# Patient Record
Sex: MALE | Race: WHITE | Employment: FULL TIME | URBAN - METROPOLITAN AREA
[De-identification: names, ages, dates, MRNs, and addresses within clinical notes are randomized per-mention and may not be internally consistent; named-entity substitution may affect disease eponyms.]

---

## 2017-02-08 ENCOUNTER — OFFICE VISIT (OUTPATIENT)
Dept: FAMILY MEDICINE CLINIC | Age: 52
End: 2017-02-08

## 2017-02-08 VITALS
DIASTOLIC BLOOD PRESSURE: 84 MMHG | HEART RATE: 86 BPM | SYSTOLIC BLOOD PRESSURE: 128 MMHG | BODY MASS INDEX: 37.87 KG/M2 | RESPIRATION RATE: 18 BRPM | WEIGHT: 287 LBS | OXYGEN SATURATION: 97 % | TEMPERATURE: 98.4 F

## 2017-02-08 DIAGNOSIS — R11.2 NAUSEA AND VOMITING, INTRACTABILITY OF VOMITING NOT SPECIFIED, UNSPECIFIED VOMITING TYPE: ICD-10-CM

## 2017-02-08 DIAGNOSIS — B34.9 VIRAL ILLNESS: Primary | ICD-10-CM

## 2017-02-08 DIAGNOSIS — J06.9 UPPER RESPIRATORY TRACT INFECTION, UNSPECIFIED TYPE: ICD-10-CM

## 2017-02-08 DIAGNOSIS — R68.89 FLU-LIKE SYMPTOMS: ICD-10-CM

## 2017-02-08 DIAGNOSIS — R19.7 DIARRHEA, UNSPECIFIED TYPE: ICD-10-CM

## 2017-02-08 LAB
INFLUENZA A ANTIBODY: NEGATIVE
INFLUENZA B ANTIBODY: NEGATIVE

## 2017-02-08 PROCEDURE — 87804 INFLUENZA ASSAY W/OPTIC: CPT | Performed by: NURSE PRACTITIONER

## 2017-02-08 PROCEDURE — 99213 OFFICE O/P EST LOW 20 MIN: CPT | Performed by: NURSE PRACTITIONER

## 2017-02-08 ASSESSMENT — ENCOUNTER SYMPTOMS
SWOLLEN GLANDS: 0
VISUAL CHANGE: 0
SHORTNESS OF BREATH: 0
ABDOMINAL PAIN: 0
COUGH: 1
VOMITING: 1
BLOATING: 0
WHEEZING: 0
HEMOPTYSIS: 0
NAUSEA: 1
FLATUS: 0
SPUTUM PRODUCTION: 0
SORE THROAT: 0
BACK PAIN: 1
DIARRHEA: 1

## 2017-05-17 ENCOUNTER — TELEPHONE (OUTPATIENT)
Dept: FAMILY MEDICINE CLINIC | Age: 52
End: 2017-05-17

## 2017-05-17 ENCOUNTER — OFFICE VISIT (OUTPATIENT)
Dept: FAMILY MEDICINE CLINIC | Age: 52
End: 2017-05-17

## 2017-05-17 VITALS
BODY MASS INDEX: 36.55 KG/M2 | SYSTOLIC BLOOD PRESSURE: 128 MMHG | HEART RATE: 78 BPM | TEMPERATURE: 97.9 F | OXYGEN SATURATION: 97 % | DIASTOLIC BLOOD PRESSURE: 84 MMHG | WEIGHT: 277 LBS

## 2017-05-17 DIAGNOSIS — H10.31 ACUTE CONJUNCTIVITIS OF RIGHT EYE, UNSPECIFIED ACUTE CONJUNCTIVITIS TYPE: Primary | ICD-10-CM

## 2017-05-17 DIAGNOSIS — J30.2 SEASONAL ALLERGIC RHINITIS, UNSPECIFIED ALLERGIC RHINITIS TRIGGER: ICD-10-CM

## 2017-05-17 PROCEDURE — 99212 OFFICE O/P EST SF 10 MIN: CPT | Performed by: NURSE PRACTITIONER

## 2017-05-17 RX ORDER — FLUTICASONE PROPIONATE 50 MCG
2 SPRAY, SUSPENSION (ML) NASAL DAILY
Qty: 1 BOTTLE | Refills: 3 | Status: SHIPPED | OUTPATIENT
Start: 2017-05-17 | End: 2017-09-07 | Stop reason: SDUPTHER

## 2017-05-17 RX ORDER — CIPROFLOXACIN HYDROCHLORIDE 3.5 MG/ML
SOLUTION/ DROPS TOPICAL
Qty: 120 DROP | Refills: 0 | Status: SHIPPED | OUTPATIENT
Start: 2017-05-17 | End: 2017-05-17 | Stop reason: CLARIF

## 2017-05-17 RX ORDER — POLYMYXIN B SULFATE AND TRIMETHOPRIM 1; 10000 MG/ML; [USP'U]/ML
1 SOLUTION OPHTHALMIC EVERY 6 HOURS
Qty: 10 ML | Refills: 0
Start: 2017-05-17 | End: 2017-05-27

## 2017-05-17 RX ORDER — FLUTICASONE PROPIONATE 50 MCG
2 SPRAY, SUSPENSION (ML) NASAL DAILY
Qty: 1 BOTTLE | Refills: 3 | Status: SHIPPED | OUTPATIENT
Start: 2017-05-17 | End: 2017-05-17 | Stop reason: SDUPTHER

## 2017-05-17 ASSESSMENT — ENCOUNTER SYMPTOMS
STRIDOR: 0
SORE THROAT: 0
RESPIRATORY NEGATIVE: 1

## 2018-04-26 ENCOUNTER — OFFICE VISIT (OUTPATIENT)
Dept: FAMILY MEDICINE CLINIC | Age: 53
End: 2018-04-26

## 2018-04-26 VITALS
TEMPERATURE: 98.5 F | DIASTOLIC BLOOD PRESSURE: 76 MMHG | WEIGHT: 279 LBS | SYSTOLIC BLOOD PRESSURE: 122 MMHG | BODY MASS INDEX: 36.98 KG/M2 | RESPIRATION RATE: 16 BRPM | HEART RATE: 86 BPM | OXYGEN SATURATION: 97 % | HEIGHT: 73 IN

## 2018-04-26 DIAGNOSIS — R52 BODY ACHES: ICD-10-CM

## 2018-04-26 DIAGNOSIS — R68.83 CHILLS: ICD-10-CM

## 2018-04-26 DIAGNOSIS — R68.89 FLU-LIKE SYMPTOMS: ICD-10-CM

## 2018-04-26 LAB
INFLUENZA A ANTIBODY: NORMAL
INFLUENZA B ANTIBODY: NORMAL

## 2018-04-26 PROCEDURE — 87804 INFLUENZA ASSAY W/OPTIC: CPT | Performed by: NURSE PRACTITIONER

## 2018-04-26 PROCEDURE — 99212 OFFICE O/P EST SF 10 MIN: CPT | Performed by: NURSE PRACTITIONER

## 2018-04-26 RX ORDER — IBUPROFEN 200 MG
200 TABLET ORAL EVERY 6 HOURS PRN
COMMUNITY
End: 2021-11-23

## 2018-04-26 ASSESSMENT — ENCOUNTER SYMPTOMS
VOMITING: 0
SHORTNESS OF BREATH: 0
SPUTUM PRODUCTION: 0
SORE THROAT: 0
ABDOMINAL PAIN: 0
NAUSEA: 0
COUGH: 1
DIARRHEA: 0

## 2018-04-26 ASSESSMENT — PATIENT HEALTH QUESTIONNAIRE - PHQ9
1. LITTLE INTEREST OR PLEASURE IN DOING THINGS: 0
2. FEELING DOWN, DEPRESSED OR HOPELESS: 0
SUM OF ALL RESPONSES TO PHQ9 QUESTIONS 1 & 2: 0
SUM OF ALL RESPONSES TO PHQ QUESTIONS 1-9: 0

## 2018-05-03 DIAGNOSIS — H10.31 ACUTE CONJUNCTIVITIS OF RIGHT EYE, UNSPECIFIED ACUTE CONJUNCTIVITIS TYPE: ICD-10-CM

## 2018-05-03 RX ORDER — FLUTICASONE PROPIONATE 50 MCG
SPRAY, SUSPENSION (ML) NASAL
Qty: 16 G | Refills: 0 | Status: SHIPPED | OUTPATIENT
Start: 2018-05-03 | End: 2018-05-18 | Stop reason: SDUPTHER

## 2018-05-18 ENCOUNTER — OFFICE VISIT (OUTPATIENT)
Dept: FAMILY MEDICINE CLINIC | Age: 53
End: 2018-05-18

## 2018-05-18 VITALS
HEART RATE: 86 BPM | BODY MASS INDEX: 36.71 KG/M2 | DIASTOLIC BLOOD PRESSURE: 82 MMHG | SYSTOLIC BLOOD PRESSURE: 138 MMHG | WEIGHT: 277 LBS | OXYGEN SATURATION: 97 % | HEIGHT: 73 IN

## 2018-05-18 DIAGNOSIS — H10.31 ACUTE CONJUNCTIVITIS OF RIGHT EYE, UNSPECIFIED ACUTE CONJUNCTIVITIS TYPE: ICD-10-CM

## 2018-05-18 DIAGNOSIS — Z12.5 SPECIAL SCREENING FOR MALIGNANT NEOPLASM OF PROSTATE: ICD-10-CM

## 2018-05-18 DIAGNOSIS — Z12.11 COLON CANCER SCREENING: ICD-10-CM

## 2018-05-18 DIAGNOSIS — Z00.00 ANNUAL PHYSICAL EXAM: Primary | ICD-10-CM

## 2018-05-18 DIAGNOSIS — J30.2 CHRONIC SEASONAL ALLERGIC RHINITIS, UNSPECIFIED TRIGGER: ICD-10-CM

## 2018-05-18 LAB
BILIRUBIN, POC: NORMAL
BLOOD URINE, POC: NORMAL
CLARITY, POC: CLEAR
COLOR, POC: YELLOW
GLUCOSE URINE, POC: NORMAL
KETONES, POC: NORMAL
LEUKOCYTE EST, POC: NORMAL
NITRITE, POC: NORMAL
PH, POC: 5.5
PROTEIN, POC: NORMAL
SPECIFIC GRAVITY, POC: >=1.03
UROBILINOGEN, POC: 0.2

## 2018-05-18 PROCEDURE — 81002 URINALYSIS NONAUTO W/O SCOPE: CPT | Performed by: FAMILY MEDICINE

## 2018-05-18 PROCEDURE — 99396 PREV VISIT EST AGE 40-64: CPT | Performed by: FAMILY MEDICINE

## 2018-05-18 RX ORDER — FLUTICASONE PROPIONATE 50 MCG
SPRAY, SUSPENSION (ML) NASAL
Qty: 1 BOTTLE | Refills: 3 | Status: SHIPPED | OUTPATIENT
Start: 2018-05-18 | End: 2021-11-23

## 2018-05-18 ASSESSMENT — ENCOUNTER SYMPTOMS
RESPIRATORY NEGATIVE: 1
GASTROINTESTINAL NEGATIVE: 1

## 2018-05-22 DIAGNOSIS — Z12.11 SCREEN FOR COLON CANCER: Primary | ICD-10-CM

## 2018-05-22 RX ORDER — SODIUM, POTASSIUM,MAG SULFATES 17.5-3.13G
SOLUTION, RECONSTITUTED, ORAL ORAL
Qty: 354 ML | Refills: 0 | Status: SHIPPED | OUTPATIENT
Start: 2018-05-22 | End: 2018-06-08 | Stop reason: ALTCHOICE

## 2018-05-26 ENCOUNTER — HOSPITAL ENCOUNTER (OUTPATIENT)
Dept: OTHER | Age: 53
Discharge: HOME | End: 2018-05-26
Attending: FAMILY MEDICINE | Admitting: FAMILY MEDICINE

## 2018-05-26 DIAGNOSIS — Z12.5 SPECIAL SCREENING FOR MALIGNANT NEOPLASM OF PROSTATE: ICD-10-CM

## 2018-05-26 DIAGNOSIS — Z00.00 ANNUAL PHYSICAL EXAM: ICD-10-CM

## 2018-05-26 LAB
A/G RATIO: 1.6 (ref 1.1–2.2)
ALBUMIN SERPL-MCNC: 4.4 G/DL (ref 3.4–5)
ALP BLD-CCNC: 58 U/L (ref 40–129)
ALT SERPL-CCNC: 32 U/L (ref 10–40)
ANION GAP SERPL CALCULATED.3IONS-SCNC: 13 MMOL/L (ref 3–16)
AST SERPL-CCNC: 27 U/L (ref 15–37)
BASOPHILS ABSOLUTE: 0.1 K/UL (ref 0–0.2)
BASOPHILS RELATIVE PERCENT: 1.2 %
BILIRUB SERPL-MCNC: 0.4 MG/DL (ref 0–1)
BUN BLDV-MCNC: 20 MG/DL (ref 7–20)
CALCIUM SERPL-MCNC: 9.4 MG/DL (ref 8.3–10.6)
CHLORIDE BLD-SCNC: 107 MMOL/L (ref 99–110)
CHOLESTEROL, TOTAL: 180 MG/DL (ref 0–199)
CO2: 23 MMOL/L (ref 21–32)
CREAT SERPL-MCNC: 0.7 MG/DL (ref 0.9–1.3)
EOSINOPHILS ABSOLUTE: 0.1 K/UL (ref 0–0.6)
EOSINOPHILS RELATIVE PERCENT: 2.2 %
GFR AFRICAN AMERICAN: >60
GFR NON-AFRICAN AMERICAN: >60
GLOBULIN: 2.7 G/DL
GLUCOSE BLD-MCNC: 104 MG/DL (ref 70–99)
HCT VFR BLD CALC: 39.2 % (ref 40.5–52.5)
HDLC SERPL-MCNC: 49 MG/DL (ref 40–60)
HEMOGLOBIN: 13.6 G/DL (ref 13.5–17.5)
LDL CHOLESTEROL CALCULATED: 112 MG/DL
LYMPHOCYTES ABSOLUTE: 1.8 K/UL (ref 1–5.1)
LYMPHOCYTES RELATIVE PERCENT: 36.7 %
MCH RBC QN AUTO: 31.3 PG (ref 26–34)
MCHC RBC AUTO-ENTMCNC: 34.6 G/DL (ref 31–36)
MCV RBC AUTO: 90.5 FL (ref 80–100)
MONOCYTES ABSOLUTE: 0.4 K/UL (ref 0–1.3)
MONOCYTES RELATIVE PERCENT: 8.5 %
NEUTROPHILS ABSOLUTE: 2.5 K/UL (ref 1.7–7.7)
NEUTROPHILS RELATIVE PERCENT: 51.4 %
PDW BLD-RTO: 13.4 % (ref 12.4–15.4)
PLATELET # BLD: 260 K/UL (ref 135–450)
PMV BLD AUTO: 8.2 FL (ref 5–10.5)
POTASSIUM SERPL-SCNC: 4.1 MMOL/L (ref 3.5–5.1)
PROSTATE SPECIFIC ANTIGEN: 1.52 NG/ML (ref 0–4)
RBC # BLD: 4.34 M/UL (ref 4.2–5.9)
SODIUM BLD-SCNC: 143 MMOL/L (ref 136–145)
TOTAL PROTEIN: 7.1 G/DL (ref 6.4–8.2)
TRIGL SERPL-MCNC: 97 MG/DL (ref 0–150)
VLDLC SERPL CALC-MCNC: 19 MG/DL
WBC # BLD: 4.9 K/UL (ref 4–11)

## 2018-06-07 ENCOUNTER — TELEPHONE (OUTPATIENT)
Dept: GASTROENTEROLOGY | Age: 53
End: 2018-06-07

## 2018-06-08 ENCOUNTER — HOSPITAL ENCOUNTER (OUTPATIENT)
Dept: OTHER | Age: 53
Discharge: HOME | End: 2018-06-08
Attending: INTERNAL MEDICINE | Admitting: INTERNAL MEDICINE

## 2018-06-08 VITALS
SYSTOLIC BLOOD PRESSURE: 120 MMHG | DIASTOLIC BLOOD PRESSURE: 70 MMHG | BODY MASS INDEX: 36.45 KG/M2 | OXYGEN SATURATION: 99 % | TEMPERATURE: 97 F | WEIGHT: 275 LBS | HEART RATE: 62 BPM | RESPIRATION RATE: 16 BRPM | HEIGHT: 73 IN

## 2018-06-08 DIAGNOSIS — K57.30 DIVERTICULOSIS OF LARGE INTESTINE WITHOUT HEMORRHAGE: ICD-10-CM

## 2018-06-08 DIAGNOSIS — Z12.11 SCREEN FOR COLON CANCER: ICD-10-CM

## 2018-06-08 PROCEDURE — 45378 DIAGNOSTIC COLONOSCOPY: CPT | Performed by: INTERNAL MEDICINE

## 2018-06-08 PROCEDURE — 99152 MOD SED SAME PHYS/QHP 5/>YRS: CPT | Performed by: INTERNAL MEDICINE

## 2018-06-08 RX ORDER — SODIUM CHLORIDE 9 MG/ML
1000 INJECTION, SOLUTION INTRAVENOUS CONTINUOUS
Status: DISCONTINUED | OUTPATIENT
Start: 2018-06-08 | End: 2018-06-09 | Stop reason: HOSPADM

## 2018-06-08 RX ADMIN — SODIUM CHLORIDE 1000 ML: 9 INJECTION, SOLUTION INTRAVENOUS at 08:40

## 2018-06-08 ASSESSMENT — PAIN - FUNCTIONAL ASSESSMENT: PAIN_FUNCTIONAL_ASSESSMENT: 0-10

## 2018-06-08 ASSESSMENT — PAIN SCALES - GENERAL
PAINLEVEL_OUTOF10: 0

## 2018-06-11 ENCOUNTER — TELEPHONE (OUTPATIENT)
Dept: GASTROENTEROLOGY | Age: 53
End: 2018-06-11

## 2018-06-25 ENCOUNTER — OFFICE VISIT (OUTPATIENT)
Dept: FAMILY MEDICINE CLINIC | Age: 53
End: 2018-06-25

## 2018-06-25 VITALS
HEART RATE: 87 BPM | SYSTOLIC BLOOD PRESSURE: 142 MMHG | OXYGEN SATURATION: 98 % | RESPIRATION RATE: 17 BRPM | HEIGHT: 73 IN | BODY MASS INDEX: 36.5 KG/M2 | DIASTOLIC BLOOD PRESSURE: 78 MMHG | WEIGHT: 275.4 LBS

## 2018-06-25 DIAGNOSIS — M54.42 ACUTE LEFT-SIDED LOW BACK PAIN WITH LEFT-SIDED SCIATICA: ICD-10-CM

## 2018-06-25 PROBLEM — H10.31 ACUTE CONJUNCTIVITIS OF RIGHT EYE: Status: RESOLVED | Noted: 2017-05-17 | Resolved: 2018-06-25

## 2018-06-25 PROBLEM — Z12.11 SCREEN FOR COLON CANCER: Status: RESOLVED | Noted: 2018-06-08 | Resolved: 2018-06-25

## 2018-06-25 PROCEDURE — 99214 OFFICE O/P EST MOD 30 MIN: CPT | Performed by: NURSE PRACTITIONER

## 2018-06-25 RX ORDER — METHYLPREDNISOLONE 4 MG/1
TABLET ORAL
Qty: 1 KIT | Refills: 0 | Status: SHIPPED | OUTPATIENT
Start: 2018-06-25 | End: 2018-07-01

## 2018-06-25 RX ORDER — METHOCARBAMOL 500 MG/1
500 TABLET, FILM COATED ORAL 3 TIMES DAILY
Qty: 30 TABLET | Refills: 0 | Status: SHIPPED | OUTPATIENT
Start: 2018-06-25 | End: 2018-07-02 | Stop reason: ALTCHOICE

## 2018-06-25 ASSESSMENT — ENCOUNTER SYMPTOMS
COUGH: 0
CHEST TIGHTNESS: 0
VOMITING: 0
BACK PAIN: 1
NAUSEA: 0

## 2018-06-25 ASSESSMENT — PATIENT HEALTH QUESTIONNAIRE - PHQ9
SUM OF ALL RESPONSES TO PHQ QUESTIONS 1-9: 0
1. LITTLE INTEREST OR PLEASURE IN DOING THINGS: 0
2. FEELING DOWN, DEPRESSED OR HOPELESS: 0
SUM OF ALL RESPONSES TO PHQ9 QUESTIONS 1 & 2: 0

## 2018-06-26 ENCOUNTER — TELEPHONE (OUTPATIENT)
Dept: FAMILY MEDICINE CLINIC | Age: 53
End: 2018-06-26

## 2018-06-26 DIAGNOSIS — R52 ACUTE PAIN: Primary | ICD-10-CM

## 2018-06-26 RX ORDER — HYDROCODONE BITARTRATE AND ACETAMINOPHEN 5; 325 MG/1; MG/1
TABLET ORAL
Qty: 30 TABLET | Refills: 0 | Status: SHIPPED | OUTPATIENT
Start: 2018-06-26 | End: 2018-07-06 | Stop reason: SDUPTHER

## 2018-07-02 ENCOUNTER — OFFICE VISIT (OUTPATIENT)
Dept: FAMILY MEDICINE CLINIC | Age: 53
End: 2018-07-02

## 2018-07-02 VITALS
HEIGHT: 73 IN | WEIGHT: 280 LBS | SYSTOLIC BLOOD PRESSURE: 144 MMHG | DIASTOLIC BLOOD PRESSURE: 86 MMHG | OXYGEN SATURATION: 98 % | BODY MASS INDEX: 37.11 KG/M2 | HEART RATE: 82 BPM

## 2018-07-02 DIAGNOSIS — M54.42 ACUTE LEFT-SIDED LOW BACK PAIN WITH LEFT-SIDED SCIATICA: Primary | ICD-10-CM

## 2018-07-02 PROCEDURE — 99213 OFFICE O/P EST LOW 20 MIN: CPT | Performed by: FAMILY MEDICINE

## 2018-07-02 ASSESSMENT — ENCOUNTER SYMPTOMS
COUGH: 0
SHORTNESS OF BREATH: 0

## 2018-07-02 NOTE — PATIENT INSTRUCTIONS
Nam was seen today for back pain.     Diagnoses and all orders for this visit:    Acute left-sided low back pain with left-sided sciatica  Comments:  much improved  Monitor calf pain & lt leg weakness

## 2018-07-06 DIAGNOSIS — R52 ACUTE PAIN: ICD-10-CM

## 2018-07-06 DIAGNOSIS — M54.42 ACUTE LEFT-SIDED LOW BACK PAIN WITH LEFT-SIDED SCIATICA: ICD-10-CM

## 2018-07-06 RX ORDER — HYDROCODONE BITARTRATE AND ACETAMINOPHEN 5; 325 MG/1; MG/1
TABLET ORAL
Qty: 30 TABLET | Refills: 0 | Status: SHIPPED | OUTPATIENT
Start: 2018-07-06 | End: 2018-07-11

## 2018-07-06 RX ORDER — METHOCARBAMOL 500 MG/1
500 TABLET, FILM COATED ORAL 3 TIMES DAILY
Qty: 90 TABLET | Refills: 0 | Status: SHIPPED | OUTPATIENT
Start: 2018-07-06 | End: 2018-07-16

## 2019-04-24 ENCOUNTER — OFFICE VISIT (OUTPATIENT)
Dept: FAMILY MEDICINE CLINIC | Age: 54
End: 2019-04-24
Payer: COMMERCIAL

## 2019-04-24 VITALS
HEART RATE: 95 BPM | HEIGHT: 73 IN | OXYGEN SATURATION: 94 % | SYSTOLIC BLOOD PRESSURE: 112 MMHG | BODY MASS INDEX: 35.76 KG/M2 | WEIGHT: 269.8 LBS | DIASTOLIC BLOOD PRESSURE: 82 MMHG

## 2019-04-24 DIAGNOSIS — M25.561 RIGHT MEDIAL KNEE PAIN: Primary | ICD-10-CM

## 2019-04-24 PROCEDURE — 99213 OFFICE O/P EST LOW 20 MIN: CPT | Performed by: FAMILY MEDICINE

## 2019-04-24 ASSESSMENT — PATIENT HEALTH QUESTIONNAIRE - PHQ9
SUM OF ALL RESPONSES TO PHQ QUESTIONS 1-9: 0
SUM OF ALL RESPONSES TO PHQ QUESTIONS 1-9: 0
2. FEELING DOWN, DEPRESSED OR HOPELESS: 0
1. LITTLE INTEREST OR PLEASURE IN DOING THINGS: 0
SUM OF ALL RESPONSES TO PHQ9 QUESTIONS 1 & 2: 0

## 2019-04-29 DIAGNOSIS — M25.569 CHRONIC KNEE PAIN, UNSPECIFIED LATERALITY: Primary | ICD-10-CM

## 2019-04-29 DIAGNOSIS — G89.29 CHRONIC KNEE PAIN, UNSPECIFIED LATERALITY: Primary | ICD-10-CM

## 2019-05-01 ENCOUNTER — TELEPHONE (OUTPATIENT)
Dept: FAMILY MEDICINE CLINIC | Age: 54
End: 2019-05-01

## 2019-05-14 ENCOUNTER — OFFICE VISIT (OUTPATIENT)
Dept: ORTHOPEDIC SURGERY | Age: 54
End: 2019-05-14
Payer: COMMERCIAL

## 2019-05-14 VITALS — BODY MASS INDEX: 35.76 KG/M2 | WEIGHT: 269.84 LBS | HEIGHT: 73 IN

## 2019-05-14 DIAGNOSIS — M25.561 RIGHT KNEE PAIN, UNSPECIFIED CHRONICITY: ICD-10-CM

## 2019-05-14 DIAGNOSIS — M23.203 DEGENERATIVE TEAR OF MEDIAL MENISCUS OF RIGHT KNEE: Primary | ICD-10-CM

## 2019-05-14 DIAGNOSIS — S83.241A TEAR OF MEDIAL MENISCUS OF RIGHT KNEE, CURRENT, UNSPECIFIED TEAR TYPE, INITIAL ENCOUNTER: ICD-10-CM

## 2019-05-14 PROCEDURE — 20610 DRAIN/INJ JOINT/BURSA W/O US: CPT | Performed by: ORTHOPAEDIC SURGERY

## 2019-05-14 PROCEDURE — 99243 OFF/OP CNSLTJ NEW/EST LOW 30: CPT | Performed by: ORTHOPAEDIC SURGERY

## 2019-05-14 RX ORDER — MELOXICAM 15 MG/1
15 TABLET ORAL DAILY
Qty: 30 TABLET | Refills: 2 | Status: SHIPPED | OUTPATIENT
Start: 2019-05-14 | End: 2019-08-07 | Stop reason: SDUPTHER

## 2019-06-10 DIAGNOSIS — S83.241A TEAR OF MEDIAL MENISCUS OF RIGHT KNEE, CURRENT, UNSPECIFIED TEAR TYPE, INITIAL ENCOUNTER: ICD-10-CM

## 2019-06-10 DIAGNOSIS — M25.561 RIGHT KNEE PAIN, UNSPECIFIED CHRONICITY: Primary | ICD-10-CM

## 2019-06-13 ENCOUNTER — TELEPHONE (OUTPATIENT)
Dept: ORTHOPEDIC SURGERY | Age: 54
End: 2019-06-13

## 2019-06-25 ENCOUNTER — OFFICE VISIT (OUTPATIENT)
Dept: ORTHOPEDIC SURGERY | Age: 54
End: 2019-06-25
Payer: COMMERCIAL

## 2019-06-25 VITALS — BODY MASS INDEX: 35.76 KG/M2 | WEIGHT: 269.84 LBS | HEIGHT: 73 IN

## 2019-06-25 DIAGNOSIS — M84.469G INSUFFICIENCY FRACTURE OF TIBIA WITH DELAYED HEALING, SUBSEQUENT ENCOUNTER: ICD-10-CM

## 2019-06-25 DIAGNOSIS — M23.203 DEGENERATIVE TEAR OF MEDIAL MENISCUS OF RIGHT KNEE: Primary | ICD-10-CM

## 2019-06-25 DIAGNOSIS — M84.453A INSUFFICIENCY FRACTURE OF MEDIAL FEMORAL CONDYLE (HCC): ICD-10-CM

## 2019-06-25 PROBLEM — M84.469A INSUFFICIENCY FRACTURE OF TIBIA: Status: ACTIVE | Noted: 2019-06-25

## 2019-06-25 PROCEDURE — 99213 OFFICE O/P EST LOW 20 MIN: CPT | Performed by: ORTHOPAEDIC SURGERY

## 2019-06-25 NOTE — PROGRESS NOTES
Types: 1 Cans of beer per week    Drug use: No    Sexual activity: Not on file   Lifestyle    Physical activity:     Days per week: Not on file     Minutes per session: Not on file    Stress: Not on file   Relationships    Social connections:     Talks on phone: Not on file     Gets together: Not on file     Attends Yazdanism service: Not on file     Active member of club or organization: Not on file     Attends meetings of clubs or organizations: Not on file     Relationship status: Not on file    Intimate partner violence:     Fear of current or ex partner: Not on file     Emotionally abused: Not on file     Physically abused: Not on file     Forced sexual activity: Not on file   Other Topics Concern    Not on file   Social History Narrative    Not on file       Family HISTORY    Family History   Problem Relation Age of Onset    Diabetes Father     Heart Disease Father        PHYSICAL EXAM    Vital Signs:  Ht 6' 0.99\" (1.854 m)   Wt 269 lb 13.5 oz (122.4 kg)   BMI 35.61 kg/m²   General Appearance:  Normal body habitus. Alert and oriented to person, place, and time. Affect:  Normal.   Gait:  Normal. Good balance and coordination. Skin:  Intact. Sensation:  Intact. Strength:  Intact. Reflexes:  Intact. Pulses:  Intact. Right Knee Exam:    Effusion:  Negative    Range of Motion Right Left   Extension 0 0   Flexion 115 115     Provocative Test Right Left    Positive Negative Positive Negative   Anterior drawer [] [x] [] [x]   Lachman [] [x] [] [x]   Posterior drawer [] [x] [] [x]   Varus testing [] [x] [] [x]   Valgus testing [x] [] [] [x]   Joint line tenderness [x] [] [] [x]     Additional Exam Comments:  His neurocirculatory lymphatic exam otherwise is normal symmetric to both lower extremities. He has medial joint line tenderness to direct palpation and Chaitanya's maneuver. He has no gross instability.       IMAGING STUDIES    I reviewed the MRI of his right knee which reveals a degenerative tear of the medial meniscus as well as insufficiency fracture/stress reaction of the medial femoral condyle and medial tibial plateau with greater than 50% joint space remaining    IMPRESSION    Right knee pain secondary to degenerative medial meniscus tear, and insufficiency fracture of the medial femoral condyle and medial tibial plateau    PLAN      1. Conservative care options including physical therapy, NSAIDs, bracing, and activity modification were discussed. 2.  The indications for therapeutic injections were discussed. 3.  The indications for additional imaging studies were discussed. 4.  After considering the various options discussed, the patient elected to pursue a course that includes proceed with arthroscopic intervention to the right knee for partial medial meniscectomy and at the same setting perform internal fixation of the stress reaction/insufficiency fractures of the medial femoral condyle and medial tibial plateau with bone substitute under fluoroscopic guidance. After an evaluation of this patient and a review of his radiographic testing, it would be my opinion that the symptoms, for which I am treating him are mechanical in origin. Although he has concomitant osteoarthritic changes, his joint space has greater than 50% of the articular surfaces left within the knee compartment. Additionally he has failed to improve over time with a physician directed physical therapy program, consideration of bracing, medications, and injections. It is with those observations both objective and subjective that I would recommend proceeding with arthroscopic intervention to her need to address the meniscal pathology. INFORMED CONSENT NOTE        We discussed the risks, benefits, and alternatives to the proposed procedure, as well as the necessity of other members of the healthcare team participating in the procedure.  All questions were answered and the patient elected to proceed with the proposed procedure and signed the informed consent form.

## 2019-06-27 DIAGNOSIS — M25.561 RIGHT KNEE PAIN, UNSPECIFIED CHRONICITY: ICD-10-CM

## 2019-06-27 DIAGNOSIS — S83.241A TEAR OF MEDIAL MENISCUS OF RIGHT KNEE, CURRENT, UNSPECIFIED TEAR TYPE, INITIAL ENCOUNTER: ICD-10-CM

## 2019-08-02 ENCOUNTER — TELEPHONE (OUTPATIENT)
Dept: ORTHOPEDIC SURGERY | Age: 54
End: 2019-08-02

## 2019-08-02 NOTE — TELEPHONE ENCOUNTER
Auth: # [id number]    Date: 8/9/19 thru 9/9/19  Type of SX: Outpatient  Location: [facility]  CPT: 91517, [id number]   SX area: Rt knee      CPT: 70840, 74183    NPR

## 2019-08-07 ENCOUNTER — TELEPHONE (OUTPATIENT)
Dept: FAMILY MEDICINE CLINIC | Age: 54
End: 2019-08-07

## 2019-08-07 DIAGNOSIS — M25.561 RIGHT KNEE PAIN, UNSPECIFIED CHRONICITY: ICD-10-CM

## 2019-08-07 DIAGNOSIS — S83.241A TEAR OF MEDIAL MENISCUS OF RIGHT KNEE, CURRENT, UNSPECIFIED TEAR TYPE, INITIAL ENCOUNTER: ICD-10-CM

## 2019-08-07 RX ORDER — MELOXICAM 15 MG/1
15 TABLET ORAL DAILY
Qty: 30 TABLET | Refills: 0 | Status: SHIPPED | OUTPATIENT
Start: 2019-08-07 | End: 2021-11-23

## 2019-08-13 ENCOUNTER — TELEPHONE (OUTPATIENT)
Dept: ORTHOPEDIC SURGERY | Age: 54
End: 2019-08-13

## 2019-08-19 ENCOUNTER — OFFICE VISIT (OUTPATIENT)
Dept: FAMILY MEDICINE CLINIC | Age: 54
End: 2019-08-19
Payer: COMMERCIAL

## 2019-08-19 VITALS
SYSTOLIC BLOOD PRESSURE: 148 MMHG | WEIGHT: 277 LBS | HEART RATE: 91 BPM | DIASTOLIC BLOOD PRESSURE: 84 MMHG | OXYGEN SATURATION: 97 % | BODY MASS INDEX: 37.52 KG/M2 | HEIGHT: 72 IN

## 2019-08-19 DIAGNOSIS — Z01.818 PRE-OP EXAMINATION: ICD-10-CM

## 2019-08-19 DIAGNOSIS — S83.241A TEAR OF MEDIAL MENISCUS OF RIGHT KNEE, CURRENT, UNSPECIFIED TEAR TYPE, INITIAL ENCOUNTER: Primary | ICD-10-CM

## 2019-08-19 PROCEDURE — 99241 PR OFFICE CONSULTATION NEW/ESTAB PATIENT 15 MIN: CPT | Performed by: FAMILY MEDICINE

## 2019-08-19 PROCEDURE — 93000 ELECTROCARDIOGRAM COMPLETE: CPT | Performed by: FAMILY MEDICINE

## 2019-08-19 NOTE — PROGRESS NOTES
Preoperative Consultation      Nam Pulliam  YOB: 1965    Date of Service:  8/19/2019    Vitals:    08/19/19 1519 08/19/19 1523   BP: (!) 150/70 (!) 148/84   Site: Left Upper Arm Left Upper Arm   Position: Sitting Sitting   Cuff Size: Large Adult Large Adult   Pulse: 91    SpO2: 97%    Weight: 277 lb (125.6 kg)    Height: 6' (1.829 m)       Wt Readings from Last 2 Encounters:   08/19/19 277 lb (125.6 kg)   06/25/19 269 lb 13.5 oz (122.4 kg)     BP Readings from Last 3 Encounters:   08/19/19 (!) 148/84   04/24/19 112/82   07/02/18 (!) 144/86        Chief Complaint   Patient presents with   Yu Alexandre Pre-op Exam     right knee surgery, Dr. Carmen Witt, 8/23/19     Allergies   Allergen Reactions    Seasonal      Outpatient Medications Marked as Taking for the 8/19/19 encounter (Office Visit) with Tripp Ulloa,    Medication Sig Dispense Refill    meloxicam (MOBIC) 15 MG tablet TAKE 1 TABLET BY MOUTH DAILY 30 tablet 0    diclofenac sodium 1 % GEL APPLY 4 GRAMS EXTERNALLY TO THE AFFECTED AREA FOUR TIMES DAILY 200 g 0    fluticasone (FLONASE) 50 MCG/ACT nasal spray 2 SPRAYS IN EACH NOSTRIL DAILY 1 Bottle 3    ibuprofen (ADVIL;MOTRIN) 200 MG tablet Take 200 mg by mouth every 6 hours as needed for Pain         This patient presents to the office today for a preoperative consultation at the request of surgeon, Dr. Carmen Witt, who plans on performing Med Men Tear Rt Knee on August 23 at [facility]. The current problem began 1 year ago, and symptoms have been worsening with time. Conservative therapy: N/A.     Planned anesthesia: General   Known anesthesia problems: None   Bleeding risk: No recent or remote history of abnormal bleeding  Personal or FH of DVT/PE: No    Patient objection to receiving blood products: No    Patient Active Problem List   Diagnosis    Seasonal allergic rhinitis    Diverticulosis of large intestine without hemorrhage    Acute left-sided low back pain with left-sided sciatica

## 2019-08-22 ENCOUNTER — ANESTHESIA EVENT (OUTPATIENT)
Dept: OPERATING ROOM | Age: 54
End: 2019-08-22
Payer: COMMERCIAL

## 2019-08-22 ASSESSMENT — LIFESTYLE VARIABLES: SMOKING_STATUS: 0

## 2019-08-22 NOTE — ANESTHESIA PRE PROCEDURE
K57.30    Acute left-sided low back pain with left-sided sciatica M54.42    Degenerative tear of medial meniscus of right knee M23.203    Tear of medial meniscus of right knee, current S83.241A    Right knee pain M25.561    Insufficiency fracture of medial femoral condyle (HCC) M84.453A    Insufficiency fracture of tibia M84.469A       Past Medical History:        Diagnosis Date    Acute left-sided low back pain with left-sided sciatica 6/25/2018    Allergic rhinitis     Degenerative tear of medial meniscus of right knee 5/14/2019    No history of previous surgery 06/08/2018    colonoscopy       Past Surgical History:        Procedure Laterality Date    COLONOSCOPY  06/08/2018    diverticulosis    TONSILLECTOMY         Social History:    Social History     Tobacco Use    Smoking status: Never Smoker    Smokeless tobacco: Never Used   Substance Use Topics    Alcohol use: Yes     Alcohol/week: 1.0 standard drinks     Types: 1 Cans of beer per week                                Counseling given: Not Answered      Vital Signs (Current):   Vitals:    08/12/19 1103 08/23/19 0612   BP:  119/68   Pulse:  64   Resp:  16   Temp:  97.6 °F (36.4 °C)   TempSrc:  Temporal   SpO2:  97%   Weight: 265 lb (120.2 kg)    Height: 6' 1\" (1.854 m)                                               BP Readings from Last 3 Encounters:   08/23/19 119/68   08/19/19 (!) 148/84   04/24/19 112/82       NPO Status:  mn+, no am meds                                                                               BMI:   Wt Readings from Last 3 Encounters:   08/12/19 265 lb (120.2 kg)   08/19/19 277 lb (125.6 kg)   06/25/19 269 lb 13.5 oz (122.4 kg)     Body mass index is 34.96 kg/m².     CBC:   Lab Results   Component Value Date    WBC 4.9 05/26/2018    RBC 4.34 05/26/2018    HGB 13.6 05/26/2018    HCT 39.2 05/26/2018    MCV 90.5 05/26/2018    RDW 13.4 05/26/2018     05/26/2018       CMP:   Lab Results   Component Value Date

## 2019-08-23 ENCOUNTER — ANESTHESIA (OUTPATIENT)
Dept: OPERATING ROOM | Age: 54
End: 2019-08-23
Payer: COMMERCIAL

## 2019-08-23 ENCOUNTER — HOSPITAL ENCOUNTER (OUTPATIENT)
Age: 54
Setting detail: OUTPATIENT SURGERY
Discharge: HOME | End: 2019-08-23
Attending: ORTHOPAEDIC SURGERY | Admitting: ORTHOPAEDIC SURGERY
Payer: COMMERCIAL

## 2019-08-23 VITALS
DIASTOLIC BLOOD PRESSURE: 83 MMHG | RESPIRATION RATE: 16 BRPM | OXYGEN SATURATION: 100 % | HEIGHT: 73 IN | TEMPERATURE: 97 F | BODY MASS INDEX: 35.12 KG/M2 | SYSTOLIC BLOOD PRESSURE: 152 MMHG | HEART RATE: 44 BPM | WEIGHT: 265 LBS

## 2019-08-23 VITALS
RESPIRATION RATE: 2 BRPM | DIASTOLIC BLOOD PRESSURE: 62 MMHG | OXYGEN SATURATION: 95 % | SYSTOLIC BLOOD PRESSURE: 115 MMHG

## 2019-08-23 DIAGNOSIS — M84.453A INSUFFICIENCY FRACTURE OF MEDIAL FEMORAL CONDYLE (HCC): ICD-10-CM

## 2019-08-23 DIAGNOSIS — G89.29 CHRONIC PAIN OF RIGHT KNEE: ICD-10-CM

## 2019-08-23 DIAGNOSIS — M84.469G INSUFFICIENCY FRACTURE OF TIBIA WITH DELAYED HEALING, SUBSEQUENT ENCOUNTER: ICD-10-CM

## 2019-08-23 DIAGNOSIS — M23.203 DEGENERATIVE TEAR OF MEDIAL MENISCUS OF RIGHT KNEE: Primary | ICD-10-CM

## 2019-08-23 DIAGNOSIS — M25.561 CHRONIC PAIN OF RIGHT KNEE: ICD-10-CM

## 2019-08-23 PROCEDURE — 3600000004 HC SURGERY LEVEL 4 BASE: Performed by: ORTHOPAEDIC SURGERY

## 2019-08-23 PROCEDURE — 6360000002 HC RX W HCPCS: Performed by: ANESTHESIOLOGY

## 2019-08-23 PROCEDURE — 3700000000 HC ANESTHESIA ATTENDED CARE: Performed by: ORTHOPAEDIC SURGERY

## 2019-08-23 PROCEDURE — 6360000002 HC RX W HCPCS: Performed by: ORTHOPAEDIC SURGERY

## 2019-08-23 PROCEDURE — 7100000001 HC PACU RECOVERY - ADDTL 15 MIN: Performed by: ORTHOPAEDIC SURGERY

## 2019-08-23 PROCEDURE — 2500000003 HC RX 250 WO HCPCS: Performed by: ANESTHESIOLOGY

## 2019-08-23 PROCEDURE — C1713 ANCHOR/SCREW BN/BN,TIS/BN: HCPCS | Performed by: ORTHOPAEDIC SURGERY

## 2019-08-23 PROCEDURE — 2709999900 HC NON-CHARGEABLE SUPPLY: Performed by: ORTHOPAEDIC SURGERY

## 2019-08-23 PROCEDURE — 7100000011 HC PHASE II RECOVERY - ADDTL 15 MIN: Performed by: ORTHOPAEDIC SURGERY

## 2019-08-23 PROCEDURE — 7100000000 HC PACU RECOVERY - FIRST 15 MIN: Performed by: ORTHOPAEDIC SURGERY

## 2019-08-23 PROCEDURE — 2580000003 HC RX 258: Performed by: ANESTHESIOLOGY

## 2019-08-23 PROCEDURE — 6370000000 HC RX 637 (ALT 250 FOR IP): Performed by: ANESTHESIOLOGY

## 2019-08-23 PROCEDURE — 2720000010 HC SURG SUPPLY STERILE: Performed by: ORTHOPAEDIC SURGERY

## 2019-08-23 PROCEDURE — 3600000014 HC SURGERY LEVEL 4 ADDTL 15MIN: Performed by: ORTHOPAEDIC SURGERY

## 2019-08-23 PROCEDURE — 7100000010 HC PHASE II RECOVERY - FIRST 15 MIN: Performed by: ORTHOPAEDIC SURGERY

## 2019-08-23 PROCEDURE — 3700000001 HC ADD 15 MINUTES (ANESTHESIA): Performed by: ORTHOPAEDIC SURGERY

## 2019-08-23 PROCEDURE — 2500000003 HC RX 250 WO HCPCS: Performed by: NURSE ANESTHETIST, CERTIFIED REGISTERED

## 2019-08-23 PROCEDURE — 6360000002 HC RX W HCPCS: Performed by: NURSE ANESTHETIST, CERTIFIED REGISTERED

## 2019-08-23 PROCEDURE — 2500000003 HC RX 250 WO HCPCS: Performed by: ORTHOPAEDIC SURGERY

## 2019-08-23 PROCEDURE — 64447 NJX AA&/STRD FEMORAL NRV IMG: CPT | Performed by: ANESTHESIOLOGY

## 2019-08-23 DEVICE — IMPLANTABLE DEVICE
Type: IMPLANTABLE DEVICE | Status: FUNCTIONAL
Brand: SCP® PF KNEE KIT

## 2019-08-23 RX ORDER — ONDANSETRON 2 MG/ML
INJECTION INTRAMUSCULAR; INTRAVENOUS PRN
Status: DISCONTINUED | OUTPATIENT
Start: 2019-08-23 | End: 2019-08-23 | Stop reason: SDUPTHER

## 2019-08-23 RX ORDER — BUPIVACAINE HYDROCHLORIDE 2.5 MG/ML
INJECTION, SOLUTION EPIDURAL; INFILTRATION; INTRACAUDAL
Status: DISCONTINUED
Start: 2019-08-23 | End: 2019-08-23 | Stop reason: HOSPADM

## 2019-08-23 RX ORDER — PROPOFOL 10 MG/ML
INJECTION, EMULSION INTRAVENOUS PRN
Status: DISCONTINUED | OUTPATIENT
Start: 2019-08-23 | End: 2019-08-23 | Stop reason: SDUPTHER

## 2019-08-23 RX ORDER — DEXAMETHASONE SODIUM PHOSPHATE 4 MG/ML
INJECTION, SOLUTION INTRA-ARTICULAR; INTRALESIONAL; INTRAMUSCULAR; INTRAVENOUS; SOFT TISSUE PRN
Status: DISCONTINUED | OUTPATIENT
Start: 2019-08-23 | End: 2019-08-23 | Stop reason: SDUPTHER

## 2019-08-23 RX ORDER — MIDAZOLAM HYDROCHLORIDE 1 MG/ML
INJECTION INTRAMUSCULAR; INTRAVENOUS
Status: DISCONTINUED
Start: 2019-08-23 | End: 2019-08-23 | Stop reason: HOSPADM

## 2019-08-23 RX ORDER — ACETAMINOPHEN 10 MG/ML
1000 INJECTION, SOLUTION INTRAVENOUS ONCE
Status: COMPLETED | OUTPATIENT
Start: 2019-08-23 | End: 2019-08-23

## 2019-08-23 RX ORDER — SODIUM CHLORIDE 0.9 % (FLUSH) 0.9 %
10 SYRINGE (ML) INJECTION PRN
Status: DISCONTINUED | OUTPATIENT
Start: 2019-08-23 | End: 2019-08-23 | Stop reason: HOSPADM

## 2019-08-23 RX ORDER — LIDOCAINE HYDROCHLORIDE 10 MG/ML
INJECTION, SOLUTION INFILTRATION; PERINEURAL PRN
Status: DISCONTINUED | OUTPATIENT
Start: 2019-08-23 | End: 2019-08-23 | Stop reason: SDUPTHER

## 2019-08-23 RX ORDER — MORPHINE SULFATE 10 MG/ML
2 INJECTION, SOLUTION INTRAMUSCULAR; INTRAVENOUS EVERY 5 MIN PRN
Status: COMPLETED | OUTPATIENT
Start: 2019-08-23 | End: 2019-08-23

## 2019-08-23 RX ORDER — MORPHINE SULFATE 10 MG/ML
1 INJECTION, SOLUTION INTRAMUSCULAR; INTRAVENOUS EVERY 5 MIN PRN
Status: DISCONTINUED | OUTPATIENT
Start: 2019-08-23 | End: 2019-08-23 | Stop reason: HOSPADM

## 2019-08-23 RX ORDER — HYDROCODONE BITARTRATE AND ACETAMINOPHEN 7.5; 325 MG/1; MG/1
1 TABLET ORAL EVERY 6 HOURS PRN
Qty: 28 TABLET | Refills: 0 | Status: SHIPPED | OUTPATIENT
Start: 2019-08-23 | End: 2019-08-30

## 2019-08-23 RX ORDER — OXYCODONE HYDROCHLORIDE AND ACETAMINOPHEN 5; 325 MG/1; MG/1
1 TABLET ORAL PRN
Status: COMPLETED | OUTPATIENT
Start: 2019-08-23 | End: 2019-08-23

## 2019-08-23 RX ORDER — SODIUM CHLORIDE, SODIUM LACTATE, POTASSIUM CHLORIDE, CALCIUM CHLORIDE 600; 310; 30; 20 MG/100ML; MG/100ML; MG/100ML; MG/100ML
INJECTION, SOLUTION INTRAVENOUS CONTINUOUS
Status: DISCONTINUED | OUTPATIENT
Start: 2019-08-23 | End: 2019-08-23 | Stop reason: HOSPADM

## 2019-08-23 RX ORDER — FENTANYL CITRATE 50 UG/ML
INJECTION, SOLUTION INTRAMUSCULAR; INTRAVENOUS PRN
Status: DISCONTINUED | OUTPATIENT
Start: 2019-08-23 | End: 2019-08-23 | Stop reason: SDUPTHER

## 2019-08-23 RX ORDER — FENTANYL CITRATE 50 UG/ML
50 INJECTION, SOLUTION INTRAMUSCULAR; INTRAVENOUS EVERY 5 MIN PRN
Status: DISCONTINUED | OUTPATIENT
Start: 2019-08-23 | End: 2019-08-23 | Stop reason: HOSPADM

## 2019-08-23 RX ORDER — BUPIVACAINE HYDROCHLORIDE 2.5 MG/ML
INJECTION, SOLUTION EPIDURAL; INFILTRATION; INTRACAUDAL PRN
Status: DISCONTINUED | OUTPATIENT
Start: 2019-08-23 | End: 2019-08-23 | Stop reason: SDUPTHER

## 2019-08-23 RX ORDER — OXYCODONE HYDROCHLORIDE AND ACETAMINOPHEN 5; 325 MG/1; MG/1
2 TABLET ORAL PRN
Status: COMPLETED | OUTPATIENT
Start: 2019-08-23 | End: 2019-08-23

## 2019-08-23 RX ORDER — BUPIVACAINE HYDROCHLORIDE 2.5 MG/ML
INJECTION, SOLUTION INFILTRATION; PERINEURAL PRN
Status: DISCONTINUED | OUTPATIENT
Start: 2019-08-23 | End: 2019-08-23 | Stop reason: ALTCHOICE

## 2019-08-23 RX ORDER — LIDOCAINE HYDROCHLORIDE 10 MG/ML
INJECTION, SOLUTION EPIDURAL; INFILTRATION; INTRACAUDAL; PERINEURAL
Status: DISCONTINUED
Start: 2019-08-23 | End: 2019-08-23 | Stop reason: HOSPADM

## 2019-08-23 RX ORDER — MEPERIDINE HYDROCHLORIDE 25 MG/ML
12.5 INJECTION INTRAMUSCULAR; INTRAVENOUS; SUBCUTANEOUS EVERY 5 MIN PRN
Status: DISCONTINUED | OUTPATIENT
Start: 2019-08-23 | End: 2019-08-23 | Stop reason: HOSPADM

## 2019-08-23 RX ORDER — FENTANYL CITRATE 50 UG/ML
25 INJECTION, SOLUTION INTRAMUSCULAR; INTRAVENOUS EVERY 5 MIN PRN
Status: DISCONTINUED | OUTPATIENT
Start: 2019-08-23 | End: 2019-08-23 | Stop reason: HOSPADM

## 2019-08-23 RX ORDER — SODIUM CHLORIDE 0.9 % (FLUSH) 0.9 %
10 SYRINGE (ML) INJECTION EVERY 12 HOURS SCHEDULED
Status: DISCONTINUED | OUTPATIENT
Start: 2019-08-23 | End: 2019-08-23 | Stop reason: HOSPADM

## 2019-08-23 RX ORDER — MIDAZOLAM HYDROCHLORIDE 1 MG/ML
INJECTION INTRAMUSCULAR; INTRAVENOUS PRN
Status: DISCONTINUED | OUTPATIENT
Start: 2019-08-23 | End: 2019-08-23 | Stop reason: SDUPTHER

## 2019-08-23 RX ORDER — ONDANSETRON 2 MG/ML
4 INJECTION INTRAMUSCULAR; INTRAVENOUS
Status: DISCONTINUED | OUTPATIENT
Start: 2019-08-23 | End: 2019-08-23 | Stop reason: HOSPADM

## 2019-08-23 RX ADMIN — FENTANYL CITRATE 50 MCG: 50 INJECTION INTRAMUSCULAR; INTRAVENOUS at 07:44

## 2019-08-23 RX ADMIN — MORPHINE SULFATE 2 MG: 10 INJECTION INTRAVENOUS at 08:36

## 2019-08-23 RX ADMIN — PROPOFOL 300 MG: 10 INJECTION, EMULSION INTRAVENOUS at 07:45

## 2019-08-23 RX ADMIN — Medication 3 G: at 07:41

## 2019-08-23 RX ADMIN — SODIUM CHLORIDE, POTASSIUM CHLORIDE, SODIUM LACTATE AND CALCIUM CHLORIDE: 600; 310; 30; 20 INJECTION, SOLUTION INTRAVENOUS at 06:55

## 2019-08-23 RX ADMIN — MIDAZOLAM 2 MG: 1 INJECTION INTRAMUSCULAR; INTRAVENOUS at 07:00

## 2019-08-23 RX ADMIN — BUPIVACAINE HYDROCHLORIDE 30 ML: 2.5 INJECTION, SOLUTION EPIDURAL; INFILTRATION; INTRACAUDAL; PERINEURAL at 07:05

## 2019-08-23 RX ADMIN — FAMOTIDINE 20 MG: 10 INJECTION, SOLUTION INTRAVENOUS at 06:57

## 2019-08-23 RX ADMIN — OXYCODONE HYDROCHLORIDE AND ACETAMINOPHEN 1 TABLET: 5; 325 TABLET ORAL at 09:03

## 2019-08-23 RX ADMIN — FENTANYL CITRATE 50 MCG: 50 INJECTION INTRAMUSCULAR; INTRAVENOUS at 08:10

## 2019-08-23 RX ADMIN — MORPHINE SULFATE 2 MG: 10 INJECTION INTRAVENOUS at 08:49

## 2019-08-23 RX ADMIN — LIDOCAINE HYDROCHLORIDE 50 MG: 10 INJECTION, SOLUTION INFILTRATION; PERINEURAL at 07:45

## 2019-08-23 RX ADMIN — LIDOCAINE HYDROCHLORIDE 0.1 ML: 10 INJECTION, SOLUTION EPIDURAL; INFILTRATION; INTRACAUDAL; PERINEURAL at 06:55

## 2019-08-23 RX ADMIN — Medication 3 G: at 07:38

## 2019-08-23 RX ADMIN — SODIUM CHLORIDE, POTASSIUM CHLORIDE, SODIUM LACTATE AND CALCIUM CHLORIDE: 600; 310; 30; 20 INJECTION, SOLUTION INTRAVENOUS at 06:41

## 2019-08-23 RX ADMIN — DEXAMETHASONE SODIUM PHOSPHATE 8 MG: 4 INJECTION, SOLUTION INTRAMUSCULAR; INTRAVENOUS at 07:50

## 2019-08-23 RX ADMIN — ACETAMINOPHEN 1000 MG: 10 INJECTION, SOLUTION INTRAVENOUS at 06:56

## 2019-08-23 RX ADMIN — MORPHINE SULFATE 2 MG: 10 INJECTION INTRAVENOUS at 08:59

## 2019-08-23 RX ADMIN — MORPHINE SULFATE 2 MG: 10 INJECTION INTRAVENOUS at 08:42

## 2019-08-23 RX ADMIN — FENTANYL CITRATE 50 MCG: 50 INJECTION INTRAMUSCULAR; INTRAVENOUS at 07:55

## 2019-08-23 RX ADMIN — FENTANYL CITRATE 50 MCG: 50 INJECTION INTRAMUSCULAR; INTRAVENOUS at 07:41

## 2019-08-23 RX ADMIN — MORPHINE SULFATE 2 MG: 10 INJECTION INTRAVENOUS at 08:54

## 2019-08-23 RX ADMIN — ONDANSETRON 4 MG: 2 INJECTION, SOLUTION INTRAMUSCULAR; INTRAVENOUS at 07:50

## 2019-08-23 ASSESSMENT — PULMONARY FUNCTION TESTS
PIF_VALUE: 3
PIF_VALUE: 2
PIF_VALUE: 3
PIF_VALUE: 4
PIF_VALUE: 2
PIF_VALUE: 3
PIF_VALUE: 2
PIF_VALUE: 2
PIF_VALUE: 3
PIF_VALUE: 2
PIF_VALUE: 3
PIF_VALUE: 12
PIF_VALUE: 3
PIF_VALUE: 2
PIF_VALUE: 3
PIF_VALUE: 2
PIF_VALUE: 3
PIF_VALUE: 1
PIF_VALUE: 3
PIF_VALUE: 0
PIF_VALUE: 3
PIF_VALUE: 1
PIF_VALUE: 3
PIF_VALUE: 1
PIF_VALUE: 2
PIF_VALUE: 3
PIF_VALUE: 15
PIF_VALUE: 16
PIF_VALUE: 2
PIF_VALUE: 3
PIF_VALUE: 0

## 2019-08-23 ASSESSMENT — PAIN DESCRIPTION - LOCATION
LOCATION: KNEE

## 2019-08-23 ASSESSMENT — PAIN DESCRIPTION - ORIENTATION
ORIENTATION: LEFT

## 2019-08-23 ASSESSMENT — PAIN DESCRIPTION - PAIN TYPE
TYPE: SURGICAL PAIN

## 2019-08-23 ASSESSMENT — PAIN DESCRIPTION - DESCRIPTORS
DESCRIPTORS: THROBBING

## 2019-08-23 ASSESSMENT — PAIN SCALES - GENERAL
PAINLEVEL_OUTOF10: 6
PAINLEVEL_OUTOF10: 7
PAINLEVEL_OUTOF10: 8
PAINLEVEL_OUTOF10: 7
PAINLEVEL_OUTOF10: 8
PAINLEVEL_OUTOF10: 8

## 2019-08-23 ASSESSMENT — PAIN - FUNCTIONAL ASSESSMENT: PAIN_FUNCTIONAL_ASSESSMENT: 0-10

## 2019-08-23 NOTE — BRIEF OP NOTE
Brief Postoperative Note  ______________________________________________________________    Patient: Fausto Rushing  YOB: 1965  MRN: [id number]  Date of Procedure: 8/23/2019    Pre-Op Diagnosis: RIGHT KNEE MEDIAL MENISCUS TEAR, INSUFFICIENCY FRACTURE MEDIAL TIBIAL PLATEAU, MEDIAL FEMORAL CONDYLE    Post-Op Diagnosis: Same       Procedure(s):  RIGHT KNEE VIDEO ARTHROSCOPY, INTERNAL FIXATION MEDIAL TIBIAL PLATEAU, MEDIAL FEMORAL CONDYLE INSUFFICIENCY FRACTURE WITH BONE SUBSTITUTE      Anesthesia: General    Surgeon(s):  Luis Razo MD    Assistant:     Estimated Blood Loss (mL): less than 50     Complications: None    Specimens:   * No specimens in log *    Implants:  * No implants in log *      Drains: * No LDAs found *    Findings: Rasheed Akhtar MD  Date: 8/23/2019  Time: 8:13 AM

## 2019-08-23 NOTE — ANESTHESIA PROCEDURE NOTES
Peripheral Block    Patient location during procedure: pre-op  Staffing  Anesthesiologist: Lars Millan MD  Performed: anesthesiologist   Preanesthetic Checklist  Completed: patient identified, site marked, pre-op evaluation, timeout performed, IV checked, risks and benefits discussed, monitors and equipment checked, anesthesia consent given, oxygen available and patient being monitored  Peripheral Block  Patient position: supine  Prep: ChloraPrep  Patient monitoring: continuous pulse ox, frequent blood pressure checks and IV access  Block type: Femoral  Laterality: right  Injection technique: single-shot  Procedures: ultrasound guided  Local infiltration: lidocaine  Infiltration strength: 2 %  Dose: 2 mL  Local infiltration: lidocaine  Needle  Needle type: combined needle/nerve stimulator   Needle gauge: 21 G  Needle length: 10 cm  Needle localization: anatomical landmarks and ultrasound guidance  Assessment  Injection assessment: negative aspiration for heme, no paresthesia on injection and local visualized surrounding nerve on ultrasound  Slow fractionated injection: yes  Additional Notes  Consent, timeout, iv sed, prep, u/s localization, lido skin/deep, needle placed, good LA spread, pic taken, neg asps, pt colby well!   Reason for block: post-op pain management and at surgeon's request

## 2019-08-23 NOTE — OP NOTE
meniscectomy was performed with both rigid and  power instrumentation leaving well-contoured and balanced inner rim. The patellofemoral joint, ACL, PCL, and lateral compartments were  otherwise free of pathology. He had a very little degenerative change  of the medial compartment. The knee was then evacuated and instilled  with 20 mL of 0.25% Marcaine plain. The port was repaired with 4-0  nylon in a figure-of-eight fashion. Based on preoperative review of this patients right knee MRI, the  location of the bone marrow lesion, consistent with an insufficiency or  stress fracture, in the medial femoral condyle and medial tibial plateau  was identified. Preoperative surgical planning allowed for  determination of the optimal method for accessing the lesion. Intraoperatively, image fluoroscopy combined with bone targeting  instruments from [facility] [place] were used to guide surgical  instruments into the proximity of the subchondral medial femoral condyle  and medial tibial plateau fracture. Specifically, the [employer] Accuport injection cannula was placed in the subchondral bone. Standard repair methodology was used to treat the subchondral bone  defect in the medial femoral condyle and medial tibial plateau. Image  fluoroscopy was utilized to confirm accurate insertion of the Accuport  injection cannula into the subchondral bone. After insertion, fracture  stabilization was performed by injecting 3 mL of [employer]  Accufill bone substitute into the medial femoral condyle and 2 mL in the  medial tibial plateau. Image fluoroscopy was used to monitor the  injection process and insure injection of the bone substitute into the  subchondral bone so that following polymerization, the bone substitute  stabilized the fracture and facilitated fracture repair. The injection  wounds were closed and sterile dressing was applied.         Hernando Lowe MD    D: 08/23/2019 8:55:49 T:  08/23/2019 10:36:05     [email or web address]  Job#: [id number]     Doc#: [id number]    CC:

## 2019-08-23 NOTE — ANESTHESIA POSTPROCEDURE EVALUATION
Department of Anesthesiology  Postprocedure Note    Patient: Jt Arrieta  MRN: [id number]  YOB: 1965  Date of evaluation: 8/23/2019  Time:  9:14 AM     Procedure Summary     Date:  08/23/19 Room / Location:  [facility] OR    Anesthesia Start:  9021 Anesthesia Stop:  8810    Procedure:  RIGHT KNEE VIDEO ARTHROSCOPY, INTERNAL FIXATION MEDIAL TIBIAL PLATEAU, MEDIAL FEMORAL CONDYLE INSUFFICIENCY FRACTURE WITH BONE SUBSTITUTE   (Right Knee) Diagnosis:  (RIGHT KNEE MEDIAL MENISCUS TEAR, INSUFFICIENCY FRACTURE MEDIAL TIBIAL PLATEAU, MEDIAL FEMORAL CONDYLE)    Surgeon:  Callum Moreno MD Responsible Provider:  Carmina Escalera MD    Anesthesia Type:  general ASA Status:  2          Anesthesia Type: general    Miriam Phase I: Miriam Score: 10    Miriam Phase II: Miriam Score: 10    Last vitals: Reviewed and per EMR flowsheets.    Vitals:    08/23/19 0836 08/23/19 0841 08/23/19 0849 08/23/19 0900   BP: (!) 144/81 (!) 162/91  (!) 152/83   Pulse: 52 (!) 45 55 (!) 44   Resp: 16 16 16 16   Temp:    97 °F (36.1 °C)   TempSrc:       SpO2: 97% 97% 99% 100%   Weight:       Height:           Anesthesia Post Evaluation    Patient location during evaluation: bedside  Patient participation: complete - patient participated  Level of consciousness: awake and alert  Airway patency: patent  Nausea & Vomiting: no nausea  Complications: no  Cardiovascular status: hemodynamically stable  Respiratory status: acceptable  Hydration status: euvolemic

## 2019-08-28 DIAGNOSIS — M23.203 DEGENERATIVE TEAR OF MEDIAL MENISCUS OF RIGHT KNEE: ICD-10-CM

## 2019-08-28 DIAGNOSIS — M84.469G INSUFFICIENCY FRACTURE OF TIBIA WITH DELAYED HEALING, SUBSEQUENT ENCOUNTER: ICD-10-CM

## 2019-08-28 DIAGNOSIS — M84.453A INSUFFICIENCY FRACTURE OF MEDIAL FEMORAL CONDYLE (HCC): Primary | ICD-10-CM

## 2019-08-28 RX ORDER — HYDROCODONE BITARTRATE AND ACETAMINOPHEN 7.5; 325 MG/1; MG/1
1 TABLET ORAL EVERY 6 HOURS PRN
Qty: 28 TABLET | Refills: 0 | Status: SHIPPED | OUTPATIENT
Start: 2019-08-28 | End: 2019-08-31

## 2019-09-03 ENCOUNTER — OFFICE VISIT (OUTPATIENT)
Dept: ORTHOPEDIC SURGERY | Age: 54
End: 2019-09-03

## 2019-09-03 VITALS — WEIGHT: 276.9 LBS | BODY MASS INDEX: 37.5 KG/M2 | HEIGHT: 72 IN

## 2019-09-03 DIAGNOSIS — M23.203 DEGENERATIVE TEAR OF MEDIAL MENISCUS OF RIGHT KNEE: Primary | ICD-10-CM

## 2019-09-03 DIAGNOSIS — M84.453A INSUFFICIENCY FRACTURE OF MEDIAL FEMORAL CONDYLE (HCC): ICD-10-CM

## 2019-09-03 DIAGNOSIS — M84.469D INSUFFICIENCY FRACTURE OF TIBIA WITH ROUTINE HEALING, SUBSEQUENT ENCOUNTER: ICD-10-CM

## 2019-09-03 PROCEDURE — 99024 POSTOP FOLLOW-UP VISIT: CPT | Performed by: ORTHOPAEDIC SURGERY

## 2019-09-03 NOTE — PROGRESS NOTES
FOLLOW-UP VISIT      The patient returns for follow-up s/p right knee video arthroscopy with partial medial meniscectomy and sub-chondroplasty of the medial femoral condyle and medial tibial plateau    Date of Surgery    8/23/2019    Wound Status    Sutures Removed, Incisions are healing well with no surrounding erythema, and minimal ecchymosis. Exam    He has some swelling but no effusion or evidence of infection DVT. He is tolerating walking without amatory aids and feels much better they did a few days ago.     Plan    Slow return to normal activity    Follow-up Appointment    4 weeks

## 2019-09-13 DIAGNOSIS — M84.469D INSUFFICIENCY FRACTURE OF TIBIA WITH ROUTINE HEALING, SUBSEQUENT ENCOUNTER: ICD-10-CM

## 2019-09-13 DIAGNOSIS — M84.453A INSUFFICIENCY FRACTURE OF MEDIAL FEMORAL CONDYLE (HCC): Primary | ICD-10-CM

## 2019-09-16 RX ORDER — HYDROCODONE BITARTRATE AND ACETAMINOPHEN 5; 325 MG/1; MG/1
1 TABLET ORAL EVERY 6 HOURS PRN
Qty: 28 TABLET | Refills: 0 | Status: SHIPPED | OUTPATIENT
Start: 2019-09-16 | End: 2019-09-23

## 2019-09-24 ENCOUNTER — OFFICE VISIT (OUTPATIENT)
Dept: ORTHOPEDIC SURGERY | Age: 54
End: 2019-09-24
Payer: COMMERCIAL

## 2019-09-24 ENCOUNTER — TELEPHONE (OUTPATIENT)
Dept: ORTHOPEDIC SURGERY | Age: 54
End: 2019-09-24

## 2019-09-24 VITALS — HEIGHT: 72 IN | RESPIRATION RATE: 12 BRPM | WEIGHT: 276.9 LBS | BODY MASS INDEX: 37.5 KG/M2

## 2019-09-24 DIAGNOSIS — M25.461 EFFUSION OF RIGHT KNEE: Primary | ICD-10-CM

## 2019-09-24 DIAGNOSIS — M84.469D INSUFFICIENCY FRACTURE OF TIBIA WITH ROUTINE HEALING, SUBSEQUENT ENCOUNTER: ICD-10-CM

## 2019-09-24 DIAGNOSIS — M23.203 DEGENERATIVE TEAR OF MEDIAL MENISCUS OF RIGHT KNEE: ICD-10-CM

## 2019-09-24 DIAGNOSIS — M84.469G INSUFFICIENCY FRACTURE OF TIBIA WITH DELAYED HEALING, SUBSEQUENT ENCOUNTER: ICD-10-CM

## 2019-09-24 DIAGNOSIS — M25.561 RIGHT KNEE PAIN, UNSPECIFIED CHRONICITY: ICD-10-CM

## 2019-09-24 PROCEDURE — 20610 DRAIN/INJ JOINT/BURSA W/O US: CPT | Performed by: ORTHOPAEDIC SURGERY

## 2019-09-24 PROCEDURE — 99024 POSTOP FOLLOW-UP VISIT: CPT | Performed by: ORTHOPAEDIC SURGERY

## 2019-09-24 RX ORDER — METHYLPREDNISOLONE ACETATE 40 MG/ML
40 INJECTION, SUSPENSION INTRA-ARTICULAR; INTRALESIONAL; INTRAMUSCULAR; SOFT TISSUE ONCE
Status: COMPLETED | OUTPATIENT
Start: 2019-09-24 | End: 2019-09-24

## 2019-09-24 RX ADMIN — METHYLPREDNISOLONE ACETATE 40 MG: 40 INJECTION, SUSPENSION INTRA-ARTICULAR; INTRALESIONAL; INTRAMUSCULAR; SOFT TISSUE at 10:13

## 2019-09-24 NOTE — LETTER
[address]  Phone: [phone]  Fax: [phone]    Clara Maurer MD        September 24, 2019     Patient: Gerard Covarrubias   YOB: 1965   Date of Visit: 9/24/2019       To Whom It May Concern: It is my medical opinion that Valerie Moreno should remain out of work until 10/22/19. If you have any questions or concerns, please don't hesitate to call. Sincerely,          Wilder Kumar.  MD Clara Robledo MD

## 2019-09-24 NOTE — LETTER
[address]  Phone: [phone]  Fax: [phone]    Jorge Ambrocio MD        September 24, 2019     Patient: Idris Clancy   YOB: 1965   Date of Visit: 9/24/2019       To Whom it May Concern:    Nam Crabtree was seen in my clinic on 9/24/2019. He may return to work on 11/4/19. with no restrictions. If you have any questions or concerns, please don't hesitate to call. Sincerely,           Jericho Hewitt.  MD Jorge Puente MD

## 2020-08-17 ENCOUNTER — TELEPHONE (OUTPATIENT)
Dept: FAMILY MEDICINE CLINIC | Age: 55
End: 2020-08-17

## 2020-08-17 NOTE — TELEPHONE ENCOUNTER
----- Message from Mahin Lennon sent at 8/17/2020  8:25 AM EDT -----  Subject: Appointment Request    Reason for Call: Routine Physical Exam    QUESTIONS  Type of Appointment? Established Patient  Reason for appointment request? Other - needs appt asap  Additional Information for Provider? needs physical but having back   problem needs sooner appt   ---------------------------------------------------------------------------  --------------  CALL BACK INFO  What is the best way for the office to contact you? OK to leave message on   voicemail  Do not leave any message   patient will call back for answer  Preferred Call Back Phone Number? [phone]  ---------------------------------------------------------------------------  --------------  SCRIPT ANSWERS  Relationship to Patient? Other  Representative Name? charlie kristal  Additional information verified (besides Name and Date of Birth)? Address  Appointment reason? Well Care/Follow Ups  Select a Well Care/Follow Ups appointment reason? Adult Physical Exam   [Medicare Annual Wellness   AWV   PAP   Pelvic]  (Is the patient requesting to be seen urgently for their symptoms?)? No  (If the patient is female   ask this question) Are you requesting a pap smear with your physical   exam? No  (Patient is Medicare and requesting Annual Wellness Visit)? No  Have you been diagnosed with   tested for   or told that you are suspected of having COVID-19 (Coronavirus)? No  Have you had a fever or taken medication to treat a fever within the past   3 days? No  Have you had a cough   shortness of breath or flu-like symptoms within the past 3 days? No  Do you currently have flu-like symptoms including fever or chills   cough   shortness of breath   or difficulty breathing   or new loss of taste or smell? No  (Service Expert  click yes below to proceed with [employer] As Usual   Scheduling)?  Yes

## 2020-08-19 ENCOUNTER — HOSPITAL ENCOUNTER (OUTPATIENT)
Dept: GENERAL RADIOLOGY | Age: 55
Discharge: HOME | End: 2020-08-19
Payer: COMMERCIAL

## 2020-08-19 ENCOUNTER — OFFICE VISIT (OUTPATIENT)
Dept: FAMILY MEDICINE CLINIC | Age: 55
End: 2020-08-19
Payer: COMMERCIAL

## 2020-08-19 ENCOUNTER — HOSPITAL ENCOUNTER (OUTPATIENT)
Age: 55
Discharge: HOME | End: 2020-08-19
Payer: COMMERCIAL

## 2020-08-19 VITALS
TEMPERATURE: 97.6 F | SYSTOLIC BLOOD PRESSURE: 135 MMHG | HEIGHT: 73 IN | DIASTOLIC BLOOD PRESSURE: 80 MMHG | BODY MASS INDEX: 36.13 KG/M2 | WEIGHT: 272.6 LBS | HEART RATE: 76 BPM | OXYGEN SATURATION: 96 %

## 2020-08-19 LAB
A/G RATIO: 1.9 (ref 1.1–2.2)
ALBUMIN SERPL-MCNC: 4.6 G/DL (ref 3.4–5)
ALP BLD-CCNC: 57 U/L (ref 40–129)
ALT SERPL-CCNC: 36 U/L (ref 10–40)
ANION GAP SERPL CALCULATED.3IONS-SCNC: 11 MMOL/L (ref 3–16)
AST SERPL-CCNC: 36 U/L (ref 15–37)
BASOPHILS ABSOLUTE: 0 K/UL (ref 0–0.2)
BASOPHILS RELATIVE PERCENT: 0.9 %
BILIRUB SERPL-MCNC: 0.5 MG/DL (ref 0–1)
BUN BLDV-MCNC: 18 MG/DL (ref 7–20)
CALCIUM SERPL-MCNC: 9.5 MG/DL (ref 8.3–10.6)
CHLORIDE BLD-SCNC: 105 MMOL/L (ref 99–110)
CHOLESTEROL, TOTAL: 168 MG/DL (ref 0–199)
CO2: 27 MMOL/L (ref 21–32)
CREAT SERPL-MCNC: 0.7 MG/DL (ref 0.9–1.3)
EOSINOPHILS ABSOLUTE: 0.1 K/UL (ref 0–0.6)
EOSINOPHILS RELATIVE PERCENT: 1.4 %
GFR AFRICAN AMERICAN: >60
GFR NON-AFRICAN AMERICAN: >60
GLOBULIN: 2.4 G/DL
GLUCOSE BLD-MCNC: 105 MG/DL (ref 70–99)
HCT VFR BLD CALC: 43.3 % (ref 40.5–52.5)
HDLC SERPL-MCNC: 58 MG/DL (ref 40–60)
HEMOGLOBIN: 14.4 G/DL (ref 13.5–17.5)
LDL CHOLESTEROL CALCULATED: 88 MG/DL
LYMPHOCYTES ABSOLUTE: 1.8 K/UL (ref 1–5.1)
LYMPHOCYTES RELATIVE PERCENT: 32.7 %
MCH RBC QN AUTO: 30.8 PG (ref 26–34)
MCHC RBC AUTO-ENTMCNC: 33.3 G/DL (ref 31–36)
MCV RBC AUTO: 92.4 FL (ref 80–100)
MONOCYTES ABSOLUTE: 0.5 K/UL (ref 0–1.3)
MONOCYTES RELATIVE PERCENT: 9.4 %
NEUTROPHILS ABSOLUTE: 3.1 K/UL (ref 1.7–7.7)
NEUTROPHILS RELATIVE PERCENT: 55.6 %
PDW BLD-RTO: 13.9 % (ref 12.4–15.4)
PLATELET # BLD: 321 K/UL (ref 135–450)
PMV BLD AUTO: 8.6 FL (ref 5–10.5)
POTASSIUM SERPL-SCNC: 4.5 MMOL/L (ref 3.5–5.1)
PROSTATE SPECIFIC ANTIGEN: 1.9 NG/ML (ref 0–4)
RBC # BLD: 4.68 M/UL (ref 4.2–5.9)
SODIUM BLD-SCNC: 143 MMOL/L (ref 136–145)
TOTAL PROTEIN: 7 G/DL (ref 6.4–8.2)
TRIGL SERPL-MCNC: 111 MG/DL (ref 0–150)
VLDLC SERPL CALC-MCNC: 22 MG/DL
WBC # BLD: 5.5 K/UL (ref 4–11)

## 2020-08-19 PROCEDURE — 81002 URINALYSIS NONAUTO W/O SCOPE: CPT | Performed by: FAMILY MEDICINE

## 2020-08-19 PROCEDURE — 73030 X-RAY EXAM OF SHOULDER: CPT

## 2020-08-19 PROCEDURE — 99396 PREV VISIT EST AGE 40-64: CPT | Performed by: FAMILY MEDICINE

## 2020-08-19 PROCEDURE — 72050 X-RAY EXAM NECK SPINE 4/5VWS: CPT

## 2020-08-19 RX ORDER — PREDNISONE 20 MG/1
20 TABLET ORAL 2 TIMES DAILY
Qty: 12 TABLET | Refills: 0 | Status: SHIPPED | OUTPATIENT
Start: 2020-08-19 | End: 2020-08-25

## 2020-08-19 ASSESSMENT — ENCOUNTER SYMPTOMS
CONSTIPATION: 0
ABDOMINAL PAIN: 0
CHEST TIGHTNESS: 0
COUGH: 0
BLOOD IN STOOL: 0
SHORTNESS OF BREATH: 0

## 2020-08-19 NOTE — PROGRESS NOTES
Subjective:      Patient ID: Hildegarde Goldberg is a 63 y.o. male. HPI  In for sg exam.2 week Hx Lt sh pain-no trauma-starts in neck & into Lt forearm. Left shoulder pops on occasion. Had knee surgery approximately 1 year ago and he is doing fine there. He is due for some lab work. He is not on any regular medication. He states that until this neck and shoulder problem he feels very well. He denies any other issues to discuss. Prior to Visit Medications :  Medication predniSONE (DELTASONE) 20 MG tablet, Sig Take 1 tablet by mouth 2 times daily for 6 days, Taking? Yes, Authorizing Provider Shmuel Ulloa, DO    Medication meloxicam (MOBIC) 15 MG tablet, Sig TAKE 1 TABLET BY MOUTH DAILY, Taking? , Authorizing Provider Sharyle Eden, MD    Medication diclofenac sodium 1 % GEL, Sig APPLY 4 GRAMS EXTERNALLY TO THE AFFECTED AREA FOUR TIMES DAILY  Patient not taking: Reported on 8/19/2020, Taking? , Authorizing Provider Sharyle Eden, MD    Medication fluticasone (FLONASE) 50 MCG/ACT nasal spray, Sig 2 SPRAYS IN EACH NOSTRIL DAILY, Taking? , Authorizing Provider Ibeth Ulloa, DO    Medication ibuprofen (ADVIL;MOTRIN) 200 MG tablet, Sig Take 200 mg by mouth every 6 hours as needed for Pain, Taking? , Authorizing Provider Historical Provider, MD      Past Medical History:  6/25/2018: Acute left-sided low back pain with left-sided sciatica  No date: Allergic rhinitis  5/14/2019: Degenerative tear of medial meniscus of right knee  06/08/2018: No history of previous surgery      Comment:  colonoscopy        Review of Systems    Review of Systems   Constitutional: Negative for fever and unexpected weight change. HENT: Negative for congestion and postnasal drip. Eyes: Negative for visual disturbance. Respiratory: Negative for cough, chest tightness and shortness of breath. Cardiovascular: Negative for chest pain, palpitations and leg swelling.    Gastrointestinal: Negative for abdominal pain, blood in stool and constipation. Genitourinary: Negative for frequency and hematuria. Musculoskeletal: Negative for arthralgias and myalgias. See HPI. Skin: Negative for rash. Neurological: Negative for tremors and headaches. Psychiatric/Behavioral: Negative for sleep disturbance. The patient is not nervous/anxious. Objective:   Physical Exam      Physical Exam  Constitutional:       Appearance: Normal appearance. He is well-developed. He is obese. HENT:      Head: Normocephalic. Mouth/Throat:      Mouth: Mucous membranes are moist.      Pharynx: Oropharynx is clear. Eyes:      Conjunctiva/sclera: Conjunctivae normal.   Neck:      Musculoskeletal: Neck supple. Thyroid: No thyromegaly. Vascular: No carotid bruit. Comments: Mild tenderness mid to lower left cervical and upper left thoracic paravertebral musculature. Had extension and right rotation does produce some discomfort in the left arm. Cardiovascular:      Rate and Rhythm: Normal rate and regular rhythm. Pulses: Normal pulses. Heart sounds: Normal heart sounds. Pulmonary:      Effort: Pulmonary effort is normal.      Breath sounds: Normal breath sounds. Abdominal:      General: There is no distension. Palpations: Abdomen is soft. There is no mass. Tenderness: There is no abdominal tenderness. Musculoskeletal:      Comments: Normal range of motion of left shoulder-resisted abduction at 90 degrees was negative   Lymphadenopathy:      Cervical: No cervical adenopathy. Skin:     General: Skin is warm and dry. Neurological:      Mental Status: He is alert and oriented to person, place, and time. Psychiatric:         Mood and Affect: Mood normal.         Behavior: Behavior normal.         Thought Content: Thought content normal.         Judgment: Judgment normal.         Assessment:      1. Annual physical exam    2. Cervical radiculopathy    3. Acute pain of left shoulder    4.  Special screening for malignant neoplasm of prostate            Plan:      Nam was seen today for back pain. Diagnoses and all orders for this visit:    Annual physical exam  -     CBC Auto Differential  -     Comprehensive Metabolic Panel  -     Lipid Panel  AGC-work on slow weight loss by reducing carbs and increasing activity. Lab now  Cervical radiculopathy  -     XR CERVICAL SPINE W OBLIQUES FLEXION AND EXTENSION  Prescription sent for prednisone 20 mg twice a day and also cervical spine ordered  Acute pain of left shoulder  -     XR CERVICAL SPINE W OBLIQUES FLEXION AND EXTENSION  -     XR SHOULDER LEFT (1 VIEW); Future  As above regarding prednisone and will do x-ray of the left shoulder  Special screening for malignant neoplasm of prostate  -     Psa screening  Lab now  Other orders  -     predniSONE (DELTASONE) 20 MG tablet;  Take 1 tablet by mouth 2 times daily for 6 days      See me 1 year        Danii Solid, DO

## 2020-08-19 NOTE — PATIENT INSTRUCTIONS
Annual physical exam  -     CBC Auto Differential  -     Comprehensive Metabolic Panel  -     Lipid Panel  AGC-work on slow weight loss by reducing carbs and increasing activity. Lab now  Cervical radiculopathy  -     XR CERVICAL SPINE W OBLIQUES FLEXION AND EXTENSION  Prescription sent for prednisone 20 mg twice a day and also cervical spine ordered  Acute pain of left shoulder  -     XR CERVICAL SPINE W OBLIQUES FLEXION AND EXTENSION  -     XR SHOULDER LEFT (1 VIEW); Future  As above regarding prednisone and will do x-ray of the left shoulder  Special screening for malignant neoplasm of prostate  -     Psa screening  Lab now  Other orders  -     predniSONE (DELTASONE) 20 MG tablet;  Take 1 tablet by mouth 2 times daily for 6 days      See me 1 year        Alonso [address], DO

## 2020-09-09 ENCOUNTER — HOSPITAL ENCOUNTER (OUTPATIENT)
Dept: MRI IMAGING | Age: 55
Discharge: HOME | End: 2020-09-09
Payer: COMMERCIAL

## 2020-09-09 PROCEDURE — 72141 MRI NECK SPINE W/O DYE: CPT

## 2020-10-15 ENCOUNTER — OFFICE VISIT (OUTPATIENT)
Dept: ORTHOPEDIC SURGERY | Age: 55
End: 2020-10-15
Payer: COMMERCIAL

## 2020-10-15 ENCOUNTER — TELEPHONE (OUTPATIENT)
Dept: FAMILY MEDICINE CLINIC | Age: 55
End: 2020-10-15

## 2020-10-15 VITALS — BODY MASS INDEX: 36.05 KG/M2 | WEIGHT: 272 LBS | HEIGHT: 73 IN

## 2020-10-15 PROCEDURE — 99243 OFF/OP CNSLTJ NEW/EST LOW 30: CPT | Performed by: PHYSICIAN ASSISTANT

## 2020-10-15 NOTE — PROGRESS NOTES
History of present illness:   Mr. Artemio Kimball is a pleasant 63 y.o. old male with a PMH right medial meniscus tear and allergic rhinitis kindly referred by Dr. Kathryn Lemus for consultation regarding Mr. Gabino Gabriel neck and left arm pain. He states the pain began insidiously about 3-6 months ago. His pain has steadily decreased since then. He rates his neck pain 3/10 and shoulder and arm pain 0/10. He describes the pain as mild and aching. States it was shooting into his left arm but has overall resolved. The arm pain initially radiated to his left forearm and hand. He denies numbness and tingling in his arm. He denies weakness of his arm. Patient denies difficulty with fine motor skills. He denies lower extremity symptoms, gait abnormality and bowel or bladder dysfunction. The pain does not disrupt his sleep. He has tried oral steroids with relief. He takes Mobic. Physical examination:   Mr. Gabino Gabriel most recent vitals:  Vitals  Height: 6' 0.99\" (185.4 cm)  Weight: 272 lb (123.4 kg)  Body mass index is 35.89 kg/m². General Exam:  He is well-developed and well-nourished, is in obvious pain and alert and oriented to person, place, and time. He demonstrates appropriate mood and affect. He walks with a normal gait. HEENT:   His cervical flexion, extension, and axial rotation are moderately reduced with pain. His skin is warm and dry. He has no tenderness over his cervical spine and no obvious muscle spasm. The skin over his cervical spine is normal without surgical scar. Upper extremities:  He has 5/5 strength of his interosseous muscles, wrist dorsiflexors and volarflexors, biceps, triceps, deltoids, and internal and external rotators of his shoulders, bilaterally. His biceps, triceps, bracheoradialis, quadriceps and achilles reflexes are 2+, bilaterally. Sensation is intact to light touchfrom C6 to C8. He has no clonus and negative Wagoner's bilaterally.      Lower extremities:  Normal

## 2020-10-15 NOTE — TELEPHONE ENCOUNTER
----- Message from Tasneem Badillo MD sent at 10/15/2020  2:31 PM EDT -----  Danette Bautista,    I hope you and yours are well. I examined Cris Mckinney in the office on October 15th. I recommended a home exercise program including cervical traction for cervical spondylosis with foraminal stenosis. He may call to schedule physical therapy and possibly an epidural injection if his symptoms persist after those. By the way, I am finally extending my office hours. I would be honored to see your patients now without MRIs or prior nonoperative treatment. Please feel free to have your office call Liana at [phone] to get your patients seen quickly. I appreciate your referrals.     Thanks and best regards,  Oris Baumgarten

## 2021-11-23 ENCOUNTER — OFFICE VISIT (OUTPATIENT)
Dept: FAMILY MEDICINE CLINIC | Age: 56
End: 2021-11-23
Payer: COMMERCIAL

## 2021-11-23 VITALS
WEIGHT: 273.8 LBS | BODY MASS INDEX: 37.08 KG/M2 | OXYGEN SATURATION: 96 % | HEART RATE: 78 BPM | SYSTOLIC BLOOD PRESSURE: 112 MMHG | HEIGHT: 72 IN | DIASTOLIC BLOOD PRESSURE: 74 MMHG

## 2021-11-23 DIAGNOSIS — K57.30 DIVERTICULOSIS OF LARGE INTESTINE WITHOUT HEMORRHAGE: ICD-10-CM

## 2021-11-23 DIAGNOSIS — Z00.00 ENCOUNTER FOR WELL ADULT EXAM WITHOUT ABNORMAL FINDINGS: Primary | ICD-10-CM

## 2021-11-23 LAB
BASOPHILS ABSOLUTE: 0 K/UL (ref 0–0.2)
BASOPHILS RELATIVE PERCENT: 0.8 %
EOSINOPHILS ABSOLUTE: 0.1 K/UL (ref 0–0.6)
EOSINOPHILS RELATIVE PERCENT: 1.9 %
HCT VFR BLD CALC: 42 % (ref 40.5–52.5)
HEMOGLOBIN: 14.1 G/DL (ref 13.5–17.5)
LYMPHOCYTES ABSOLUTE: 2.1 K/UL (ref 1–5.1)
LYMPHOCYTES RELATIVE PERCENT: 37.7 %
MCH RBC QN AUTO: 30.8 PG (ref 26–34)
MCHC RBC AUTO-ENTMCNC: 33.5 G/DL (ref 31–36)
MCV RBC AUTO: 91.9 FL (ref 80–100)
MONOCYTES ABSOLUTE: 0.4 K/UL (ref 0–1.3)
MONOCYTES RELATIVE PERCENT: 7.9 %
NEUTROPHILS ABSOLUTE: 2.8 K/UL (ref 1.7–7.7)
NEUTROPHILS RELATIVE PERCENT: 51.7 %
PDW BLD-RTO: 13.1 % (ref 12.4–15.4)
PLATELET # BLD: 286 K/UL (ref 135–450)
PMV BLD AUTO: 8.6 FL (ref 5–10.5)
RBC # BLD: 4.58 M/UL (ref 4.2–5.9)
WBC # BLD: 5.5 K/UL (ref 4–11)

## 2021-11-23 PROCEDURE — 99396 PREV VISIT EST AGE 40-64: CPT | Performed by: NURSE PRACTITIONER

## 2021-11-23 SDOH — ECONOMIC STABILITY: FOOD INSECURITY: WITHIN THE PAST 12 MONTHS, YOU WORRIED THAT YOUR FOOD WOULD RUN OUT BEFORE YOU GOT MONEY TO BUY MORE.: NEVER TRUE

## 2021-11-23 SDOH — ECONOMIC STABILITY: TRANSPORTATION INSECURITY
IN THE PAST 12 MONTHS, HAS THE LACK OF TRANSPORTATION KEPT YOU FROM MEDICAL APPOINTMENTS OR FROM GETTING MEDICATIONS?: NO

## 2021-11-23 SDOH — ECONOMIC STABILITY: INCOME INSECURITY: IN THE LAST 12 MONTHS, WAS THERE A TIME WHEN YOU WERE NOT ABLE TO PAY THE MORTGAGE OR RENT ON TIME?: NO

## 2021-11-23 SDOH — ECONOMIC STABILITY: TRANSPORTATION INSECURITY
IN THE PAST 12 MONTHS, HAS LACK OF TRANSPORTATION KEPT YOU FROM MEETINGS, WORK, OR FROM GETTING THINGS NEEDED FOR DAILY LIVING?: NO

## 2021-11-23 SDOH — ECONOMIC STABILITY: HOUSING INSECURITY
IN THE LAST 12 MONTHS, WAS THERE A TIME WHEN YOU DID NOT HAVE A STEADY PLACE TO SLEEP OR SLEPT IN A SHELTER (INCLUDING NOW)?: NO

## 2021-11-23 SDOH — ECONOMIC STABILITY: FOOD INSECURITY: WITHIN THE PAST 12 MONTHS, THE FOOD YOU BOUGHT JUST DIDN'T LAST AND YOU DIDN'T HAVE MONEY TO GET MORE.: NEVER TRUE

## 2021-11-23 ASSESSMENT — PATIENT HEALTH QUESTIONNAIRE - PHQ9
SUM OF ALL RESPONSES TO PHQ9 QUESTIONS 1 & 2: 0
SUM OF ALL RESPONSES TO PHQ QUESTIONS 1-9: 0
1. LITTLE INTEREST OR PLEASURE IN DOING THINGS: 0
2. FEELING DOWN, DEPRESSED OR HOPELESS: 0
SUM OF ALL RESPONSES TO PHQ QUESTIONS 1-9: 0
SUM OF ALL RESPONSES TO PHQ QUESTIONS 1-9: 0
1. LITTLE INTEREST OR PLEASURE IN DOING THINGS: 0
SUM OF ALL RESPONSES TO PHQ QUESTIONS 1-9: 0

## 2021-11-23 ASSESSMENT — ENCOUNTER SYMPTOMS
RESPIRATORY NEGATIVE: 1
EYES NEGATIVE: 1
GASTROINTESTINAL NEGATIVE: 1

## 2021-11-23 ASSESSMENT — SOCIAL DETERMINANTS OF HEALTH (SDOH): HOW HARD IS IT FOR YOU TO PAY FOR THE VERY BASICS LIKE FOOD, HOUSING, MEDICAL CARE, AND HEATING?: NOT HARD AT ALL

## 2021-11-23 NOTE — PATIENT INSTRUCTIONS
Patient Education        Well Visit, Men 48 to 72: Care Instructions  Overview     Well visits can help you stay healthy. Your doctor has checked your overall health and may have suggested ways to take good care of yourself. Your doctor also may have recommended tests. At home, you can help prevent illness with healthy eating, regular exercise, and other steps. Follow-up care is a key part of your treatment and safety. Be sure to make and go to all appointments, and call your doctor if you are having problems. It's also a good idea to know your test results and keep a list of the medicines you take. How can you care for yourself at home? · Get screening tests that you and your doctor decide on. Screening helps find diseases before any symptoms appear. · Eat healthy foods. Choose fruits, vegetables, whole grains, protein, and low-fat dairy foods. Limit fat, especially saturated fat. Reduce salt in your diet. · Limit alcohol. Have no more than 2 drinks a day or 14 drinks a week. · Get at least 30 minutes of exercise on most days of the week. Walking is a good choice. You also may want to do other activities, such as running, swimming, cycling, or playing tennis or team sports. · Reach and stay at a healthy weight. This will lower your risk for many problems, such as obesity, diabetes, heart disease, and high blood pressure. · Do not smoke. Smoking can make health problems worse. If you need help quitting, talk to your doctor about stop-smoking programs and medicines. These can increase your chances of quitting for good. · Care for your mental health. It is easy to get weighed down by worry and stress. Learn strategies to manage stress, like deep breathing and mindfulness, and stay connected with your family and community. If you find you often feel sad or hopeless, talk with your doctor. Treatment can help.   · Talk to your doctor about whether you have any risk factors for sexually transmitted infections (STIs). You can help prevent STIs if you wait to have sex with a new partner (or partners) until you've each been tested for STIs. It also helps if you use condoms (male or female condoms) and if you limit your sex partners to one person who only has sex with you. Vaccines are available for some STIs. · If it's important to you to prevent pregnancy with your partner, talk with your doctor about birth control options that might be best for you. · If you think you may have a problem with alcohol or drug use, talk to your doctor. This includes prescription medicines (such as amphetamines and opioids) and illegal drugs (such as cocaine and methamphetamine). Your doctor can help you figure out what type of treatment is best for you. · Protect your skin from too much sun. When you're outdoors from 10 a.m. to 4 p.m., stay in the shade or cover up with clothing and a hat with a wide brim. Wear sunglasses that block UV rays. Even when it's cloudy, put broad-spectrum sunscreen (SPF 30 or higher) on any exposed skin. · See a dentist one or two times a year for checkups and to have your teeth cleaned. · Wear a seat belt in the car. When should you call for help? Watch closely for changes in your health, and be sure to contact your doctor if you have any problems or symptoms that concern you. Where can you learn more? Go to https://Democravisepesussy.health-partners. org and sign in to your Fitness Interactive Experience account. Enter V749 in the [facility] box to learn more about \"Well Visit, Men 48 to 72: Care Instructions. \"     If you do not have an account, please click on the \"Sign Up Now\" link. Current as of: February 11, 2021               Content Version: 13.0  © 7598-1775 Healthwise, Incorporated. Care instructions adapted under license by [facility] ([facility]).  If you have questions about a medical condition or this instruction, always ask your healthcare professional. Matheus Haynes disclaims any warranty or liability for your use of this information. Patient Education        Diverticulosis: Care Instructions  Your Care Instructions  In diverticulosis, pouches called diverticula form in the wall of the large intestine (colon). The pouches do not cause any pain or other symptoms. Most people who have diverticulosis do not know they have it. But the pouches sometimes bleed, and if they become infected, they can cause pain and other symptoms. When this happens, it is called diverticulitis. Diverticula form when pressure pushes the wall of the colon outward at certain weak points. A diet that is too low in fiber can cause diverticula. Follow-up care is a key part of your treatment and safety. Be sure to make and go to all appointments, and call your doctor if you are having problems. It's also a good idea to know your test results and keep a list of the medicines you take. How can you care for yourself at home? · Include fruits, leafy green vegetables, beans, and whole grains in your diet each day. These foods are high in fiber. · Take a fiber supplement, such as Citrucel or Metamucil, every day if needed. Read and follow all instructions on the label. · Drink plenty of fluids. If you have kidney, heart, or liver disease and have to limit fluids, talk with your doctor before you increase the amount of fluids you drink. · Get at least 30 minutes of exercise on most days of the week. Walking is a good choice. You also may want to do other activities, such as running, swimming, cycling, or playing tennis or team sports. · Cut out foods that cause gas, pain, or other symptoms. When should you call for help?    Call your doctor now or seek immediate medical care if:    · You have belly pain.     · You pass maroon or very bloody stools.     · You have a fever.     · You have nausea and vomiting.     · You have unusual changes in your bowel movements or abdominal swelling.     · You have burning pain when you urinate.     · You have abnormal vaginal discharge.     · You have shoulder pain.     · You have cramping pain that does not get better when you have a bowel movement or pass gas.     · You pass gas or stool from your urethra while urinating. Watch closely for changes in your health, and be sure to contact your doctor if you have any problems. Where can you learn more? Go to https://Collider Mediapepiceweb.BBspace. org and sign in to your [employer]obotics account. Enter C218 in the [employer] box to learn more about \"Diverticulosis: Care Instructions. \"     If you do not have an account, please click on the \"Sign Up Now\" link. Current as of: February 10, 2021               Content Version: 13.0  © 2006-2021 Healthwise, Incorporated. Care instructions adapted under license by [facility] ([facility]). If you have questions about a medical condition or this instruction, always ask your healthcare professional. [address] any warranty or liability for your use of this information.

## 2021-11-23 NOTE — PROGRESS NOTES
2021    Nam Pulliam (:  1965) is a 61 y.o. male, here for a preventive medicine evaluation. Pt is here today for his annual work physical.  He is fasting for labs. Declines flu vaccine. Colonoscopy done 2018 - diverticulosis. Repeat in 10 years. 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 HENT: Negative. Eyes: Negative. Respiratory: Negative. Cardiovascular: Negative. Gastrointestinal: Negative. Genitourinary: Negative. Musculoskeletal: Negative. Skin: Negative. Neurological: Negative. Psychiatric/Behavioral: Negative.         Prior to Visit Medications    Not on File        Allergies   Allergen Reactions    Seasonal        Past Medical History:   Diagnosis Date    Acute left-sided low back pain with left-sided sciatica 2018    Allergic rhinitis     Degenerative tear of medial meniscus of right knee 2019    No history of previous surgery 2018    colonoscopy       Past Surgical History:   Procedure Laterality Date    COLONOSCOPY  2018    diverticulosis    KNEE ARTHROSCOPY Right 2019    RIGHT KNEE VIDEO ARTHROSCOPY, INTERNAL FIXATION MEDIAL TIBIAL PLATEAU, MEDIAL FEMORAL CONDYLE INSUFFICIENCY FRACTURE WITH BONE SUBSTITUTE   performed by Wendi Richardson MD at [address] [place] [address] Marital status:      Spouse name: Not on file    Number of children: Not on file    Years of education: Not on file    Highest education level: Not on file   Occupational History    Not on file   Tobacco Use    Smoking status: Never Smoker    Smokeless tobacco: Never Used   Vaping Use    Vaping Use: Never used   Substance and Sexual Activity    Alcohol use: Yes     Alcohol/week: 1.0 standard drink     Types: 1 Cans of beer per week    Drug use: No    Sexual activity: Not on file   Other Topics Concern    Not on file   Social History Narrative    Not on file     Social Determinants of Health     Financial Resource Strain: Low Risk     Difficulty of Paying Living Expenses: Not hard at all   Food Insecurity: No Food Insecurity    Worried About Running Out of Food in the Last Year: Never true    [address] in the Last Year: Never true   Transportation Needs: No Transportation Needs    Lack of Transportation (Medical): No    Lack of Transportation (Non-Medical):  No   Physical Activity:     Days of Exercise per Week: Not on file    Minutes of Exercise per Session: Not on file   Stress:     Feeling of Stress : Not on file   Social Connections:     Frequency of Communication with Friends and Family: Not on file    Frequency of Social Gatherings with Friends and Family: Not on file    Attends [employer] Services: Not on file    Active Member of [address] [place] [address] [employer] or Organizations: Not on file    Attends Club or Organization Meetings: Not on file    Marital Status: Not on file   Intimate Partner Violence:     Fear of Current or Ex-Partner: Not on file    Emotionally Abused: Not on file    Physically Abused: Not on file    Sexually Abused: Not on file   Housing Stability: Unknown    Unable to Pay for Housing in the Last Year: No    Number of [place] in the Last Year: Not on file    Unstable Housing in the Last Year: No        Family History   Problem Relation Age of Onset    Diabetes Father     Heart Disease Father        ADVANCE DIRECTIVE: N, <no information>    Vitals:    11/23/21 1419   BP: 112/74   Site: Left Upper Arm   Position: Sitting   Cuff Size: Large Adult   Pulse: 78   SpO2: 96%   Weight: 273 lb 12.8 oz (124.2 kg)   Height: 6' (1.829 m)     Estimated body mass index is 37.13 kg/m² as calculated from the following:    Height as of this encounter: 6' (1.829 m). Weight as of this encounter: 273 lb 12.8 oz (124.2 kg). Physical Exam  Vitals reviewed. Constitutional:       Appearance: Normal appearance. HENT:      Head: Normocephalic. Cardiovascular:      Rate and Rhythm: Normal rate and regular rhythm. Heart sounds: Normal heart sounds. Pulmonary:      Effort: Pulmonary effort is normal.      Breath sounds: Normal breath sounds. Musculoskeletal:      Cervical back: Normal range of motion. Skin:     General: Skin is warm and dry. Neurological:      Mental Status: He is alert and oriented to person, place, and time. Psychiatric:         Mood and Affect: Mood normal.         Behavior: Behavior normal.         Thought Content: Thought content normal.         Judgment: Judgment normal.         No flowsheet data found. Lab Results   Component Value Date    CHOL 168 08/19/2020    CHOL 180 05/26/2018    CHOL 215 12/03/2014    TRIG 111 08/19/2020    TRIG 97 05/26/2018    TRIG 140 12/03/2014    HDL 58 08/19/2020    HDL 49 05/26/2018    HDL 71 12/03/2014    HDL 46 04/23/2010    LDLCALC 88 08/19/2020    LDLCALC 112 05/26/2018    LDLCALC 116 12/03/2014    GLUCOSE 105 08/19/2020       The 10-year ASCVD risk score (Jyoti Lu et al., 2013) is: 3.9%    Values used to calculate the score:      Age: 61 years      Sex: Male      Is Non- : No      Diabetic: No      Tobacco smoker: No      Systolic Blood Pressure: 805 mmHg      Is BP treated: No      HDL Cholesterol: 58 mg/dL      Total Cholesterol: 168 mg/dL      There is no immunization history on file for this patient.     Health Maintenance   Topic Date Due    Hepatitis C screen  Never done    COVID-19 Vaccine (1) Never done   [employer] HIV screen  Never done    DTaP/Tdap/Td vaccine (1 - Tdap) Never done    Shingles Vaccine (1 of 2) Never done    Flu vaccine (1) Never done    Lipid screen  08/19/2025    Colon cancer screen colonoscopy  06/08/2028    Hepatitis A vaccine  Aged Out    Hepatitis B vaccine  Aged Out    Hib vaccine  Aged Out    Meningococcal (ACWY) vaccine  Aged Out    Pneumococcal 0-64 years Vaccine  Aged Out          ASSESSMENT/PLAN:    1. Encounter for well adult exam without abnormal findings  Encouraged healthy diet and exercise. Recommended routine dental and vision exams. Declines flu vaccine. -     CBC Auto Differential  -     Comprehensive Metabolic Panel w/ Reflex to MG  -     Lipid Panel  -     T4, Free  -     TSH without Reflex  -     PSA screening    2. Diverticulosis of large intestine without hemorrhage  Encouraged pt to follow a high fiber diet. Return in about 1 year (around 11/23/2022) for Annual Physical.       An electronic signature was used to authenticate this note.     --PAVEL Macdonald - CNP on 11/23/2021 at 3:36 PM

## 2021-11-24 LAB
A/G RATIO: 2 (ref 1.1–2.2)
ALBUMIN SERPL-MCNC: 4.9 G/DL (ref 3.4–5)
ALP BLD-CCNC: 75 U/L (ref 40–129)
ALT SERPL-CCNC: 32 U/L (ref 10–40)
ANION GAP SERPL CALCULATED.3IONS-SCNC: 15 MMOL/L (ref 3–16)
AST SERPL-CCNC: 34 U/L (ref 15–37)
BILIRUB SERPL-MCNC: 0.6 MG/DL (ref 0–1)
BUN BLDV-MCNC: 17 MG/DL (ref 7–20)
CALCIUM SERPL-MCNC: 10 MG/DL (ref 8.3–10.6)
CHLORIDE BLD-SCNC: 100 MMOL/L (ref 99–110)
CHOLESTEROL, TOTAL: 184 MG/DL (ref 0–199)
CO2: 25 MMOL/L (ref 21–32)
CREAT SERPL-MCNC: 0.8 MG/DL (ref 0.9–1.3)
GFR AFRICAN AMERICAN: >60
GFR NON-AFRICAN AMERICAN: >60
GLUCOSE BLD-MCNC: 82 MG/DL (ref 70–99)
HDLC SERPL-MCNC: 64 MG/DL (ref 40–60)
LDL CHOLESTEROL CALCULATED: 98 MG/DL
POTASSIUM REFLEX MAGNESIUM: 4.3 MMOL/L (ref 3.5–5.1)
PROSTATE SPECIFIC ANTIGEN: 1.67 NG/ML (ref 0–4)
SODIUM BLD-SCNC: 140 MMOL/L (ref 136–145)
T4 FREE: 1 NG/DL (ref 0.9–1.8)
TOTAL PROTEIN: 7.3 G/DL (ref 6.4–8.2)
TRIGL SERPL-MCNC: 109 MG/DL (ref 0–150)
TSH SERPL DL<=0.05 MIU/L-ACNC: 2.02 UIU/ML (ref 0.27–4.2)
VLDLC SERPL CALC-MCNC: 22 MG/DL

## 2022-02-05 ENCOUNTER — APPOINTMENT (OUTPATIENT)
Dept: GENERAL RADIOLOGY | Age: 57
End: 2022-02-05
Payer: COMMERCIAL

## 2022-02-05 ENCOUNTER — HOSPITAL ENCOUNTER (EMERGENCY)
Age: 57
Discharge: HOME | End: 2022-02-06
Attending: EMERGENCY MEDICINE
Payer: COMMERCIAL
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
## 2022-02-05 DIAGNOSIS — S43.015A ANTERIOR DISLOCATION OF LEFT SHOULDER, INITIAL ENCOUNTER: ICD-10-CM

## 2022-02-05 DIAGNOSIS — S42.252A CLOSED DISPLACED FRACTURE OF GREATER TUBEROSITY OF LEFT HUMERUS, INITIAL ENCOUNTER: Primary | ICD-10-CM

## 2022-02-05 PROCEDURE — 2500000003 HC RX 250 WO HCPCS: Performed by: EMERGENCY MEDICINE

## 2022-02-05 PROCEDURE — 99285 EMERGENCY DEPT VISIT HI MDM: CPT

## 2022-02-05 PROCEDURE — 73030 X-RAY EXAM OF SHOULDER: CPT

## 2022-02-05 PROCEDURE — 96376 TX/PRO/DX INJ SAME DRUG ADON: CPT

## 2022-02-05 PROCEDURE — 96375 TX/PRO/DX INJ NEW DRUG ADDON: CPT

## 2022-02-05 PROCEDURE — 6360000002 HC RX W HCPCS: Performed by: EMERGENCY MEDICINE

## 2022-02-05 PROCEDURE — 96365 THER/PROPH/DIAG IV INF INIT: CPT

## 2022-02-05 RX ORDER — KETAMINE HYDROCHLORIDE 100 MG/ML
0.5 INJECTION, SOLUTION INTRAMUSCULAR; INTRAVENOUS ONCE
Status: COMPLETED | OUTPATIENT
Start: 2022-02-05 | End: 2022-02-05

## 2022-02-05 RX ORDER — PROPOFOL 10 MG/ML
200 INJECTION, EMULSION INTRAVENOUS ONCE
Status: COMPLETED | OUTPATIENT
Start: 2022-02-05 | End: 2022-02-06

## 2022-02-05 RX ADMIN — KETAMINE HYDROCHLORIDE 40 MG: 100 INJECTION, SOLUTION, CONCENTRATE INTRAMUSCULAR; INTRAVENOUS at 23:59

## 2022-02-05 RX ADMIN — PROPOFOL INJECTABLE EMULSION 60 MG: 10 INJECTION, EMULSION INTRAVENOUS at 23:55

## 2022-02-05 RX ADMIN — KETAMINE HYDROCHLORIDE 60 MG: 100 INJECTION, SOLUTION, CONCENTRATE INTRAMUSCULAR; INTRAVENOUS at 23:54

## 2022-02-05 ASSESSMENT — PAIN DESCRIPTION - PAIN TYPE: TYPE: ACUTE PAIN

## 2022-02-05 ASSESSMENT — PAIN SCALES - GENERAL
PAINLEVEL_OUTOF10: 1
PAINLEVEL_OUTOF10: 1

## 2022-02-05 ASSESSMENT — PAIN DESCRIPTION - LOCATION: LOCATION: SHOULDER

## 2022-02-05 ASSESSMENT — PAIN DESCRIPTION - ORIENTATION: ORIENTATION: LEFT

## 2022-02-06 ENCOUNTER — APPOINTMENT (OUTPATIENT)
Dept: GENERAL RADIOLOGY | Age: 57
End: 2022-02-06
Payer: COMMERCIAL

## 2022-02-06 ENCOUNTER — APPOINTMENT (OUTPATIENT)
Dept: CT IMAGING | Age: 57
End: 2022-02-06
Payer: COMMERCIAL

## 2022-02-06 VITALS
SYSTOLIC BLOOD PRESSURE: 118 MMHG | RESPIRATION RATE: 14 BRPM | TEMPERATURE: 97.6 F | BODY MASS INDEX: 37.93 KG/M2 | OXYGEN SATURATION: 99 % | HEART RATE: 85 BPM | HEIGHT: 72 IN | WEIGHT: 280 LBS | DIASTOLIC BLOOD PRESSURE: 89 MMHG

## 2022-02-06 PROCEDURE — 6360000002 HC RX W HCPCS: Performed by: EMERGENCY MEDICINE

## 2022-02-06 PROCEDURE — 73200 CT UPPER EXTREMITY W/O DYE: CPT

## 2022-02-06 PROCEDURE — 73030 X-RAY EXAM OF SHOULDER: CPT

## 2022-02-06 RX ORDER — PROPOFOL 10 MG/ML
INJECTION, EMULSION INTRAVENOUS CONTINUOUS PRN
Status: COMPLETED | OUTPATIENT
Start: 2022-02-06 | End: 2022-02-06

## 2022-02-06 RX ORDER — OXYCODONE HYDROCHLORIDE AND ACETAMINOPHEN 5; 325 MG/1; MG/1
1-2 TABLET ORAL EVERY 4 HOURS PRN
Qty: 19 TABLET | Refills: 0 | Status: SHIPPED | OUTPATIENT
Start: 2022-02-06 | End: 2022-02-11

## 2022-02-06 RX ORDER — KETAMINE HYDROCHLORIDE 100 MG/ML
INJECTION, SOLUTION INTRAMUSCULAR; INTRAVENOUS DAILY PRN
Status: COMPLETED | OUTPATIENT
Start: 2022-02-05 | End: 2022-02-05

## 2022-02-06 RX ADMIN — PROPOFOL 20 MG: 10 INJECTION, EMULSION INTRAVENOUS at 00:00

## 2022-02-06 NOTE — PROGRESS NOTES
This RT present for conscious sedation, EtCO2, SpO2 normal through out procedure.   Cecelia Pierre RRT

## 2022-02-06 NOTE — ED PROVIDER NOTES
Heart Disease Father        Social History     Socioeconomic History    Marital status:      Spouse name: Not on file    Number of children: Not on file    Years of education: Not on file    Highest education level: Not on file   Occupational History    Not on file   Tobacco Use    Smoking status: Never Smoker    Smokeless tobacco: Never Used   Vaping Use    Vaping Use: Never used   Substance and Sexual Activity    Alcohol use: Yes     Alcohol/week: 1.0 standard drink     Types: 1 Cans of beer per week    Drug use: No    Sexual activity: Not on file   Other Topics Concern    Not on file   Social History Narrative    Not on file     Social Determinants of Health     Financial Resource Strain: Low Risk     Difficulty of Paying Living Expenses: Not hard at all   Food Insecurity: No Food Insecurity    Worried About Running Out of Food in the Last Year: Never true    [address] in the Last Year: Never true   Transportation Needs: No Transportation Needs    Lack of Transportation (Medical): No    Lack of Transportation (Non-Medical):  No   Physical Activity:     Days of Exercise per Week: Not on file    Minutes of Exercise per Session: Not on file   Stress:     Feeling of Stress : Not on file   Social Connections:     Frequency of Communication with Friends and Family: Not on file    Frequency of Social Gatherings with Friends and Family: Not on file    Attends Yazdanism Services: Not on file    Active Member of Clubs or Organizations: Not on file    Attends Club or Organization Meetings: Not on file    Marital Status: Not on file   Intimate Partner Violence:     Fear of Current or Ex-Partner: Not on file    Emotionally Abused: Not on file    Physically Abused: Not on file    Sexually Abused: Not on file   Housing Stability: Unknown    Unable to Pay for Housing in the Last Year: No    Number of [place] in the Last Year: Not on file    Unstable Housing in the Last Year: No Nursing notes reviewed. ED Triage Vitals [02/05/22 2238]   Enc Vitals Group      BP (!) 142/85      Pulse 73      Resp 15      Temp 97.6 °F (36.4 °C)      Temp Source Temporal      SpO2 99 %      Weight 280 lb (127 kg)      Height 6' (1.829 m)      Head Circumference       Peak Flow       Pain Score       Pain Loc       Pain Edu? Excl. in [address]? GENERAL:  Awake, alert. Well developed, well nourished with no apparent distress. HENT:  Normocephalic, left-sided forehead and maxillary prominence abrasions, no hematoma or depressed skull fracture, moist mucous membranes. EYES:  Pupils equal round and reactive to light, Conjunctiva normal, extraocular movements normal.  NECK:  No meningeal signs, Supple. No tenderness. CHEST:  Regular rate and rhythm, chest wall non-tender. LUNGS:  Clear to auscultation bilaterally. ABDOMEN:  Soft, non-tender, no rebound, rigidity or guarding, non-distended, normal bowel sounds. No costovertebral angle tenderness to palpation. BACK:  No tenderness. No step-offs, contusions or abrasions throughout the cervical, thoracic or lumbar spine. EXTREMITIES: Left shoulder is tender and deformed with obvious anterior shoulder dislocation, no other tenderness in left upper extremity, no deformity, distal pulses present. Neurovascular intact to the fingertips of left hand. Remainder of axial and appendicular skeleton NT exc as noted. Full active ROM as well at all jts exc as noted. SKIN: Warm, dry and intact. NEUROLOGIC: Normal mental status. Moving all extremities to command. RADIOLOGY  X-RAYS:  I have reviewed radiologic plain film image(s). ALL OTHER NON-PLAIN FILM IMAGES SUCH AS CT, ULTRASOUND AND MRI HAVE BEEN READ BY THE RADIOLOGIST. CT SHOULDER LEFT WO CONTRAST   Preliminary Result   Acute traumatic comminuted fracture of the greater tuberosity of the proximal   left humerus. Small cortical fracture of the anterior inferior rim of the glenoid. Small glenohumeral hemarthrosis with findings consistent with recent   glenohumeral dislocation. XR SHOULDER LEFT (MIN 2 VIEWS)   Final Result   Status post reduction previously seen glenohumeral dislocation. Displaced large greater tuberosity fracture fragments. XR SHOULDER LEFT (MIN 2 VIEWS)   Final Result   Acute anterior shoulder dislocation and humeral head fracture. PROCEDURES  PROCEDURE:  PROCEDURAL SEDATION  Pre-sedation Assessment  Intended Procedure: Shoulder reduction  Pre-Procedure Assessment/Plan:  Airway:Mallampati class II (soft palate, uvula, fauces visible)  ASA 1 - Normal health patient  Level of Sedation Plan: Moderate sedation  Post Procedure plan: Return to same level of care  I assessed the patient and find that the patient is in satisfactory condition to proceed with the planned procedure and sedation plan. The benefits, risks, and alternatives of procedural sedation were discussed with Nam GRUBBS Walker or their surrogate. We discussed the potential side effects or adverse reactions that could occur with ketamine and propofol. An opportunity to ask questions was provided, and consent was given for the procedure. Oxygen was administered, and the appropriate pre-procedural policies were followed. Hospital protocol for cardiac, oxygen saturation, and blood pressure monitoring occurred. For details of the dosage administered, see the procedural sedation documentation. Nam GRUBBS Walker tolerated the medication and procedure without complications. Nam GRUBBS Walker woke up without difficulty, and was sent home with someone to evaluate them during the post-sedation period. Intra-service time of 10 minutes. PROCEDURE:  JOINT REDUCTION  The benefits, risks, and alternatives of shoulder reduction were discussed with Nam GRUBBS Walker or their surrogate. An opportunity to ask questions was provided, and consent was given for the procedure.  Once adequately anesthetized, the shoulder was easily reduced using adduction, external rotation and traction following successful reduction, immobilization was performed and the extremity's neurovascular status was checked and it was intact. The patient tolerated the procedure without complications. MEDICAL DECISION MAKING        Attempted relaxation maneuvers and other reduction techniques unsuccessfully without sedation. We had a discussion regarding the risks, benefits alternatives of sedation patient agreed to be sedated for the procedure. Procedure was successful in reducing the glenohumeral dislocation. After this I consulted with Dr. Tita Davis, orthopedist, and he recommended CT of the shoulder and that the patient could follow-up in the office this coming week. Patient is agreeable to plan  I advised the patient to return to the emergency department immediately for any new or worsening symptoms, such as increased pain or swelling. The patient voiced agreement and understanding of the treatment plan. No results found for this visit on 02/05/22. I estimate there is LOW risk for COMPARTMENT SYNDROME, DEEP VENOUS THROMBOSIS, SEPTIC ARTHRITIS, TENDON OR NEUROVASCULAR INJURY, thus I consider the discharge disposition reasonable. Nam Pulliam and I have discussed the diagnosis and risks, and we agree with discharging home to follow-up with their primary doctor or the referral orthopedist. We also discussed returning to the Emergency Department immediately if new or worsening symptoms occur. We have discussed the symptoms which are most concerning (e.g., changing or worsening pain, numbness, weakness) that necessitate immediate return. Final Impression    1. Closed displaced fracture of greater tuberosity of left humerus, initial encounter    2. Fall down stairs, initial encounter    3.  Anterior dislocation of left shoulder, initial encounter        Blood pressure 118/89, pulse 85, temperature 97.6 °F (36.4 °C), temperature source Temporal, resp. rate 14, height 6' (1.829 m), weight 280 lb (127 kg), SpO2 99 %. Patient was given scripts for the following medications. I counseled patient how to take these medications. Discharge Medication List as of 2/6/2022  1:12 AM      START taking these medications    Details   oxyCODONE-acetaminophen (PERCOCET) 5-325 MG per tablet Take 1-2 tablets by mouth every 4 hours as needed for Pain for up to 5 days. , Disp-19 tablet, R-0Normal             Disposition  Pt is in good condition upon Discharge to home. This chart was generated using the [address] [place], [place] [address]Th  dictation system. I created this record but it may contain dictation errors.           Man Rios MD  02/06/22 5507

## 2022-02-07 ENCOUNTER — HOSPITAL ENCOUNTER (OUTPATIENT)
Age: 57
Discharge: HOME | End: 2022-02-07
Payer: COMMERCIAL

## 2022-02-07 ENCOUNTER — TELEPHONE (OUTPATIENT)
Dept: FAMILY MEDICINE CLINIC | Age: 57
End: 2022-02-07

## 2022-02-07 ENCOUNTER — TELEPHONE (OUTPATIENT)
Dept: ORTHOPEDIC SURGERY | Age: 57
End: 2022-02-07

## 2022-02-07 ENCOUNTER — OFFICE VISIT (OUTPATIENT)
Dept: ORTHOPEDIC SURGERY | Age: 57
End: 2022-02-07
Payer: COMMERCIAL

## 2022-02-07 VITALS — HEIGHT: 72 IN | WEIGHT: 280 LBS | BODY MASS INDEX: 37.93 KG/M2

## 2022-02-07 DIAGNOSIS — S43.005A SHOULDER DISLOCATION, LEFT, INITIAL ENCOUNTER: ICD-10-CM

## 2022-02-07 DIAGNOSIS — S42.252A CLOSED DISPLACED FRACTURE OF GREATER TUBEROSITY OF LEFT HUMERUS, INITIAL ENCOUNTER: Primary | ICD-10-CM

## 2022-02-07 DIAGNOSIS — S42.142A: ICD-10-CM

## 2022-02-07 LAB
ABO/RH: NORMAL
ALBUMIN SERPL-MCNC: 3.9 G/DL (ref 3.4–5)
ANION GAP SERPL CALCULATED.3IONS-SCNC: 13 MMOL/L (ref 3–16)
ANTIBODY SCREEN: NORMAL
APTT: 31.5 SEC (ref 26.2–38.6)
BASOPHILS ABSOLUTE: 0 K/UL (ref 0–0.2)
BASOPHILS RELATIVE PERCENT: 0.5 %
BILIRUBIN URINE: NEGATIVE
BLOOD, URINE: NEGATIVE
BUN BLDV-MCNC: 20 MG/DL (ref 7–20)
CALCIUM SERPL-MCNC: 9 MG/DL (ref 8.3–10.6)
CHLORIDE BLD-SCNC: 101 MMOL/L (ref 99–110)
CLARITY: CLEAR
CO2: 27 MMOL/L (ref 21–32)
COLOR: NORMAL
CREAT SERPL-MCNC: 0.8 MG/DL (ref 0.9–1.3)
EOSINOPHILS ABSOLUTE: 0.1 K/UL (ref 0–0.6)
EOSINOPHILS RELATIVE PERCENT: 1 %
GFR AFRICAN AMERICAN: >60
GFR NON-AFRICAN AMERICAN: >60
GLUCOSE BLD-MCNC: 89 MG/DL (ref 70–99)
GLUCOSE URINE: NEGATIVE MG/DL
HCT VFR BLD CALC: 39.6 % (ref 40.5–52.5)
HEMOGLOBIN: 13.5 G/DL (ref 13.5–17.5)
INR BLD: 0.91 (ref 0.88–1.12)
KETONES, URINE: NEGATIVE MG/DL
LEUKOCYTE ESTERASE, URINE: NEGATIVE
LYMPHOCYTES ABSOLUTE: 1.8 K/UL (ref 1–5.1)
LYMPHOCYTES RELATIVE PERCENT: 25 %
MCH RBC QN AUTO: 30.9 PG (ref 26–34)
MCHC RBC AUTO-ENTMCNC: 34 G/DL (ref 31–36)
MCV RBC AUTO: 91 FL (ref 80–100)
MICROSCOPIC EXAMINATION: NORMAL
MONOCYTES ABSOLUTE: 0.7 K/UL (ref 0–1.3)
MONOCYTES RELATIVE PERCENT: 9.5 %
NEUTROPHILS ABSOLUTE: 4.6 K/UL (ref 1.7–7.7)
NEUTROPHILS RELATIVE PERCENT: 64 %
NITRITE, URINE: NEGATIVE
PDW BLD-RTO: 13.7 % (ref 12.4–15.4)
PH UA: 6 (ref 5–8)
PLATELET # BLD: 297 K/UL (ref 135–450)
PMV BLD AUTO: 8.2 FL (ref 5–10.5)
POTASSIUM SERPL-SCNC: 3.9 MMOL/L (ref 3.5–5.1)
PROTEIN UA: NEGATIVE MG/DL
PROTHROMBIN TIME: 10.2 SEC (ref 9.9–12.7)
RBC # BLD: 4.35 M/UL (ref 4.2–5.9)
SODIUM BLD-SCNC: 141 MMOL/L (ref 136–145)
SPECIFIC GRAVITY UA: 1.02 (ref 1–1.03)
TRANSFERRIN: 251 MG/DL (ref 200–360)
URINE TYPE: NORMAL
UROBILINOGEN, URINE: 0.2 E.U./DL
WBC # BLD: 7.2 K/UL (ref 4–11)

## 2022-02-07 PROCEDURE — 80048 BASIC METABOLIC PNL TOTAL CA: CPT

## 2022-02-07 PROCEDURE — 85730 THROMBOPLASTIN TIME PARTIAL: CPT

## 2022-02-07 PROCEDURE — 85610 PROTHROMBIN TIME: CPT

## 2022-02-07 PROCEDURE — 84466 ASSAY OF TRANSFERRIN: CPT

## 2022-02-07 PROCEDURE — 87641 MR-STAPH DNA AMP PROBE: CPT

## 2022-02-07 PROCEDURE — 36415 COLL VENOUS BLD VENIPUNCTURE: CPT

## 2022-02-07 PROCEDURE — 85025 COMPLETE CBC W/AUTO DIFF WBC: CPT

## 2022-02-07 PROCEDURE — 99214 OFFICE O/P EST MOD 30 MIN: CPT | Performed by: PHYSICIAN ASSISTANT

## 2022-02-07 PROCEDURE — 87086 URINE CULTURE/COLONY COUNT: CPT

## 2022-02-07 PROCEDURE — 83036 HEMOGLOBIN GLYCOSYLATED A1C: CPT

## 2022-02-07 PROCEDURE — 82040 ASSAY OF SERUM ALBUMIN: CPT

## 2022-02-07 PROCEDURE — 86901 BLOOD TYPING SEROLOGIC RH(D): CPT

## 2022-02-07 PROCEDURE — 93005 ELECTROCARDIOGRAM TRACING: CPT | Performed by: ORTHOPAEDIC SURGERY

## 2022-02-07 PROCEDURE — 86900 BLOOD TYPING SEROLOGIC ABO: CPT

## 2022-02-07 PROCEDURE — 86850 RBC ANTIBODY SCREEN: CPT

## 2022-02-07 PROCEDURE — 81003 URINALYSIS AUTO W/O SCOPE: CPT

## 2022-02-07 NOTE — LETTER
[facility]  Surgery Scheduling Form:    22     DEMOGRAPHICS    Patient Name:  Qing Borges  Patient :  1965   Patient SS#:  [id number]    Patient Phone:  [phone] (home)  Alt. Patient Phone:    Patient Address:  [address] [address]    PCP:  Gin Avendano DO  Insurance:  Payor: Colletta Penna / Plan: [facility] [place] PPO / Product Type: *No Product type* /   Insurance ID Number:  Payor/Plan Subscr  Sex Relation Sub. Ins. ID Effective Group Num   1. [address] A 1965 Male Self [id number]                                   [id number]       DIAGNOSIS & PROCEDURE    Diagnosis: LEFT  S4 Unspecified fracture of upper end of left humerus, initial encounter for closed fracture    Operation: LEFT Shoulder   [address] Open treatment of proximal humerus (surgical or anatomical neck) fx    Surgeon:   Rach Yuan MD    Location:  [facility] INFORMATION    Requested Date:  2022  Requested Time:  11:30 am       Patient Arrival Time:  9:30 am   OR Time Required:  90 Minutes  Admission:  [x]Outpatient   []23 hour  []Same Day Admit:     days  []Inpatient    Anesthesia:  [x]General  []Spinal  []MAC/Sedation  Regional Anesthesia:  [x]None  []Lumbar Plexus Block  []Fascia Iliaca  []Femoral  []Adductor canal  []Interscalene Block  []Insert Catheter       EQUIPMENT    Position:  []Supine  []Lateral  [x]Beach-chair  []Prone    OR Bed:  []Regular  []Mize  []Yasmani  []Hip hunter  [x]Beach-chair  [x]Spyder  Radiology:  []Large C-arm  []Small C-arm  []Portable X-ray    Implants:  [place] [facility]roscopy:  [x]Entry kit, cannulas, shaver  [x]Anchors  [x]Cobra passer                                        [x]Serfas Wand    INSTRUMENTS:  [x]Shoulder Basic tray for open biceps tenodesis    SUTURE: []#5 Ethibond  []#2 Ethibond  []#2 Quill  []#1 PDS                   [x]3-0 Vicryl  [x]3-0 Monocryl  []2-0 Nylon  []3-0 Nylon  []3-0 PDS []Dermabond  [x]Steri-strips (in half)  DRESSING:  []Prineo dermabond  [x]4x4 gauze  [x]ABDs  [x]Tegaderm  BRACE: [x]Shoulder Immob. (w/abd. pillow)  []Sling  [x]Ice Unit  []Ace-Wrap      []Nely [employer]omet:  Mac Octavio [phone], Temo Number. [email or web address]  []Medacta: Ander Rodriguez [phone], [email or web address]  []Fx Shoulder: Prudencio Sainz [phone], [email or web address]  [x]Lindon: Santos Ocampo [phone], [email or web address]  [x]Calvillo & Nephew: Cherry Shelton [phone]      Comments:        Sherie Rogers MD  [address] [facility]  2/7/2022       1:24 PM EST

## 2022-02-07 NOTE — PROGRESS NOTES
Dr Segundo Ibarra      Date /Time 2/7/2022             2:36 PM EST  Name Nam Curiel             1965   Location  [facility]  MRN <[id number]>                Chief Complaint   Patient presents with    Shoulder Pain     LEFT SHOULDER         History of Present Illness    Nam Freeman is a 61 y.o. male who presents with  left Shoulder pain. Sent in consultation by Jeferson Flores DO, . He is Right-handed. Occupation:   Occupational activities: heavy lifting. Athletic/exercise activity: no sports. Injury Mechanism:  fall. Worker's Comp. & legal issues:   none. Previous Treatments: Ice, Heat and NSAIDs    Patient presents to the office today for a new problem. Patient here with a chief complaint of left shoulder pain. Patient's left shoulder became painful on Saturday when he slipped and fell on ice. This was 2 days ago. His pain is concentrated over his lateral shoulder. He does have increased pain with any motion. He was seen in the emergency room where he was diagnosed with dislocation and fracture. Successful close reduction of the dislocation was performed. He denies any numbness and tingling peer    Past Medical History  Past Medical History:   Diagnosis Date    Acute left-sided low back pain with left-sided sciatica 6/25/2018    Allergic rhinitis     Degenerative tear of medial meniscus of right knee 5/14/2019    No history of previous surgery 06/08/2018    colonoscopy     Past Surgical History:   Procedure Laterality Date    COLONOSCOPY  06/08/2018    diverticulosis    KNEE ARTHROSCOPY Right 8/23/2019    RIGHT KNEE VIDEO ARTHROSCOPY, INTERNAL FIXATION MEDIAL TIBIAL PLATEAU, MEDIAL FEMORAL CONDYLE INSUFFICIENCY FRACTURE WITH BONE SUBSTITUTE   performed by Beryl Conner MD at [facility] History     Tobacco Use    Smoking status: Never Smoker    Smokeless tobacco: Never Used   Substance Use Topics    Alcohol use:  Yes Alcohol/week: 1.0 standard drink     Types: 1 Cans of beer per week      Current Outpatient Medications on File Prior to Visit   Medication Sig Dispense Refill    oxyCODONE-acetaminophen (PERCOCET) 5-325 MG per tablet Take 1-2 tablets by mouth every 4 hours as needed for Pain for up to 5 days. 19 tablet 0     No current facility-administered medications on file prior to visit. ASCVD 10-YEAR RISK SCORE  The 10-year ASCVD risk score (Jailene Lewis, et al., 2013) is: 4.4%    Values used to calculate the score:      Age: 61 years      Sex: Male      Is Non- : No      Diabetic: No      Tobacco smoker: No      Systolic Blood Pressure: 510 mmHg      Is BP treated: No      HDL Cholesterol: 64 mg/dL      Total Cholesterol: 184 mg/dL     Review of Systems  10-point ROS is negative other than HPI. Physical Exam  Based off 1997 Exam Criteria  Ht 6' (1.829 m)   Wt 280 lb (127 kg)   BMI 37.97 kg/m²      Constitutional:       General: He is not in acute distress. Appearance: Normal appearance. Cardiovascular:      Rate and Rhythm: Normal rate and regular rhythm. Pulses: Normal pulses. Pulmonary:      Effort: Pulmonary effort is normal. No respiratory distress. Neurological:      Mental Status: He is alert and oriented to person, place, and time. Mental status is at baseline.      Musculoskeletal:  Gait:  normal    Cervical Spine / Shoulder:      RIGHT  LEFT    Cervical Spine Exam  [x] All Neg    [x] All Neg     Spurling's  []  []Not tested   []  []Not tested    Wagoner's  []  []Not tested   []  []Not tested    Pain with rotation  []  []Not tested   []  []Not tested    Pain with lateral bending  []  []Not tested   []  []Not tested    Paraspinal muscle tenderness  [] Paraspinal  []Midline   [] Paraspinal  []Not tested    Sensation RIGHT  LEFT    Axillary  [x] Normal []Decreased    [x] Normal []Decreased   Musculocutaneous  [x] Normal  []Decreased   [x] Normal []Decreased   Median [x] Normal []Decreased   [x] Normal []Decreased   Radial  [x] Normal  []Decreased   [x] Normal []Decreased   Ulnar  [x] Normal  []Decreased   [x] Normal []Decreased   Scapula       Position  [x]Nml  []low  [] lateral  [x]Nml  []low  [] lateral   Dyskinesia  []+ []Abn. Shrug   []+ []Abn. Shrug                     Winging     [x]None   []Med  []Lat   []Worse w/FE  []Med  []Lat  []Worse w/FE   Scapulothoracic Compress.    []Impr Pain  []Impr Motion  []Impr Pain []Impr Motion    Range of Motion Active Passive Active Passive   Forward Elevation 170      Abduction 100      External Rotation @ side 60      External Rotation @ 90 abd 90      Internal Rotation @ 90 abd 40      Internal Rotation Normal      End range of motion  [] Pain  [] Pain  [] Pain  [] Pain   Strength RIGHT /5 LEFT /5   Abduction 5      External Rotation 5      Internal Rotation 5      Provocative Signs/Tests  [] All Neg   [x] +      [] -  [] All Neg   [x] +      [] -   Rotator Cuff Signs  [x] All Neg  [] Not tested   [] All Neg  [] Not tested    Neer  []  []Not tested   []  []Not tested    Soco Clark  []  []Not tested   []  []Not tested    Painful arc  []  []Not tested   []  []Not tested    Greater tuberosity tenderness  []  []Not tested   []  []Not tested    Drop arm  []  []Not tested  []  []Not tested   Superior Escape  []  []Not tested   []  []Not tested    ER Lag  []  []Not tested   []  []Not tested    Belly press  []  []Not tested   []  []Not tested    Lift-off  []  []Not tested   []  []Not tested    Bear hug  []  []Not tested   []  []Not tested    Biceps/Labral Signs  [x] All Neg  [] Not tested   [] All Neg  [] Not tested    Recinos's  []  []Not tested   []  []Not tested    Speed's  []  []Not tested   []  []Not tested    Dynamic Load Shift/Shear  []  []Not tested   []  []Not tested    Clicking/Popping  []  []Not tested  []  []Not tested   Bicipital groove tenderness  []  []Not tested   []  []Not tested    Jani  []  []Not tested   []  []Not tested    [facility] Joint Signs  [] All Neg  [] Not tested   [] All Neg  [] Not tested    [facility] [place] joint tenderness  []  []Not tested   []  []Not tested    Cross-arm adduction pain  []  []Not tested   []  []Not tested    Instability Signs  [] All Neg  [] Not tested  [] All Neg  [] Not tested   General laxity (thumb/elbow)  []  []Not tested   []  []Not tested    Hyperabduction  []  []Not tested   []  []Not tested    Sulcus []Side   []ER    []Side   []ER       Anterior apprehension  []  []Not tested   []  []Not tested    Relocation  []   []Not tested  []  []Not tested     ImagingLeft Shoulder: [address] [facility] [facility]  Radiographs: X-rays were reviewed from the emergency room. The original films do show a shoulder dislocation of the glenohumeral joint and a proximal humerus fracture. Postreduction films do demonstrate successful reduction of dislocation. There is a fracture of the greater tuberosity of the humerus    EXAMINATION:   CT OF THE LEFT SHOULDER WITHOUT CONTRAST 2/6/2022 12:44 am       TECHNIQUE:   CT of the left shoulder was performed without the administration of   intravenous contrast.  Multiplanar reformatted images are provided for   review. Dose modulation, iterative reconstruction, and/or weight based   adjustment of the mA/kV was utilized to reduce the radiation dose to as low   as reasonably achievable.       COMPARISON:   02/06/2022 radiographs       HISTORY   ORDERING SYSTEM PROVIDED HISTORY: injury   TECHNOLOGIST PROVIDED HISTORY:   Reason for exam:->injury   Decision Support Exception - unselect if not a suspected or confirmed   emergency medical condition->Emergency Medical Condition (MA)   Reason for Exam: left shoulder fx/dislocation inj, post reduction       FINDINGS:   Bones:  There is an acute traumatic comminuted fracture of the greater   tuberosity of the proximal left humerus with multiple displaced fracture   fragments. Trevor Letters is also a small ossific density adjacent to the anterior   inferior glenoid suggesting an anterior inferior glenoid fracture.       Soft Tissue: Periarticular soft tissues demonstrate no acute abnormality. There is a small amount of hemorrhage in the left subpectoral region and left   axillary region consistent with recent glenohumeral dislocation.  Limited   images of the left lung demonstrate no acute abnormality.       Joint: There is normal alignment of the glenohumeral joint.  There is a small   left glenohumeral joint hemarthrosis.  No evidence of fluid in the   subacromial subdeltoid bursa.  No evidence of large full-thickness rotator   cuff tear within the limits of this examination.  AC joint is unremarkable.           Impression   Acute traumatic comminuted fracture of the greater tuberosity of the proximal   left humerus.       Small cortical fracture of the anterior inferior rim of the glenoid.       Small glenohumeral hemarthrosis with findings consistent with recent   glenohumeral dislocation. Procedure:  No orders of the defined types were placed in this encounter. Assessment and Plan  Nam was seen today for shoulder pain. Diagnoses and all orders for this visit:    Closed displaced fracture of greater tuberosity of left humerus, initial encounter    Shoulder dislocation, left, initial encounter    Traumatic closed fx of glenoid neck of scapula w/minimal displacement, left, initial encounter        Patient is suffering a fracture dislocation of the glenohumeral joint. Fracture involves the greater tuberosity. He will require surgical ORIF and is scheduled for surgery in 7 days with Dr. Estrella Garay.  Dr. Estrella Garay has reviewed x-rays and CT scan and is agreement with plan surgery. He will need a history and physical from his primary care physician for procedure. No anti-inflammatories, fish oil, or vitamin E before surgery. Avoid aspirin and other blood thinners. Patient will continue in sling at all times except for when showering.     I discussed with Nam A Walker that his symptoms, signs, and imaging are most consistent with Proximal humerus fracture. We reviewed the natural history of these conditions and treatment options ranging from conservative measures (rest, icing, activity modification, physical therapy, pain meds, cortisone injection) to surgical options. Conservative measures have failed; he is not interested in cortisone injections. I think he is an appropriate candidate for surgery due to his young age, ongoing symptoms and dysfunction despite conservative measures. The procedure would be ORIF proximal humerus fracture. Perioperative considerations include: Pre-operative clearance from medical subspecialty. We reviewed the risks, benefits, alternatives of this approach. We discussed risks including, but not limited to, bleeding, pain, infection, scarring, damage to the neurovascular structures, blood clots, pulmonary embolus, stiffness, incomplete relief of pain, and incomplete return of function. We also reviewed the surgical details, expected recovery, and rehabilitation (6-9 months). He expressed understanding and will undergo preoperative medical evaluation and optimization. Electronically signed by Naa Nugent PA-C on 2/7/2022 at 2:36 PM  This dictation was generated by voice recognition computer software. Although all attempts are made to edit the dictation for accuracy, there may be errors in the transcription that are not intended.

## 2022-02-07 NOTE — PROGRESS NOTES
Kaylen Dial    Age 61 y.o.    male    1965    MRN [id number]    2/14/2022  Arrival Time_____________  OR Time____________145 Min     Procedure(s):  OPEN TREATMENT OF LEFT PROXIMAL HUMERUS (SURGICAL OR ANATOMICAL NECK) FRACTURE         [facility]                      General     Surgeon(s): Dai Venegas, MD      DAY ADMIT ___  SDS/OP ___  OUTPT IN BED ___         Phone [phone] (home)    PCP _____________________ Phone_________________ Epic ( ) Epic CE ( ) Appt ________    ADDITIONAL INFO __________________________________ Cardio/Consult _____________    NOTES _____________________________________________________________________    ____________________________________________________________________________    PAT APPT DATE:________ TIME: ________  FAXED QAD: _______  (__) H&P w/ hospitalist  ____________________________________________________________________________    COVID TEST: Date/Location______________        NURSING HISTORY COMPLETE: _______  (__) CBC       (__) W/ DIFF ___________  (__)  ECHO    __________  (__) Hgb A1C    ___________  (__) CHEST X RAY   __________  (__) LIPID PROFILE  ___________  (__) EKG   __________  (__) PT/PTT   ___________  (__) PFT's   __________  (__) BMP   ___________  (__) CAROTIDS  __________  (__) CMP   ___________  (__) VEIN MAPPING  __________  (__) U/A   ___________  (__) HISTORY & PHYSICAL __________  (__) URINE C & S  ___________  (__) CARDIAC CLEARANCE __________  (__) U/A W/ FLEX  ___________  (__) PULM.  CLEARANCE __________  (__) SERUM PREGNANCY ___________  (__) Check Epic DOS orders __________  (__) TYPE & SCREEN ________ repeat ( ) (__)  __________________ __________  (__) ALBUMIN   ___________  (__)  __________________ __________  (__) TRANSFERRIN  ___________  (__)  __________________ __________  (__) LIVER PROFILE  ___________  (__)  __________________ __________  (__) CARBOXY HGB  ___________  (__) URINE PREG DOS __________  (__) NICOTINE & MET.  ___________  (__) BLOOD SUGAR DOS __________  (__) PREALBUMIN  ___________    (__) MRSA NASAL SWAB ___________  (__) BLOOD THINNERS __________  (__) ACE/ ARBS: _____________________    (__) BETABLOCKERS ___________________

## 2022-02-07 NOTE — TELEPHONE ENCOUNTER
----- Message from Marisela Martinez MA sent at 2/7/2022  1:45 PM EST -----  Subject: Appointment Request    Reason for Call: Routine Pre-Op    QUESTIONS  Type of Appointment? Established Patient  Reason for appointment request? No appointments available during search  Additional Information for Provider? Needs to schedule Pre-op Sx is   2/14/22 on Left Shoulder. Please call to schedule Pre-Op. Prefers today or   tomorrow. ---------------------------------------------------------------------------  --------------  Laurent PATEL  What is the best way for the office to contact you? OK to leave message on   voicemail  Preferred Call Back Phone Number? [phone]  ---------------------------------------------------------------------------  --------------  SCRIPT ANSWERS  Relationship to Patient? Third Party  Representative Name? Juli Saini WIfe  Do you have questions for your provider that need to be answered prior to   scheduling your pre-op appointment? No  Have you been diagnosed with, awaiting test results for, or told that you   are suspected of having COVID-19 (Coronavirus)? (If patient has tested   negative or was tested as a requirement for work, school, or travel and   not based on symptoms, answer no)? No  Within the past two weeks have you developed any of the following symptoms   (answer no if symptoms have been present longer than 2 weeks or began   more than 2 weeks ago)? Fever or Chills, Cough, Shortness of breath or   difficulty breathing, Loss of taste or smell, Sore throat, Nasal   congestion, Sneezing or runny nose, Fatigue or generalized body aches   (answer no if pain is specific to a body part e.g. back pain), Diarrhea,   Headache? No  Have you had close contact with someone with COVID-19 in the last 14 days? No  (Service Expert  click yes below to proceed with [employer] As Usual   Scheduling)?  Yes

## 2022-02-07 NOTE — LETTER
[address] [place] [address] [address] [place] [address]  Phone: [phone]  Fax: [phone]    Vern Larsen        February 7, 2022     Patient: Tony Castro   YOB: 1965   Date of Visit: 2/7/2022       To Whom It May Concern: It is my medical opinion that Yosi Martinez should remain out of work beginning 02/05/2022 - 05/16/2022, due to his orthopaedic Injury. If you have any questions or concerns, please don't hesitate to call.     Sincerely,        Jarad German PA-C

## 2022-02-08 ENCOUNTER — TELEPHONE (OUTPATIENT)
Dept: ORTHOPEDIC SURGERY | Age: 57
End: 2022-02-08

## 2022-02-08 LAB
EKG ATRIAL RATE: 78 BPM
EKG DIAGNOSIS: NORMAL
EKG P-R INTERVAL: 162 MS
EKG Q-T INTERVAL: 356 MS
EKG QRS DURATION: 98 MS
EKG QTC CALCULATION (BAZETT): 405 MS
EKG R AXIS: 143 DEGREES
EKG T AXIS: 145 DEGREES
EKG VENTRICULAR RATE: 78 BPM
ESTIMATED AVERAGE GLUCOSE: 114 MG/DL
HBA1C MFR BLD: 5.6 %
MRSA SCREEN RT-PCR: NORMAL
URINE CULTURE, ROUTINE: NORMAL

## 2022-02-08 PROCEDURE — 93010 ELECTROCARDIOGRAM REPORT: CPT | Performed by: INTERNAL MEDICINE

## 2022-02-08 RX ORDER — IBUPROFEN 200 MG
200 TABLET ORAL EVERY 6 HOURS PRN
Status: ON HOLD | COMMUNITY
End: 2022-02-14 | Stop reason: HOSPADM

## 2022-02-08 NOTE — PROGRESS NOTES
1. Do not eat or drink anything after 12 midnight prior to surgery. This includes no water, chewing gum mints, or ice chips. You may brush your teeth and gargle the day of surgery but DO NOT SWALLOW THE WATER. 2. Please see your family doctor/pediatrician for a history and physical and/or concerning medications. Bring any test results/reports from your physician's office. If you are under the care of a heart doctor or specialist please be aware that you may be asked to see him or her for clearance. 3. You may be asked to stop blood thinners such as Coumadin, Plavix, Fragmin, and Lovenox or Anti-inflammatories such as Aspirin, Ibuprofen, Advil, and Naproxen prior to your surgery. Please check with your doctor before stopping these or any other medications. 4. Do not smoke, and do not drink any alcoholic beverages 24 hours prior to surgery. 5. You MUST make arrangements for a responsible adult to take you home after your surgery. For your safety, you will not be allowed to leave alone or drive yourself home. Your surgery will be cancelled if you do not have a ride home. Also for your safety, it is strongly suggested someone stay with you the first 24 hrs after your surgery. 6. A parent/legal guardian must accompany a child scheduled for surgery and plan to stay at the hospital until the child is discharged. Please do not bring other children with you. 7. For your comfort,please wear simple, loose fitting clothing to the hospital.  Please do not bring valuables (money, credit cards, checkbooks, etc.) Do not wear any makeup (including no eye makeup) or nail polish on your fingers or toes. 8. For your safety, please DO NOT wear any jewelry or piercings on day of surgery. All body piercing jewelry must be removed. 9. If you have dentures, they will be removed before going to the OR; for your convenience we will provide you with a container.   If you wear contact lenses or glasses, they will be removed, they will be removed, please bring a case for them. 10. If appicable,Please see your family doctor/pediatrician for a history & physical and/or concerning medications. Bring any test results/reports from your physician's office. 11. Remember to bring Blood Bank bracelet to the hospital on the day of surgery. 12. If you have a Living Will and Durable Power of  for Healthcare, please bring in a copy. 15. Notify your Surgeon if you develop any illness between now and surgery  time, cough, cold, fever, sore throat, nausea, vomiting, etc.  Please notify your surgeon if you experience dizziness, shortness of breath or blurred vision between now & the time of your surgery   14. DO NOT shave your operative site 96 hours prior to surgery. For face & neck surgery, men may use an electric razor 48 hours prior to surgery. 15. Shower the night before surgery with ___Antibacterial soap _X__Hibiclens   16. To provide excellent care visitors will be limited to one in the room at any given time. 17.  Please bring picture ID and insurance card. 18.  Visit our web site for additional information:  [email or web address].

## 2022-02-08 NOTE — PROGRESS NOTES
Preoperative Screening for Elective Surgery/Invasive Procedures While COVID-19 present in the community     Have you had any of the following symptoms? NONE  o Fever, chills  o Cough  o Shortness of breath  o Muscle aches/pain  o Diarrhea  o Abdominal pain, nausea, vomiting  o Loss or decrease in taste and / or smell   Risk of Exposure  o Have you recently been hospitalized for COVID-19 or flu-like illness, if so when? NO  o Recently diagnosed with COVID-19, if so when? NO  o Recently tested for COVID-19, if so when? NO - WILL HAVE PREOP COVID TEST 2/8 OR 2/9  o Have you been in close contact with a person or family member who currently has or recently had COVID-23? If yes, when and in what context? NO  o Do you live with anybody who in the last 14 days has had fever, chills, shortness of breath, muscle aches, flu-like illness? NO  o Do you have any close contacts or family members who are currently in the hospital for COVID-19 or flu-like illness? If yes, assess recent close contact with this person. NO    Indicate if the patient has a positive screen by answering yes to one or more of the above questions. Patients who test positive or screen positive prior to surgery or on the day of surgery should be evaluated in conjunction with the surgeon/proceduralist/anesthesiologist to determine the urgency of the procedure.

## 2022-02-09 ENCOUNTER — HOSPITAL ENCOUNTER (OUTPATIENT)
Age: 57
Discharge: HOME | End: 2022-02-09
Payer: COMMERCIAL

## 2022-02-09 PROCEDURE — U0005 INFEC AGEN DETEC AMPLI PROBE: HCPCS

## 2022-02-09 PROCEDURE — U0003 INFECTIOUS AGENT DETECTION BY NUCLEIC ACID (DNA OR RNA); SEVERE ACUTE RESPIRATORY SYNDROME CORONAVIRUS 2 (SARS-COV-2) (CORONAVIRUS DISEASE [COVID-19]), AMPLIFIED PROBE TECHNIQUE, MAKING USE OF HIGH THROUGHPUT TECHNOLOGIES AS DESCRIBED BY CMS-2020-01-R: HCPCS

## 2022-02-10 DIAGNOSIS — S42.252A CLOSED DISPLACED FRACTURE OF GREATER TUBEROSITY OF LEFT HUMERUS, INITIAL ENCOUNTER: Primary | ICD-10-CM

## 2022-02-10 DIAGNOSIS — S42.252A CLOSED DISPLACED FRACTURE OF GREATER TUBEROSITY OF LEFT HUMERUS, INITIAL ENCOUNTER: ICD-10-CM

## 2022-02-10 LAB — SARS-COV-2: NOT DETECTED

## 2022-02-10 RX ORDER — OXYCODONE HYDROCHLORIDE 5 MG/1
5 TABLET ORAL EVERY 6 HOURS PRN
Qty: 28 TABLET | Refills: 0 | Status: ON HOLD | OUTPATIENT
Start: 2022-02-10 | End: 2022-02-14 | Stop reason: HOSPADM

## 2022-02-10 RX ORDER — OXYCODONE HYDROCHLORIDE AND ACETAMINOPHEN 5; 325 MG/1; MG/1
1-2 TABLET ORAL EVERY 4 HOURS PRN
Qty: 19 TABLET | Refills: 0 | Status: CANCELLED | OUTPATIENT
Start: 2022-02-10 | End: 2022-02-15

## 2022-02-11 ENCOUNTER — OFFICE VISIT (OUTPATIENT)
Dept: FAMILY MEDICINE CLINIC | Age: 57
End: 2022-02-11
Payer: COMMERCIAL

## 2022-02-11 ENCOUNTER — TELEPHONE (OUTPATIENT)
Dept: ORTHOPEDIC SURGERY | Age: 57
End: 2022-02-11

## 2022-02-11 VITALS
DIASTOLIC BLOOD PRESSURE: 78 MMHG | WEIGHT: 271 LBS | OXYGEN SATURATION: 100 % | BODY MASS INDEX: 36.75 KG/M2 | HEART RATE: 85 BPM | SYSTOLIC BLOOD PRESSURE: 130 MMHG

## 2022-02-11 DIAGNOSIS — Z01.818 PREOP EXAMINATION: Primary | ICD-10-CM

## 2022-02-11 PROCEDURE — 99214 OFFICE O/P EST MOD 30 MIN: CPT | Performed by: NURSE PRACTITIONER

## 2022-02-11 NOTE — PROGRESS NOTES
Preoperative Consultation      Nam Pulliam  YOB: 1965    Date of Service:  2/11/2022    Vitals:    02/11/22 1641   BP: 130/78   Site: Right Upper Arm   Position: Sitting   Cuff Size: Large Adult   Pulse: 85   SpO2: 100%   Weight: 271 lb (122.9 kg)      Wt Readings from Last 2 Encounters:   02/11/22 271 lb (122.9 kg)   02/07/22 280 lb (127 kg)     BP Readings from Last 3 Encounters:   02/11/22 130/78   02/06/22 118/89   11/23/21 112/74        Chief Complaint   Patient presents with    Pre-op Exam     2/14/2022 [address] [facility] surgery for fracture of greater tuberosity of left humerus     Allergies   Allergen Reactions    Seasonal      Outpatient Medications Marked as Taking for the 2/11/22 encounter (Office Visit) with PAVEL Choi - CNP   Medication Sig Dispense Refill    oxyCODONE (ROXICODONE) 5 MG immediate release tablet Take 1 tablet by mouth every 6 hours as needed for Pain for up to 7 days. Intended supply: 7 days. Take lowest dose possible to manage pain 28 tablet 0    ibuprofen (ADVIL;MOTRIN) 200 MG tablet Take 200 mg by mouth every 6 hours as needed for Pain      mupirocin (BACTROBAN NASAL) 2 % nasal ointment Take by Nasal route 2 times daily for the 5 days prior to surgery (begin02/09/2022) 1 g 0    oxyCODONE-acetaminophen (PERCOCET) 5-325 MG per tablet Take 1-2 tablets by mouth every 4 hours as needed for Pain for up to 5 days. 19 tablet 0       This patient presents to the office today for a preoperative consultation at the request of surgeon, Dr. Gayle Thornton, who plans on performing Open Treatment of Left Proximal Humerus (surgical or anatomical neck) Fracture on February 14 at [facility].  The current problem began 6 days ago, and symptoms have been unchanged with time. Conservative therapy: N/A.     Planned anesthesia: General   Known anesthesia problems: None   Bleeding risk: No recent or remote history of abnormal bleeding  Personal or FH of DVT/PE: Yes-Mother just had DVT/PE due to immobilization. Patient objection to receiving blood products: No    Patient Active Problem List   Diagnosis    Seasonal allergic rhinitis    Diverticulosis of large intestine without hemorrhage    Acute left-sided low back pain with left-sided sciatica    Degenerative tear of medial meniscus of right knee    Tear of medial meniscus of right knee, current    Right knee pain    Insufficiency fracture of medial femoral condyle ([address]    Insufficiency fracture of tibia    Effusion of right knee       Past Medical History:   Diagnosis Date    Acute left-sided low back pain with left-sided sciatica 6/25/2018    Acute left-sided low back pain with left-sided sciatica     Allergic rhinitis     Degenerative tear of medial meniscus of right knee 5/14/2019    No history of previous surgery 06/08/2018    colonoscopy     Past Surgical History:   Procedure Laterality Date    COLONOSCOPY  06/08/2018    diverticulosis    KNEE ARTHROSCOPY Right 8/23/2019    RIGHT KNEE VIDEO ARTHROSCOPY, INTERNAL FIXATION MEDIAL TIBIAL PLATEAU, MEDIAL FEMORAL CONDYLE INSUFFICIENCY FRACTURE WITH BONE SUBSTITUTE   performed by Mary Smith MD at [facility]       Family History   Problem Relation Age of Onset    Diabetes Father     Heart Disease Father     Cancer Mother     COPD Mother      Social History     Socioeconomic History    Marital status:      Spouse name: Not on file    Number of children: Not on file    Years of education: Not on file    Highest education level: Not on file   Occupational History    Not on file   Tobacco Use    Smoking status: Never Smoker    Smokeless tobacco: Never Used   Vaping Use    Vaping Use: Never used   Substance and Sexual Activity    Alcohol use:  Yes     Alcohol/week: 1.0 standard drink     Types: 1 Cans of beer per week    Drug use: No    Sexual activity: Not on file   Other Topics Concern    Not on file   Social History Narrative    Not on file     Social Determinants of Health     Financial Resource Strain: Low Risk     Difficulty of Paying Living Expenses: Not hard at all   Food Insecurity: No Food Insecurity    Worried About Running Out of Food in the Last Year: Never true    [address] in the Last Year: Never true   Transportation Needs: No Transportation Needs    Lack of Transportation (Medical): No    Lack of Transportation (Non-Medical): No   Physical Activity:     Days of Exercise per Week: Not on file    Minutes of Exercise per Session: Not on file   Stress:     Feeling of Stress : Not on file   Social Connections:     Frequency of Communication with Friends and Family: Not on file    Frequency of Social Gatherings with Friends and Family: Not on file    Attends Yarsani Services: Not on file    Active Member of [address] [place] [address] or Organizations: Not on file    Attends Club or Organization Meetings: Not on file    Marital Status: Not on file   Intimate Partner Violence:     Fear of Current or Ex-Partner: Not on file    Emotionally Abused: Not on file    Physically Abused: Not on file    Sexually Abused: Not on file   Housing Stability: Unknown    Unable to Pay for Housing in the Last Year: No    Number of [place] in the Last Year: Not on file    Unstable Housing in the Last Year: No       Review of Systems  A comprehensive review of systems was negative except for what was noted in the HPI. Physical Exam   Constitutional: He is oriented to person, place, and time. He appears well-developed and well-nourished. No distress. HENT:   Head: Normocephalic and atraumatic. Mouth/Throat: Uvula is midline, oropharynx is clear and moist and mucous membranes are normal.   Eyes: Conjunctivae and EOM are normal. Pupils are equal, round, and reactive to light. Neck: Trachea normal and normal range of motion. Neck supple. No JVD present.  Carotid bruit is not present. No mass and no thyromegaly present. Cardiovascular: Normal rate, regular rhythm, normal heart sounds and intact distal pulses. Exam reveals no gallop and no friction rub. No murmur heard. Pulmonary/Chest: Effort normal and breath sounds normal. No respiratory distress. He has no wheezes. He has no rales. Abdominal: Soft. bowel sounds are normal. He exhibits no distension and no mass. There is no hepatosplenomegaly. No tenderness. Musculoskeletal: He exhibits no edema and no tenderness. Neurological: He is alert and oriented to person, place, and time. He has normal strength. No cranial nerve deficit or sensory deficit. Coordination and gait normal.   Skin: Skin is warm and dry. No rash noted. No erythema. Psychiatric: He has a normal mood and affect. His behavior is normal.     EKG Interpretation:  normal EKG, normal sinus rhythm.-Orders placed per Ortho. Lab Review   Lab Results   Component Value Date     02/07/2022    K 3.9 02/07/2022    K 4.3 11/23/2021     02/07/2022    CO2 27 02/07/2022    BUN 20 02/07/2022    CREATININE 0.8 02/07/2022    GLUCOSE 89 02/07/2022    CALCIUM 9.0 02/07/2022     No results found for: CKTOTAL, CKMB, CKMBINDEX, TROPONINI  Lab Results   Component Value Date    WBC 7.2 02/07/2022    HGB 13.5 02/07/2022    HCT 39.6 02/07/2022    MCV 91.0 02/07/2022     02/07/2022     Lab Results   Component Value Date    CHOL 184 11/23/2021    TRIG 109 11/23/2021    HDL 64 11/23/2021    HDL 46 04/23/2010           Assessment:       61 y.o. patient with planned surgery as above. Known risk factors for perioperative complications: None  Current medications which may produce withdrawal symptoms if withheld perioperatively: none      Plan:     1. Preoperative workup as follows: ECG, hemoglobin, hematocrit, electrolytes, creatinine, glucose-Performed by Ortho  2. Change in medication regimen before surgery: Discontinue NSAIDs 7 days before surgery  3.  Prophylaxis for cardiac events with perioperative beta-blockers: Not indicated  ACC/AHA indications for pre-operative beta-blocker use:    · Vascular surgery with history of postitive stress test  · Intermediate or high risk surgery with history of CAD   · Intermediate or high risk surgery with multiple clinical predictors of CAD- 2 of the following: history of compensated or prior heart failure, history of cerebrovascular disease, DM, or renal insufficiency    Routine administration of higher-dose, long-acting metoprolol in beta-blocker-naïve patients on the day of surgery, and in the absence of dose titration is associated with an overall increase in mortality. Beta-blockers should be started days to weeks prior to surgery and titrated to pulse < 70.  4. Deep vein thrombosis prophylaxis: regimen to be chosen by surgical team  5.  No contraindications to planned surgery

## 2022-02-13 ENCOUNTER — ANESTHESIA EVENT (OUTPATIENT)
Dept: OPERATING ROOM | Age: 57
End: 2022-02-13
Payer: COMMERCIAL

## 2022-02-14 ENCOUNTER — HOSPITAL ENCOUNTER (OUTPATIENT)
Age: 57
Discharge: HOME | End: 2022-02-14
Attending: ORTHOPAEDIC SURGERY | Admitting: ORTHOPAEDIC SURGERY
Payer: COMMERCIAL

## 2022-02-14 ENCOUNTER — ANESTHESIA (OUTPATIENT)
Dept: OPERATING ROOM | Age: 57
End: 2022-02-14
Payer: COMMERCIAL

## 2022-02-14 ENCOUNTER — APPOINTMENT (OUTPATIENT)
Dept: GENERAL RADIOLOGY | Age: 57
End: 2022-02-14
Attending: ORTHOPAEDIC SURGERY
Payer: COMMERCIAL

## 2022-02-14 ENCOUNTER — TELEPHONE (OUTPATIENT)
Dept: ORTHOPEDIC SURGERY | Age: 57
End: 2022-02-14

## 2022-02-14 VITALS
TEMPERATURE: 97.2 F | OXYGEN SATURATION: 93 % | BODY MASS INDEX: 38.28 KG/M2 | DIASTOLIC BLOOD PRESSURE: 64 MMHG | HEIGHT: 72 IN | HEART RATE: 71 BPM | WEIGHT: 282.6 LBS | RESPIRATION RATE: 15 BRPM | SYSTOLIC BLOOD PRESSURE: 130 MMHG

## 2022-02-14 VITALS
RESPIRATION RATE: 20 BRPM | TEMPERATURE: 96.3 F | DIASTOLIC BLOOD PRESSURE: 62 MMHG | OXYGEN SATURATION: 98 % | SYSTOLIC BLOOD PRESSURE: 104 MMHG

## 2022-02-14 DIAGNOSIS — S42.292G CLOSED 3-PART FRACTURE OF PROXIMAL HUMERUS WITH DELAYED HEALING, LEFT: Primary | ICD-10-CM

## 2022-02-14 LAB
ABO/RH: NORMAL
ANTIBODY SCREEN: NORMAL

## 2022-02-14 PROCEDURE — A4217 STERILE WATER/SALINE, 500 ML: HCPCS | Performed by: ORTHOPAEDIC SURGERY

## 2022-02-14 PROCEDURE — 2500000003 HC RX 250 WO HCPCS: Performed by: NURSE ANESTHETIST, CERTIFIED REGISTERED

## 2022-02-14 PROCEDURE — 73060 X-RAY EXAM OF HUMERUS: CPT

## 2022-02-14 PROCEDURE — 3700000000 HC ANESTHESIA ATTENDED CARE: Performed by: ORTHOPAEDIC SURGERY

## 2022-02-14 PROCEDURE — 86850 RBC ANTIBODY SCREEN: CPT

## 2022-02-14 PROCEDURE — C1713 ANCHOR/SCREW BN/BN,TIS/BN: HCPCS | Performed by: ORTHOPAEDIC SURGERY

## 2022-02-14 PROCEDURE — 23630 OPTX GR HMRL TBRS FX INT FIX: CPT | Performed by: ORTHOPAEDIC SURGERY

## 2022-02-14 PROCEDURE — 3600000015 HC SURGERY LEVEL 5 ADDTL 15MIN: Performed by: ORTHOPAEDIC SURGERY

## 2022-02-14 PROCEDURE — 2500000003 HC RX 250 WO HCPCS: Performed by: ORTHOPAEDIC SURGERY

## 2022-02-14 PROCEDURE — 86901 BLOOD TYPING SEROLOGIC RH(D): CPT

## 2022-02-14 PROCEDURE — 6360000002 HC RX W HCPCS: Performed by: NURSE ANESTHETIST, CERTIFIED REGISTERED

## 2022-02-14 PROCEDURE — 2720000010 HC SURG SUPPLY STERILE: Performed by: ORTHOPAEDIC SURGERY

## 2022-02-14 PROCEDURE — 23410 REPAIR ROTATOR CUFF ACUTE: CPT | Performed by: ORTHOPAEDIC SURGERY

## 2022-02-14 PROCEDURE — 86900 BLOOD TYPING SEROLOGIC ABO: CPT

## 2022-02-14 PROCEDURE — 2500000003 HC RX 250 WO HCPCS: Performed by: ANESTHESIOLOGY

## 2022-02-14 PROCEDURE — 2580000003 HC RX 258: Performed by: NURSE ANESTHETIST, CERTIFIED REGISTERED

## 2022-02-14 PROCEDURE — 3209999900 FLUORO FOR SURGICAL PROCEDURES

## 2022-02-14 PROCEDURE — 6370000000 HC RX 637 (ALT 250 FOR IP): Performed by: ANESTHESIOLOGY

## 2022-02-14 PROCEDURE — 64415 NJX AA&/STRD BRCH PLXS IMG: CPT | Performed by: ANESTHESIOLOGY

## 2022-02-14 PROCEDURE — C9290 INJ, BUPIVACAINE LIPOSOME: HCPCS | Performed by: ORTHOPAEDIC SURGERY

## 2022-02-14 PROCEDURE — 3700000001 HC ADD 15 MINUTES (ANESTHESIA): Performed by: ORTHOPAEDIC SURGERY

## 2022-02-14 PROCEDURE — 6360000002 HC RX W HCPCS: Performed by: ANESTHESIOLOGY

## 2022-02-14 PROCEDURE — 2780000010 HC IMPLANT OTHER: Performed by: ORTHOPAEDIC SURGERY

## 2022-02-14 PROCEDURE — 7100000001 HC PACU RECOVERY - ADDTL 15 MIN: Performed by: ORTHOPAEDIC SURGERY

## 2022-02-14 PROCEDURE — 7100000000 HC PACU RECOVERY - FIRST 15 MIN: Performed by: ORTHOPAEDIC SURGERY

## 2022-02-14 PROCEDURE — 7100000011 HC PHASE II RECOVERY - ADDTL 15 MIN: Performed by: ORTHOPAEDIC SURGERY

## 2022-02-14 PROCEDURE — 2709999900 HC NON-CHARGEABLE SUPPLY: Performed by: ORTHOPAEDIC SURGERY

## 2022-02-14 PROCEDURE — 2580000003 HC RX 258: Performed by: ORTHOPAEDIC SURGERY

## 2022-02-14 PROCEDURE — C1769 GUIDE WIRE: HCPCS | Performed by: ORTHOPAEDIC SURGERY

## 2022-02-14 PROCEDURE — 3600000005 HC SURGERY LEVEL 5 BASE: Performed by: ORTHOPAEDIC SURGERY

## 2022-02-14 PROCEDURE — 6360000002 HC RX W HCPCS: Performed by: ORTHOPAEDIC SURGERY

## 2022-02-14 PROCEDURE — 7100000010 HC PHASE II RECOVERY - FIRST 15 MIN: Performed by: ORTHOPAEDIC SURGERY

## 2022-02-14 DEVICE — OMEGA 4.75MM PEEK KNOTLESS ANCHOR SYSTEM, SINGLE
Type: IMPLANTABLE DEVICE | Site: SHOULDER | Status: FUNCTIONAL
Brand: OMEGA

## 2022-02-14 DEVICE — OMEGA 6.5MM PEEK KNOTLESS ANCHOR SYSTEM, SINGLE
Type: IMPLANTABLE DEVICE | Site: SHOULDER | Status: FUNCTIONAL
Brand: OMEGA

## 2022-02-14 DEVICE — OMEGA 4.75MM PEEK KNOTLESS ANCHOR SYSTEM, DOUBLE-DOUBLE
Type: IMPLANTABLE DEVICE | Site: SHOULDER | Status: FUNCTIONAL
Brand: OMEGA

## 2022-02-14 DEVICE — SCREW BNE L50MM DIA4MM S STL CANN LNG HALF THRD SM HEX SOCK: Type: IMPLANTABLE DEVICE | Site: SHOULDER | Status: FUNCTIONAL

## 2022-02-14 RX ORDER — METHYLPREDNISOLONE 4 MG/1
TABLET ORAL
Qty: 1 KIT | Refills: 0 | Status: SHIPPED | OUTPATIENT
Start: 2022-02-14 | End: 2022-08-09 | Stop reason: ALTCHOICE

## 2022-02-14 RX ORDER — OXYCODONE HYDROCHLORIDE 5 MG/1
5 TABLET ORAL EVERY 6 HOURS PRN
Qty: 28 TABLET | Refills: 0 | Status: SHIPPED | OUTPATIENT
Start: 2022-02-14 | End: 2022-02-28 | Stop reason: SDUPTHER

## 2022-02-14 RX ORDER — SODIUM CHLORIDE 0.9 % (FLUSH) 0.9 %
10 SYRINGE (ML) INJECTION EVERY 12 HOURS SCHEDULED
Status: CANCELLED | OUTPATIENT
Start: 2022-02-14

## 2022-02-14 RX ORDER — OXYCODONE HYDROCHLORIDE 5 MG/1
10 TABLET ORAL EVERY 4 HOURS PRN
Status: CANCELLED | OUTPATIENT
Start: 2022-02-14

## 2022-02-14 RX ORDER — OXYCODONE HYDROCHLORIDE AND ACETAMINOPHEN 5; 325 MG/1; MG/1
1 TABLET ORAL PRN
Status: COMPLETED | OUTPATIENT
Start: 2022-02-14 | End: 2022-02-14

## 2022-02-14 RX ORDER — PROMETHAZINE HYDROCHLORIDE 25 MG/ML
6.25 INJECTION, SOLUTION INTRAMUSCULAR; INTRAVENOUS
Status: DISCONTINUED | OUTPATIENT
Start: 2022-02-14 | End: 2022-02-14 | Stop reason: HOSPADM

## 2022-02-14 RX ORDER — FENTANYL CITRATE 50 UG/ML
INJECTION, SOLUTION INTRAMUSCULAR; INTRAVENOUS PRN
Status: DISCONTINUED | OUTPATIENT
Start: 2022-02-14 | End: 2022-02-14 | Stop reason: SDUPTHER

## 2022-02-14 RX ORDER — MORPHINE SULFATE 4 MG/ML
4 INJECTION, SOLUTION INTRAMUSCULAR; INTRAVENOUS
Status: CANCELLED | OUTPATIENT
Start: 2022-02-14

## 2022-02-14 RX ORDER — ONDANSETRON 8 MG/1
4 TABLET, ORALLY DISINTEGRATING ORAL EVERY 8 HOURS PRN
Status: CANCELLED | OUTPATIENT
Start: 2022-02-14

## 2022-02-14 RX ORDER — VANCOMYCIN HYDROCHLORIDE 1 G/20ML
INJECTION, POWDER, LYOPHILIZED, FOR SOLUTION INTRAVENOUS PRN
Status: DISCONTINUED | OUTPATIENT
Start: 2022-02-14 | End: 2022-02-14 | Stop reason: ALTCHOICE

## 2022-02-14 RX ORDER — HYDRALAZINE HYDROCHLORIDE 20 MG/ML
5 INJECTION INTRAMUSCULAR; INTRAVENOUS EVERY 10 MIN PRN
Status: DISCONTINUED | OUTPATIENT
Start: 2022-02-14 | End: 2022-02-14 | Stop reason: HOSPADM

## 2022-02-14 RX ORDER — ONDANSETRON 4 MG/1
4 TABLET, FILM COATED ORAL 3 TIMES DAILY PRN
Qty: 15 TABLET | Refills: 0 | Status: SHIPPED | OUTPATIENT
Start: 2022-02-14

## 2022-02-14 RX ORDER — OXYCODONE HYDROCHLORIDE AND ACETAMINOPHEN 5; 325 MG/1; MG/1
2 TABLET ORAL PRN
Status: COMPLETED | OUTPATIENT
Start: 2022-02-14 | End: 2022-02-14

## 2022-02-14 RX ORDER — DEXAMETHASONE SODIUM PHOSPHATE 4 MG/ML
INJECTION, SOLUTION INTRA-ARTICULAR; INTRALESIONAL; INTRAMUSCULAR; INTRAVENOUS; SOFT TISSUE PRN
Status: DISCONTINUED | OUTPATIENT
Start: 2022-02-14 | End: 2022-02-14 | Stop reason: SDUPTHER

## 2022-02-14 RX ORDER — DIPHENHYDRAMINE HYDROCHLORIDE 50 MG/ML
12.5 INJECTION INTRAMUSCULAR; INTRAVENOUS
Status: DISCONTINUED | OUTPATIENT
Start: 2022-02-14 | End: 2022-02-14 | Stop reason: HOSPADM

## 2022-02-14 RX ORDER — SODIUM CHLORIDE, SODIUM LACTATE, POTASSIUM CHLORIDE, CALCIUM CHLORIDE 600; 310; 30; 20 MG/100ML; MG/100ML; MG/100ML; MG/100ML
INJECTION, SOLUTION INTRAVENOUS CONTINUOUS PRN
Status: DISCONTINUED | OUTPATIENT
Start: 2022-02-14 | End: 2022-02-14 | Stop reason: SDUPTHER

## 2022-02-14 RX ORDER — MIDAZOLAM HYDROCHLORIDE 1 MG/ML
INJECTION INTRAMUSCULAR; INTRAVENOUS PRN
Status: DISCONTINUED | OUTPATIENT
Start: 2022-02-14 | End: 2022-02-14 | Stop reason: SDUPTHER

## 2022-02-14 RX ORDER — ROCURONIUM BROMIDE 10 MG/ML
INJECTION, SOLUTION INTRAVENOUS PRN
Status: DISCONTINUED | OUTPATIENT
Start: 2022-02-14 | End: 2022-02-14 | Stop reason: SDUPTHER

## 2022-02-14 RX ORDER — ONDANSETRON 2 MG/ML
INJECTION INTRAMUSCULAR; INTRAVENOUS PRN
Status: DISCONTINUED | OUTPATIENT
Start: 2022-02-14 | End: 2022-02-14 | Stop reason: SDUPTHER

## 2022-02-14 RX ORDER — BUPIVACAINE HYDROCHLORIDE 5 MG/ML
INJECTION, SOLUTION EPIDURAL; INTRACAUDAL
Status: COMPLETED | OUTPATIENT
Start: 2022-02-14 | End: 2022-02-14

## 2022-02-14 RX ORDER — MORPHINE SULFATE 2 MG/ML
2 INJECTION, SOLUTION INTRAMUSCULAR; INTRAVENOUS
Status: CANCELLED | OUTPATIENT
Start: 2022-02-14

## 2022-02-14 RX ORDER — MEPERIDINE HYDROCHLORIDE 50 MG/ML
12.5 INJECTION INTRAMUSCULAR; INTRAVENOUS; SUBCUTANEOUS EVERY 5 MIN PRN
Status: DISCONTINUED | OUTPATIENT
Start: 2022-02-14 | End: 2022-02-14 | Stop reason: HOSPADM

## 2022-02-14 RX ORDER — CEPHALEXIN 500 MG/1
500 CAPSULE ORAL 4 TIMES DAILY
Qty: 4 CAPSULE | Refills: 0 | Status: SHIPPED | OUTPATIENT
Start: 2022-02-14

## 2022-02-14 RX ORDER — CYCLOBENZAPRINE HCL 10 MG
10 TABLET ORAL 3 TIMES DAILY PRN
Qty: 30 TABLET | Refills: 0 | Status: SHIPPED | OUTPATIENT
Start: 2022-02-14 | End: 2022-02-28 | Stop reason: SDUPTHER

## 2022-02-14 RX ORDER — PROPOFOL 10 MG/ML
INJECTION, EMULSION INTRAVENOUS PRN
Status: DISCONTINUED | OUTPATIENT
Start: 2022-02-14 | End: 2022-02-14 | Stop reason: SDUPTHER

## 2022-02-14 RX ORDER — LABETALOL HYDROCHLORIDE 5 MG/ML
INJECTION, SOLUTION INTRAVENOUS PRN
Status: DISCONTINUED | OUTPATIENT
Start: 2022-02-14 | End: 2022-02-14 | Stop reason: SDUPTHER

## 2022-02-14 RX ORDER — LABETALOL HYDROCHLORIDE 5 MG/ML
5 INJECTION, SOLUTION INTRAVENOUS EVERY 10 MIN PRN
Status: DISCONTINUED | OUTPATIENT
Start: 2022-02-14 | End: 2022-02-14 | Stop reason: HOSPADM

## 2022-02-14 RX ORDER — SODIUM CHLORIDE 0.9 % (FLUSH) 0.9 %
10 SYRINGE (ML) INJECTION PRN
Status: CANCELLED | OUTPATIENT
Start: 2022-02-14

## 2022-02-14 RX ORDER — MORPHINE SULFATE 2 MG/ML
2 INJECTION, SOLUTION INTRAMUSCULAR; INTRAVENOUS EVERY 5 MIN PRN
Status: DISCONTINUED | OUTPATIENT
Start: 2022-02-14 | End: 2022-02-14 | Stop reason: HOSPADM

## 2022-02-14 RX ORDER — MELOXICAM 15 MG/1
15 TABLET ORAL DAILY
Qty: 30 TABLET | Refills: 0 | Status: SHIPPED | OUTPATIENT
Start: 2022-02-14 | End: 2022-08-09 | Stop reason: SDUPTHER

## 2022-02-14 RX ORDER — PHENYLEPHRINE HCL IN 0.9% NACL 1 MG/10 ML
SYRINGE (ML) INTRAVENOUS PRN
Status: DISCONTINUED | OUTPATIENT
Start: 2022-02-14 | End: 2022-02-14 | Stop reason: SDUPTHER

## 2022-02-14 RX ORDER — LIDOCAINE HYDROCHLORIDE 20 MG/ML
INJECTION, SOLUTION INFILTRATION; PERINEURAL PRN
Status: DISCONTINUED | OUTPATIENT
Start: 2022-02-14 | End: 2022-02-14 | Stop reason: SDUPTHER

## 2022-02-14 RX ORDER — ASPIRIN 81 MG/1
81 TABLET ORAL 2 TIMES DAILY
Qty: 60 TABLET | Refills: 0 | Status: SHIPPED | OUTPATIENT
Start: 2022-02-15

## 2022-02-14 RX ORDER — OXYCODONE HYDROCHLORIDE 5 MG/1
5 TABLET ORAL EVERY 4 HOURS PRN
Status: CANCELLED | OUTPATIENT
Start: 2022-02-14

## 2022-02-14 RX ORDER — SODIUM CHLORIDE, SODIUM LACTATE, POTASSIUM CHLORIDE, CALCIUM CHLORIDE 600; 310; 30; 20 MG/100ML; MG/100ML; MG/100ML; MG/100ML
INJECTION, SOLUTION INTRAVENOUS CONTINUOUS
Status: DISCONTINUED | OUTPATIENT
Start: 2022-02-14 | End: 2022-02-14 | Stop reason: HOSPADM

## 2022-02-14 RX ORDER — TRANEXAMIC ACID 100 MG/ML
INJECTION, SOLUTION INTRAVENOUS PRN
Status: DISCONTINUED | OUTPATIENT
Start: 2022-02-14 | End: 2022-02-14 | Stop reason: SDUPTHER

## 2022-02-14 RX ORDER — SODIUM CHLORIDE 9 MG/ML
25 INJECTION, SOLUTION INTRAVENOUS PRN
Status: CANCELLED | OUTPATIENT
Start: 2022-02-14

## 2022-02-14 RX ORDER — ONDANSETRON 2 MG/ML
4 INJECTION INTRAMUSCULAR; INTRAVENOUS EVERY 6 HOURS PRN
Status: CANCELLED | OUTPATIENT
Start: 2022-02-14

## 2022-02-14 RX ORDER — ONDANSETRON 2 MG/ML
4 INJECTION INTRAMUSCULAR; INTRAVENOUS PRN
Status: DISCONTINUED | OUTPATIENT
Start: 2022-02-14 | End: 2022-02-14 | Stop reason: HOSPADM

## 2022-02-14 RX ORDER — SODIUM CHLORIDE, SODIUM LACTATE, POTASSIUM CHLORIDE, CALCIUM CHLORIDE 600; 310; 30; 20 MG/100ML; MG/100ML; MG/100ML; MG/100ML
INJECTION, SOLUTION INTRAVENOUS CONTINUOUS
Status: CANCELLED | OUTPATIENT
Start: 2022-02-14

## 2022-02-14 RX ORDER — MORPHINE SULFATE 2 MG/ML
1 INJECTION, SOLUTION INTRAMUSCULAR; INTRAVENOUS EVERY 5 MIN PRN
Status: DISCONTINUED | OUTPATIENT
Start: 2022-02-14 | End: 2022-02-14 | Stop reason: HOSPADM

## 2022-02-14 RX ADMIN — Medication 100 MCG: at 13:46

## 2022-02-14 RX ADMIN — BUPIVACAINE HYDROCHLORIDE 20 ML: 5 INJECTION, SOLUTION EPIDURAL; INTRACAUDAL; PERINEURAL at 11:19

## 2022-02-14 RX ADMIN — ROCURONIUM BROMIDE 10 MG: 10 SOLUTION INTRAVENOUS at 13:08

## 2022-02-14 RX ADMIN — Medication 50 MCG: at 13:35

## 2022-02-14 RX ADMIN — LABETALOL HYDROCHLORIDE 5 MG: 5 INJECTION, SOLUTION INTRAVENOUS at 12:59

## 2022-02-14 RX ADMIN — Medication 100 MCG: at 14:52

## 2022-02-14 RX ADMIN — CEFAZOLIN SODIUM 2 G: 10 INJECTION, POWDER, FOR SOLUTION INTRAVENOUS at 12:45

## 2022-02-14 RX ADMIN — TRANEXAMIC ACID 1000 MG: 100 INJECTION, SOLUTION INTRAVENOUS at 13:02

## 2022-02-14 RX ADMIN — SUGAMMADEX 200 MG: 100 INJECTION, SOLUTION INTRAVENOUS at 15:21

## 2022-02-14 RX ADMIN — DEXAMETHASONE SODIUM PHOSPHATE 8 MG: 4 INJECTION, SOLUTION INTRAMUSCULAR; INTRAVENOUS at 13:00

## 2022-02-14 RX ADMIN — PROPOFOL 160 MG: 10 INJECTION, EMULSION INTRAVENOUS at 14:54

## 2022-02-14 RX ADMIN — Medication 50 MCG: at 14:03

## 2022-02-14 RX ADMIN — ROCURONIUM BROMIDE 5 MG: 10 SOLUTION INTRAVENOUS at 14:50

## 2022-02-14 RX ADMIN — FENTANYL CITRATE 100 MCG: 50 INJECTION INTRAMUSCULAR; INTRAVENOUS at 12:46

## 2022-02-14 RX ADMIN — SODIUM CHLORIDE, SODIUM LACTATE, POTASSIUM CHLORIDE, AND CALCIUM CHLORIDE: .6; .31; .03; .02 INJECTION, SOLUTION INTRAVENOUS at 12:44

## 2022-02-14 RX ADMIN — Medication 100 MCG: at 13:21

## 2022-02-14 RX ADMIN — LIDOCAINE HYDROCHLORIDE 100 MG: 20 INJECTION, SOLUTION INFILTRATION; PERINEURAL at 12:49

## 2022-02-14 RX ADMIN — Medication 100 MCG: at 13:27

## 2022-02-14 RX ADMIN — Medication 50 MCG: at 13:37

## 2022-02-14 RX ADMIN — PROPOFOL 200 MG: 10 INJECTION, EMULSION INTRAVENOUS at 12:49

## 2022-02-14 RX ADMIN — Medication 100 MCG: at 13:14

## 2022-02-14 RX ADMIN — Medication 100 MCG: at 13:26

## 2022-02-14 RX ADMIN — MIDAZOLAM HYDROCHLORIDE 2 MG: 2 INJECTION, SOLUTION INTRAMUSCULAR; INTRAVENOUS at 11:08

## 2022-02-14 RX ADMIN — SODIUM CHLORIDE, SODIUM LACTATE, POTASSIUM CHLORIDE, AND CALCIUM CHLORIDE: .6; .31; .03; .02 INJECTION, SOLUTION INTRAVENOUS at 13:23

## 2022-02-14 RX ADMIN — ROCURONIUM BROMIDE 50 MG: 10 SOLUTION INTRAVENOUS at 12:50

## 2022-02-14 RX ADMIN — ONDANSETRON 4 MG: 2 INJECTION INTRAMUSCULAR; INTRAVENOUS at 13:30

## 2022-02-14 RX ADMIN — OXYCODONE AND ACETAMINOPHEN 2 TABLET: 5; 325 TABLET ORAL at 17:06

## 2022-02-14 RX ADMIN — ROCURONIUM BROMIDE 20 MG: 10 SOLUTION INTRAVENOUS at 13:35

## 2022-02-14 RX ADMIN — ONDANSETRON 4 MG: 2 INJECTION INTRAMUSCULAR; INTRAVENOUS at 13:00

## 2022-02-14 ASSESSMENT — PULMONARY FUNCTION TESTS
PIF_VALUE: 25
PIF_VALUE: 24
PIF_VALUE: 24
PIF_VALUE: 23
PIF_VALUE: 25
PIF_VALUE: 23
PIF_VALUE: 25
PIF_VALUE: 23
PIF_VALUE: 5
PIF_VALUE: 23
PIF_VALUE: 21
PIF_VALUE: 15
PIF_VALUE: 20
PIF_VALUE: 23
PIF_VALUE: 22
PIF_VALUE: 23
PIF_VALUE: 22
PIF_VALUE: 23
PIF_VALUE: 16
PIF_VALUE: 5
PIF_VALUE: 23
PIF_VALUE: 22
PIF_VALUE: 23
PIF_VALUE: 23
PIF_VALUE: 21
PIF_VALUE: 20
PIF_VALUE: 22
PIF_VALUE: 23
PIF_VALUE: 24
PIF_VALUE: 23
PIF_VALUE: 22
PIF_VALUE: 23
PIF_VALUE: 23
PIF_VALUE: 22
PIF_VALUE: 23
PIF_VALUE: 23
PIF_VALUE: 22
PIF_VALUE: 22
PIF_VALUE: 25
PIF_VALUE: 23
PIF_VALUE: 20
PIF_VALUE: 24
PIF_VALUE: 23
PIF_VALUE: 21
PIF_VALUE: 23
PIF_VALUE: 2
PIF_VALUE: 23
PIF_VALUE: 22
PIF_VALUE: 24
PIF_VALUE: 23
PIF_VALUE: 2
PIF_VALUE: 24
PIF_VALUE: 25
PIF_VALUE: 0
PIF_VALUE: 23
PIF_VALUE: 23
PIF_VALUE: 22
PIF_VALUE: 0
PIF_VALUE: 23
PIF_VALUE: 25
PIF_VALUE: 24
PIF_VALUE: 23
PIF_VALUE: 24
PIF_VALUE: 3
PIF_VALUE: 23
PIF_VALUE: 23
PIF_VALUE: 22
PIF_VALUE: 25
PIF_VALUE: 23
PIF_VALUE: 23
PIF_VALUE: 24
PIF_VALUE: 25
PIF_VALUE: 23
PIF_VALUE: 23
PIF_VALUE: 25
PIF_VALUE: 25
PIF_VALUE: 4
PIF_VALUE: 23
PIF_VALUE: 23
PIF_VALUE: 22
PIF_VALUE: 23
PIF_VALUE: 23
PIF_VALUE: 14
PIF_VALUE: 23
PIF_VALUE: 1
PIF_VALUE: 21
PIF_VALUE: 23
PIF_VALUE: 23
PIF_VALUE: 24
PIF_VALUE: 23
PIF_VALUE: 23
PIF_VALUE: 25
PIF_VALUE: 21
PIF_VALUE: 23
PIF_VALUE: 24
PIF_VALUE: 25
PIF_VALUE: 21
PIF_VALUE: 22
PIF_VALUE: 14
PIF_VALUE: 20
PIF_VALUE: 23
PIF_VALUE: 25
PIF_VALUE: 10
PIF_VALUE: 24
PIF_VALUE: 3
PIF_VALUE: 3
PIF_VALUE: 23
PIF_VALUE: 23
PIF_VALUE: 25
PIF_VALUE: 1
PIF_VALUE: 23
PIF_VALUE: 22
PIF_VALUE: 23
PIF_VALUE: 21
PIF_VALUE: 22
PIF_VALUE: 3
PIF_VALUE: 24
PIF_VALUE: 4
PIF_VALUE: 23
PIF_VALUE: 25
PIF_VALUE: 25
PIF_VALUE: 3
PIF_VALUE: 21
PIF_VALUE: 24
PIF_VALUE: 24
PIF_VALUE: 21
PIF_VALUE: 0
PIF_VALUE: 3
PIF_VALUE: 23
PIF_VALUE: 23
PIF_VALUE: 25
PIF_VALUE: 22
PIF_VALUE: 23
PIF_VALUE: 22
PIF_VALUE: 23
PIF_VALUE: 23
PIF_VALUE: 25
PIF_VALUE: 23
PIF_VALUE: 22
PIF_VALUE: 23
PIF_VALUE: 22
PIF_VALUE: 2
PIF_VALUE: 24
PIF_VALUE: 22
PIF_VALUE: 23
PIF_VALUE: 24
PIF_VALUE: 20
PIF_VALUE: 23
PIF_VALUE: 5
PIF_VALUE: 23
PIF_VALUE: 14
PIF_VALUE: 23

## 2022-02-14 ASSESSMENT — PAIN SCALES - GENERAL: PAINLEVEL_OUTOF10: 6

## 2022-02-14 ASSESSMENT — PAIN - FUNCTIONAL ASSESSMENT: PAIN_FUNCTIONAL_ASSESSMENT: 0-10

## 2022-02-14 NOTE — ANESTHESIA PROCEDURE NOTES
Peripheral Block    Patient location during procedure: pre-op  Start time: 2/14/2022 11:09 AM  End time: 2/14/2022 11:18 AM  Staffing  Performed: anesthesiologist   Anesthesiologist: Bennie Lang MD  Preanesthetic Checklist  Completed: patient identified, IV checked, site marked, risks and benefits discussed, surgical consent, monitors and equipment checked, pre-op evaluation, timeout performed, anesthesia consent given, oxygen available and patient being monitored  Peripheral Block  Patient position: sitting  Prep: ChloraPrep  Patient monitoring: cardiac monitor, continuous pulse ox, frequent blood pressure checks and IV access  Block type: Brachial plexus  Laterality: left  Injection technique: single-shot  Guidance: ultrasound guided  Local infiltration: lidocaine  Infiltration strength: 1 %  Dose: 3 mL  Interscalene  Provider prep: mask and sterile gloves  Local infiltration: lidocaine  Needle  Needle gauge: 21 G  Needle length: 10 cm  Needle localization: ultrasound guidance  Assessment  Injection assessment: negative aspiration for heme, no paresthesia on injection and local visualized surrounding nerve on ultrasound  Paresthesia pain: none  Slow fractionated injection: yes  Hemodynamics: stable  Additional Notes  Immediately prior to procedure a \"time out\" was called to verify the correct patient, allergies, laterality, procedure and equipment. Time out performed with Katharine Nicholas RN    Anterior scalene and middle scalene muscles, upper, middle and lower trunks of the brachial plexus are identified, the tip of the needle and the spread of the local anesthetic around the brachial plexus are visualized. The Brachial plexus appeared to be anatomically normal and there were no abnormal pathologically findings seen.          Medications Administered  Bupivacaine (MARCAINE) PF injection 0.5%, 20 mL  Reason for block: post-op pain management and at surgeon's request

## 2022-02-14 NOTE — PROGRESS NOTES
Interscalene block completed and patients left shoulder wiped with CHG wipes, family at bedside  Sharri Mathur RN

## 2022-02-14 NOTE — H&P
Update History & Physical     The patient's History and Physical of 2/11/2022 was reviewed with the patient and I examined the patient. There was no change. The surgical site was confirmed by the patient and me. Plan: The risks, benefits, expected outcome, and alternative to the recommended procedure have been discussed with the patient / family. Patient understands and wants to proceed with the procedure.       Electronically signed by Hetal Evans MD on 2/14/2022 at 11:07 AM

## 2022-02-14 NOTE — PROGRESS NOTES
Pt has red patchy areas around face and eyes. Dr. Laurent Grewal aware - no intervention given - will continue to monitor.

## 2022-02-14 NOTE — ANESTHESIA PRE PROCEDURE
Department of Anesthesiology  Preprocedure Note       Name:  Abebe Pettit   Age:  61 y.o.  :  1965                                          MRN:  [id number]         Date:  2022      Surgeon: Tio Zamarripa): Chuck Ayala MD    Procedure: Procedure(s):  OPEN TREATMENT OF LEFT PROXIMAL HUMERUS (SURGICAL OR ANATOMICAL NECK) Yary Due / [employer]en Gazella & NEPHEW    Medications prior to admission:   Prior to Admission medications    Medication Sig Start Date End Date Taking? Authorizing Provider   oxyCODONE (ROXICODONE) 5 MG immediate release tablet Take 1 tablet by mouth every 6 hours as needed for Pain for up to 7 days. Intended supply: 7 days. Take lowest dose possible to manage pain 2/10/22 2/17/22 Yes Naa Nugent PA-C   ibuprofen (ADVIL;MOTRIN) 200 MG tablet Take 200 mg by mouth every 6 hours as needed for Pain   Yes Historical Provider, MD   mupirocin (BACTROBAN NASAL) 2 % nasal ointment Take by Nasal route 2 times daily for the 5 days prior to surgery (begin2022) 22 Yes Chuck Ayala MD       Current medications:    Current Facility-Administered Medications   Medication Dose Route Frequency Provider Last Rate Last Admin    tranexamic acid (CYKLOKAPRON) 1,000 mg in dextrose 5 % 100 mL IVPB  1,000 mg IntraVENous On Call to [address], PASophiaC        ceFAZolin (ANCEF) 3000 mg in dextrose 5 % 100 mL IVPB  3,000 mg IntraVENous On Call to [address], PA-C        lactated ringers infusion   IntraVENous Continuous Harjinder Bermeo MD        lidocaine 1 % (PF) injection 0.1 mL  0.1 mL IntraDERmal Once PRN Harjinder Bermeo MD           Allergies:     Allergies   Allergen Reactions    Seasonal        Problem List:    Patient Active Problem List   Diagnosis Code    Seasonal allergic rhinitis J30.2    Diverticulosis of large intestine without hemorrhage K57.30    Acute left-sided low back pain with left-sided sciatica M54.42    Degenerative tear of medial meniscus of right knee M23.203    Tear of medial meniscus of right knee, current S83.241A    Right knee pain M25.561    Insufficiency fracture of medial femoral condyle (HCC) M84.453A    Insufficiency fracture of tibia M84.469A    Effusion of right knee M25.461    Closed 3-part fracture of proximal humerus with delayed healing, left S42.292G       Past Medical History:        Diagnosis Date    Acute left-sided low back pain with left-sided sciatica 6/25/2018    Acute left-sided low back pain with left-sided sciatica     Allergic rhinitis     Degenerative tear of medial meniscus of right knee 5/14/2019    No history of previous surgery 06/08/2018    colonoscopy       Past Surgical History:        Procedure Laterality Date    COLONOSCOPY  06/08/2018    diverticulosis    KNEE ARTHROSCOPY Right 8/23/2019    RIGHT KNEE VIDEO ARTHROSCOPY, INTERNAL FIXATION MEDIAL TIBIAL PLATEAU, MEDIAL FEMORAL CONDYLE INSUFFICIENCY FRACTURE WITH BONE SUBSTITUTE   performed by Shekhar Damian [facility] at [facility]         Social History:    Social History     Tobacco Use    Smoking status: Never Smoker    Smokeless tobacco: Never Used   Substance Use Topics    Alcohol use:  Yes     Alcohol/week: 1.0 standard drink     Types: 1 Cans of beer per week                                Counseling given: Not Answered      Vital Signs (Current):   Vitals:    02/08/22 0956 02/14/22 0957   BP:  (!) 154/89   Pulse:  89   Resp:  18   Temp:  97.7 °F (36.5 °C)   TempSrc:  Temporal   SpO2:  99%   Weight: 271 lb (122.9 kg) 282 lb 9.6 oz (128.2 kg)   Height: 6' (1.829 m) 6' (1.829 m)                                              BP Readings from Last 3 Encounters:   02/14/22 (!) 154/89   02/11/22 130/78   02/06/22 118/89       NPO Status: Time of last liquid consumption: 1900                        Time of last solid consumption: 1900                        Date of last liquid consumption: 02/13/22 Date of last solid food consumption: 02/13/22    BMI:   Wt Readings from Last 3 Encounters:   02/14/22 282 lb 9.6 oz (128.2 kg)   02/11/22 271 lb (122.9 kg)   02/07/22 280 lb (127 kg)     Body mass index is 38.33 kg/m². CBC:   Lab Results   Component Value Date    WBC 7.2 02/07/2022    RBC 4.35 02/07/2022    HGB 13.5 02/07/2022    HCT 39.6 02/07/2022    MCV 91.0 02/07/2022    RDW 13.7 02/07/2022     02/07/2022       CMP:   Lab Results   Component Value Date     02/07/2022    K 3.9 02/07/2022    K 4.3 11/23/2021     02/07/2022    CO2 27 02/07/2022    BUN 20 02/07/2022    CREATININE 0.8 02/07/2022    GFRAA >60 02/07/2022    GFRAA >60 04/23/2010    AGRATIO 2.0 11/23/2021    LABGLOM >60 02/07/2022    GLUCOSE 89 02/07/2022    PROT 7.3 11/23/2021    PROT 7.0 04/23/2010    CALCIUM 9.0 02/07/2022    BILITOT 0.6 11/23/2021    ALKPHOS 75 11/23/2021    AST 34 11/23/2021    ALT 32 11/23/2021       POC Tests: No results for input(s): POCGLU, POCNA, POCK, POCCL, POCBUN, POCHEMO, POCHCT in the last 72 hours.     Coags:   Lab Results   Component Value Date    PROTIME 10.2 02/07/2022    INR 0.91 02/07/2022    APTT 31.5 02/07/2022       HCG (If Applicable): No results found for: PREGTESTUR, PREGSERUM, HCG, HCGQUANT     ABGs: No results found for: PHART, PO2ART, WNW1XFE, LWQ2XXL, BEART, Z9TUAKTA     Type & Screen (If Applicable):  No results found for: LABABO, LABRH    Drug/Infectious Status (If Applicable):  No results found for: HIV, HEPCAB    COVID-19 Screening (If Applicable):   Lab Results   Component Value Date    COVID19 Not Detected 02/09/2022           Anesthesia Evaluation  Patient summary reviewed and Nursing notes reviewed  Airway: Mallampati: I  TM distance: >3 FB   Neck ROM: full  Mouth opening: > = 3 FB Dental: normal exam         Pulmonary:Negative Pulmonary ROS and normal exam  breath sounds clear to auscultation                             Cardiovascular:Negative CV ROS            Rhythm: regular  Rate: normal                    Neuro/Psych:   Negative Neuro/Psych ROS              GI/Hepatic/Renal: Neg GI/Hepatic/Renal ROS            Endo/Other:    (+) : arthritis: OA., .                 Abdominal:   (+) obese,           Vascular: negative vascular ROS. Other Findings:             Anesthesia Plan      general     ASA 2     (Block)  Induction: intravenous. MIPS: Postoperative opioids intended and Prophylactic antiemetics administered. Anesthetic plan and risks discussed with patient. Plan discussed with CRNA.                   Roro Sanchez MD   2/14/2022

## 2022-02-14 NOTE — ANESTHESIA POSTPROCEDURE EVALUATION
Department of Anesthesiology  Postprocedure Note    Patient: Leyla Simmons  MRN: [id number]  YOB: 1965  Date of evaluation: 2/14/2022  Time:  6:18 PM     Procedure Summary     Date: 02/14/22 Room / Location: [facility]    Anesthesia Start: 1244 Anesthesia Stop: 1386    Procedure: OPEN TREATMENT OF LEFT PROXIMAL HUMERUS (SURGICAL OR ANATOMICAL NECK) Laquita Portillo / Tim Rule & NEPHEW (Left Shoulder) Diagnosis:       Closed fracture of proximal end of left humerus, unspecified fracture morphology, initial encounter      (UNSPECIFIED FRACTURE OF UPPER END OF LEFT HUMERUS, INITIAL ENCOUNTER FOR CLOSED FRACTURE)    Surgeons: Ramana Coon MD Responsible Provider: Roro Sanchez MD    Anesthesia Type: general ASA Status: 2          Anesthesia Type: general    Miriam Phase I: Miriam Score: 4    Miriam Phase II: Miriam Score: 10    Last vitals: Reviewed and per EMR flowsheets.        Anesthesia Post Evaluation    Patient location during evaluation: PACU  Level of consciousness: awake  Airway patency: patent  Nausea & Vomiting: no nausea  Complications: no  Cardiovascular status: blood pressure returned to baseline  Respiratory status: acceptable  Hydration status: euvolemic

## 2022-02-14 NOTE — OP NOTE
Orthopaedic Surgery  Operative Report      Patient Name:  Abebe Pettit  Patient :  1965  MRN: [id number]    Date: 22     Pre-operative Diagnosis:   Left shoulder dislocation, instability  Displaced, comminuted left greater tuberosity proximal humerus fracture  Rotator cuff tear traumatic acute    Post-operative Diagnosis:    Same    Procedure: LEFT  20209 Open treatment of greater tuberosity fracture -modifier 22  86010 Repair of ruptured musculotendinous cuff: acute    Surgeon:  Surgeon(s) and Role:     * Chuck Ayala MD - Primary    Assistant: Circulator: Cielo Kahn RN; Geena Kelsey RN  Surgical Assistant: [facility]  Radiology Technologist: Osito Monae  Scrub Person First: Marisabel Mancilla    Anesthesia: General endotracheal anesthesia, Intraoperative local infiltration - Exparel/marcaine solution and Regional with interscalene nerve block preop    Estimated blood loss: 150    Specimens: * No specimens in log *    Complications: None    Drains: None    Condition: Stable    Implants:   Implant Name Type Inv.  Item Serial No.  Lot No. LRB No. Used Action   ANCHOR BNE SINGLE 6.5 MM KNOTLESS ANCHR SYS OMEGA - [id number]  ANCHOR BNE SINGLE 6.5 MM KNOTLESS ANCHR SYS OMEGA  [employer]-  Left 1 Implanted   ANCHOR SUTURE SINGLE 4.75 MM PEEK KNOTLESS SYS OMEGA - [id number]  ANCHOR SUTURE SINGLE 4.75 MM PEEK KNOTLESS SYS OMEGA  [employer]-  Left 1 Implanted   ANCHOR SUTURE DA 4.75 MM PEEK KNOTLESS SYS OMEGA - [id number]  ANCHOR SUTURE DA 4.75 MM PEEK KNOTLESS SYS OMEGA  [employer]-  Left 1 Implanted   ANCHOR SUTURE DA 4.75 MM PEEK KNOTLESS SYS OMEGA - [id number]  ANCHOR SUTURE DA 4.75 MM PEEK KNOTLESS SYS OMEGA  [employer]-  Left 1 Implanted   SCREW BNE L50MM DIA4MM S STL LENNOX LNG HALF THRD SM HEX SOCK - [id number]  SCREW BNE L50MM DIA4MM S STL LENNOX LNG HALF THRD SM HEX SOCK  [employer]-  Left 1 Implanted Findings:   1. Left shoulder instability, anterior  2. Comminuted greater tuberosity fracture  3. Acute rotator cuff tear posterior inferior to the fracture    Indications:   Patient is a 14-year-old active otherwise healthy male who sustained a fall on ice. He had an acute traumatic injury where he had his left arm flexed forward and had an acute dislocation associated with the fracture above. He underwent closed reduction in the emergency room. He understood the risks, benefits, alternatives of the above operation and wanted to proceed. Procedure Details:   I marked the left shoulder as the operative site. Patient was wheeled back to the OR and transferred to the operating room table. Preoperative interscalene block was induced. General surgery was then induced. Left upper extremity was prepped and draped in standard sterile fashion. Timeout was performed. Preoperative Ancef was delivered. The positioning of the patient was such that he was upright beachchair with all bony prominences well-padded. The head was secured with the neck in neutral position without any evidence of compromise. We began with a superior lateral approach the shoulder. Made a vertical incision and incised midway through the deltoid fascia. We bluntly  the deltoid planes. We extended the incision up towards the acromion. Underlying bursal tissue was then removed. The fracture was then mobilized. We used a Watson elevator and rongeur to remove some of the prostatic tissue around the fracture to deliver the fracture ends. We then again controlled the fragments and the cuff by using too high tensile #2 tape sutures. I then mobilized the fragment up and away and identified the underlying host bone. There were several fragments within but not 1 large 1. Placed 2 anchors on the \"medial row\". These were both double loaded.   All the suture limbs were then passed through the rotator cuff starting from the supraspinatus all the way down towards the infraspinatus and teres minor. The teres minor was torn separate from the fracture fragment which was reduced via these anchors. Once the limbs were passed. A double row technique was used where 2 double row anchors were then open drilled and placed with the appropriate compression of the fragments. One limb of each of these were tied down and the other limb of these were compressed in a crisscross fashion for transosseous equivalent repair. There was still one large piece attached to the supraspinatus which did not have perfect fixation yet. There was still evidence of subluxation of the humeral head of the glenoid under fluoroscopy. But with the reduction of this fragment and stabilization, instability or laxity was significantly reduced. Therefore decision was made to place a single screw. We decided against a washer due to his reasonable bone quality and avoid future possible impingement and limitation of motion. I used a K wire followed by standard drill technique and placed a cannulated screw. Orthogonal images confirmed reasonable position of the hardware without prominence. Performed thorough irrigation. Closed deltoid fascia with Vicryl suture. Approximate subcutaneous tissue with 2-0 Vicryl followed by Monocryl for the skin. Dermabond pernio was applied. Shoulder abduction sling was applied. Patient was awakened from general anesthesia transported to PACU without complications. On 7 counts correct x2. Modifier 22: Increased time and complexity  This case took approximately 50% more time than a standard greater tuberosity fragment due to the level of comminution and in the setting of instability. We had resolved the amount of laxity present within the shoulder while obtaining enough fixation in an otherwise fragmented bone.   A combination of suture anchors and metal hardware was required to stabilize both the greater tuberosity as well as the rotator cuff. This, in no way, signifies that the ultimate outcome was compromised in any way.     PLAN:  - NWB LUE in shoulder abduction pillow  - aspirin for DVT ppx  - po pain meds  - dispo: plan for discharge today    Electronically signed by Reg Lee MD on 2/14/2022 at 3:28 PM

## 2022-02-28 DIAGNOSIS — S42.292G CLOSED 3-PART FRACTURE OF PROXIMAL HUMERUS WITH DELAYED HEALING, LEFT: ICD-10-CM

## 2022-02-28 RX ORDER — CYCLOBENZAPRINE HCL 10 MG
10 TABLET ORAL 3 TIMES DAILY PRN
Qty: 30 TABLET | Refills: 0 | Status: SHIPPED | OUTPATIENT
Start: 2022-02-28 | End: 2022-08-09 | Stop reason: SDUPTHER

## 2022-02-28 RX ORDER — OXYCODONE HYDROCHLORIDE 5 MG/1
5 TABLET ORAL EVERY 6 HOURS PRN
Qty: 28 TABLET | Refills: 0 | Status: SHIPPED | OUTPATIENT
Start: 2022-02-28 | End: 2022-03-07

## 2022-03-07 ENCOUNTER — OFFICE VISIT (OUTPATIENT)
Dept: ORTHOPEDIC SURGERY | Age: 57
End: 2022-03-07

## 2022-03-07 VITALS — HEIGHT: 72 IN | WEIGHT: 282 LBS | BODY MASS INDEX: 38.19 KG/M2

## 2022-03-07 DIAGNOSIS — Z98.890 S/P SHOULDER SURGERY: ICD-10-CM

## 2022-03-07 DIAGNOSIS — S42.252A CLOSED DISPLACED FRACTURE OF GREATER TUBEROSITY OF LEFT HUMERUS, INITIAL ENCOUNTER: Primary | ICD-10-CM

## 2022-03-07 DIAGNOSIS — S43.005A SHOULDER DISLOCATION, LEFT, INITIAL ENCOUNTER: ICD-10-CM

## 2022-03-07 PROCEDURE — 99024 POSTOP FOLLOW-UP VISIT: CPT | Performed by: PHYSICIAN ASSISTANT

## 2022-03-07 NOTE — PROGRESS NOTES
Dr Emilee Aguayo      Date /Time 3/7/2022       3:37 PM EST  Name Nam Damon             1965   Location  [facility]  MRN <[id number]>                Chief Complaint   Patient presents with    Post-Op Check     LEFT SHOULDER [address] 2/14/22        History of Present Illness      Nam Damon is a 61 y.o. male is here for post-op visit after left shoulder surgery. Patient is 3 weeks status post ORIF left shoulder greater tuberosity fracture and rotator cuff repair. Patient doing well. Pain controlled. Patient denies fever chills or drainage        Physical Exam    Based off 1997 Exam Criteria    Ht 6' (1.829 m)   Wt 282 lb (127.9 kg)   BMI 38.25 kg/m²      Constitutional:       General: He is not in acute distress. Appearance: Normal appearance. LEFT Shoulder: incision clean, intact, healing appropriately. No surrounding  erythema or fluctuance. Neuro intact distal. No evidence of DVT. Imaging       Left Shoulder: [address] [facility]  Radiographs: X-rays were ordered and reviewed of the left shoulder. 3 views. AP, scapular Y, and axillary views. They demonstrate a 1 metallic screw consistent with ORIF greater tuberosity fracture. Fracture and hardware maintaining appropriate position. Assessment and Plan    Nam was seen today for post-op check. Diagnoses and all orders for this visit:    Closed displaced fracture of greater tuberosity of left humerus, initial encounter  -     XR SHOULDER LEFT (MIN 2 VIEWS); Future    Shoulder dislocation, left, initial encounter  -     XR SHOULDER LEFT (MIN 2 VIEWS); Future    S/P shoulder surgery  -     XR SHOULDER LEFT (MIN 2 VIEWS); Future        Patient is doing well. He will continue to work on hand wrist and elbow range of motion exercises. Stay in his sling full-time. In 2 weeks he will start physical therapy for passive range of motion only.   He will continue in his sling until he follows up in the office with me in 3 weeks. Please take x-rays at that time. Hopefully we can discontinue the sling at that time. Electronically signed by Keith Barrientos PA-C on 3/7/2022 at 3:37 PM  This dictation was generated by voice recognition computer software. Although all attempts are made to edit the dictation for accuracy, there may be errors in the transcription that are not intended.

## 2022-03-21 ENCOUNTER — APPOINTMENT (OUTPATIENT)
Dept: PHYSICAL THERAPY | Age: 57
End: 2022-03-21
Payer: COMMERCIAL

## 2022-03-22 ENCOUNTER — HOSPITAL ENCOUNTER (OUTPATIENT)
Dept: PHYSICAL THERAPY | Age: 57
Setting detail: THERAPIES SERIES
Discharge: HOME | End: 2022-03-22
Payer: COMMERCIAL

## 2022-03-22 PROCEDURE — 97110 THERAPEUTIC EXERCISES: CPT | Performed by: PHYSICAL THERAPIST

## 2022-03-22 PROCEDURE — 97140 MANUAL THERAPY 1/> REGIONS: CPT | Performed by: PHYSICAL THERAPIST

## 2022-03-22 PROCEDURE — 97161 PT EVAL LOW COMPLEX 20 MIN: CPT | Performed by: PHYSICAL THERAPIST

## 2022-03-22 NOTE — PLAN OF CARE
[facility], [address]  Phone: [phone]  Fax [phone]     Physical Therapy Certification    Dear Referring Practitioner: Dimitris Alvarado,    We had the pleasure of evaluating the following patient for physical therapy services at [address] [place] [address]. A summary of our findings can be found in the initial assessment below. This includes our plan of care. If you have any questions or concerns regarding these findings, please do not hesitate to contact me at the office phone number checked above. Thank you for the referral.       Physician Signature:_______________________________Date:__________________  By signing above (or electronic signature), therapists plan is approved by physician    Patient: Deya Mendieta   : 1965   MRN: [id number]  Referring Physician: Referring Practitioner: Dimitris Alvarado      Evaluation Date: 3/22/2022      Medical Diagnosis Information:  Diagnosis: closed displaced fracture of greater tuberosity left humerus (S42.252D), left shoulder dislocation (S43.005D), s/p left shoulder ORIF and RTC repair DOS 22   Treatment Diagnosis: left shoulder pain (M25.512)                                         Insurance information: PT Insurance Information: BCBS    Precautions/ Contra-indications:  PROM only, no driving until next MD appointment  C-SSRS Triggered by Intake questionnaire (Past 2 wk assessment):   [x] No, Questionnaire did not trigger screening.   [] Yes, Patient intake triggered further evaluation      [] C-SSRS Screening completed  [] PCP notified via Plan of Care  [] Emergency services notified     Latex Allergy:  [x]NO      []YES  Preferred Language for Healthcare:   [x]English       []other:    SUBJECTIVE: Patient stated complaint: Patient reports he injured his left shoulder , when he slipped and fell on some ice.  Reports that he fractured his shoulder and tore his rotator cuff. Had surgery to repair RTC and fix fracture 2/14/22. Since the surgery, shoulder has been sore, but improving. Patient is RHD. Relevant Medical History: no significant PMH  Functional Disability Index: FOTO functional: 28/100 (28%)    Height: 6'1    Weight: 280  Pain Scale: 1-2/10  Easing factors: ice, ibuprofen  Provocative factors: moving shoulder, changing positions, sleeping     Type: []Constant   [x]Intermittent  []Radiating []Localized []other:     Numbness/Tingling: occasionally present in hand    Occupation/School: , currently off til May    Living Status/Prior Level of Function: Independent with ADLs and IADLs, lives with wife in multi-level house    OBJECTIVE:     CERV ROM     Cervical Flexion     Cervical Extension     Cervical SB     Cervical rotation          PROM Left Right   Shoulder Flex 85    Shoulder Abd 40    Shoulder ER 10 deg at 15 deg abd    Shoulder IR 15 deg at 15 deg abd                   Strength  Left Right   Shoulder Flex DNT    Shoulder Scap DNT    Shoulder ER DNT    Shoulder IR DNT                Reflexes/Sensation: DNT due to post op status   []Dermatomes/Myotomes intact    []Reflexes equal and normal bilaterally   []Other:    Joint mobility:    []Normal    [x]Hypo   []Hyper    Palpation: palpable tightness upper trap, TTP anterior shoulder    Functional Mobility/Transfers: mod I    Posture: forward head, rounded shoulders    Bandages/Dressings/Incisions: Incisions healing with no signs/symptoms of infection. Gait: (include devices/WB status): WNL    Orthopedic Special Tests: none this date                       [x] Patient history, allergies, meds reviewed. Medical chart reviewed. See intake form. Review Of Systems (ROS):  [x]Performed Review of systems (Integumentary, CardioPulmonary, Neurological) by intake and observation. Intake form has been scanned into medical record.  Patient has been instructed to contact their primary care physician regarding ROS issues if not already being addressed at this time. Co-morbidities/Complexities (which will affect course of rehabilitation):   [x]None           Arthritic conditions   []Rheumatoid arthritis (M05.9)  []Osteoarthritis (M19.91)   Cardiovascular conditions   []Hypertension (I10)  []Hyperlipidemia (E78.5)  []Angina pectoris (I20)  []Atherosclerosis (I70)   Musculoskeletal conditions   []Disc pathology   []Congenital spine pathologies   []Prior surgical intervention  []Osteoporosis (M81.8)  []Osteopenia (M85.8)   Endocrine conditions   []Hypothyroid (E03.9)  []Hyperthyroid Gastrointestinal conditions   []Constipation (S70.39)   Metabolic conditions   []Morbid obesity (E66.01)  []Diabetes type 1(E10.65) or 2 (E11.65)   []Neuropathy (G60.9)     Pulmonary conditions   []Asthma (J45)  []Coughing   []COPD (J44.9)   Psychological Disorders  []Anxiety (F41.9)  []Depression (F32.9)   []Other:   []Other:          Barriers to/and or personal factors that will affect rehab potential:              [x]Age  []Sex              []Motivation/Lack of Motivation                        [x]Co-Morbidities              []Cognitive Function, education/learning barriers              []Environmental, home barriers              []profession/work barriers  []past PT/medical experience  []other:  Justification:      Falls Risk Assessment (30 days):   [x] Falls Risk assessed and no intervention required. [] Falls Risk assessed and Patient requires intervention due to being higher risk   TUG score (>12s at risk):     [] Falls education provided, including       ASSESSMENT: Patient demonstrates decreased ROM and strength in his left shoulder s/p ORIF and RTC repair, limiting his ability to complete ADLs and work as a  without pain. WIll benefit from skilled PT to address limitations.     Functional Impairments   [x]Noted spinal or UE joint hypomobility   []Noted spinal or UE joint hypermobility   [x]Decreased UE functional ROM   [x]Decreased UE functional strength   []Abnormal reflexes/sensation/myotomal/dermatomal deficits   [x]Decreased RC/scapular/core strength and neuromuscular control   []other:      Functional Activity Limitations (from functional questionnaire and intake)   []Reduced ability to tolerate prolonged functional positions   [x]Reduced ability or difficulty with changes of positions or transfers between positions   [x]Reduced ability to maintain good posture and demonstrate good body mechanics with sitting, bending, and lifting   [x] Reduced ability or tolerance with driving and/or computer work   [x]Reduced ability to sleep   [x]Reduced ability to perform lifting, reaching, carrying tasks   [x]Reduced ability to tolerate impact through UE   [x]Reduced ability to reach behind back   []Reduced ability to  or hold objects   [x]Reduced ability to throw or toss an object   []other:    Participation Restrictions   []Reduced participation in self care activities   [x]Reduced participation in home management activities   [x]Reduced participation in work activities   []Reduced participation in social activities. [x]Reduced participation in sport/recreation activities. Classification:   [x]Signs/symptoms consistent with post-surgical status including decreased ROM, strength and function.   []Signs/symptoms consistent with joint sprain/strain   []Signs/symptoms consistent with shoulder impingement   []Signs/symptoms consistent with shoulder/elbow/wrist tendinopathy   []Signs/symptoms consistent with Rotator cuff tear   []Signs/symptoms consistent with labral tear   []Signs/symptoms consistent with postural dysfunction    []Signs/symptoms consistent with Glenohumeral IR Deficit - <45 degrees   []Signs/symptoms consistent with facet dysfunction of cervical/thoracic spine    []Signs/symptoms consistent with pathology which may benefit from Dry needling     []other:     Prognosis/Rehab Potential: []Excellent   [x]Good    []Fair   []Poor    Tolerance of evaluation/treatment:    []Excellent   [x]Good    []Fair   []Poor  Physical Therapy Evaluation Complexity Justification  [x] A history of present problem with:  [x] no personal factors and/or comorbidities that impact the plan of care;  []1-2 personal factors and/or comorbidities that impact the plan of care  []3 personal factors and/or comorbidities that impact the plan of care  [x] An examination of body systems using standardized tests and measures addressing any of the following: body structures and functions (impairments), activity limitations, and/or participation restrictions;:  [] a total of 1-2 or more elements   [] a total of 3 or more elements   [x] a total of 4 or more elements   [x] A clinical presentation with:  [x] stable and/or uncomplicated characteristics   [] evolving clinical presentation with changing characteristics  [] unstable and unpredictable characteristics;   [x] Clinical decision making of [x] low, [] moderate, [] high complexity using standardized patient assessment instrument and/or measurable assessment of functional outcome. [x] EVAL (LOW) 15355 (typically 20 minutes face-to-face)  [] EVAL (MOD) 03287 (typically 30 minutes face-to-face)  [] EVAL (HIGH) 56371 (typically 45 minutes face-to-face)  [] RE-EVAL       PLAN:  Frequency/Duration:  1-2 days per week for 12 Weeks:  INTERVENTIONS:  [x] Therapeutic exercise including: strength training, ROM, for Upper extremity and core   [x]  NMR activation and proprioception for UE, scap and Core   [x] Manual therapy as indicated for shoulder, scapula and spine to include: Dry Needling/IASTM, STM, PROM, Gr I-IV mobilizations, manipulation. [x] Modalities as needed that may include: thermal agents, E-stim, Biofeedback, US, iontophoresis as indicated  [x] Patient education on joint protection, postural re-education, activity modification, progression of HEP.     HEP instruction: Access Code: [email or web address]  URL: [email or web address]  Date: 03/22/2022  Prepared by: Brian Patel    GOALS:  Patient stated goal: \"Full motion of shoulder and get back to work. \"  [] Progressing: [] Met: [] Not Met: [] Adjusted    Therapist goals for Patient:   Short Term Goals: To be achieved in: 2 weeks  1. Independent in HEP and progression per patient tolerance, in order to prevent re-injury. [] Progressing: [] Met: [] Not Met: [] Adjusted  2. Patient will have a decrease in pain to facilitate improvement in movement, function, and ADLs as indicated by Functional Deficits. [] Progressing: [] Met: [] Not Met: [] Adjusted    Long Term Goals: To be achieved in: 12 weeks  1. Disability index score of 65% or more for the FOTO functional questionnaire to assist with reaching prior level of function. [] Progressing: [] Met: [] Not Met: [] Adjusted  2. Patient will demonstrate increased AROM of left shoulder flexion and abduction to 145, functional ER to T2, IR to T10 to allow for proper joint functioning as indicated by patients Functional Deficits. [] Progressing: [] Met: [] Not Met: [] Adjusted  3. Patient will demonstrate an increase in Strength in left shoulder to grossly 4+/5 to allow for proper functional mobility as indicated by patients Functional Deficits. [] Progressing: [] Met: [] Not Met: [] Adjusted  4. Patient will be able to drive without increased symptoms or restriction. [] Progressing: [] Met: [] Not Met: [] Adjusted  5. Patient will be able to lift a 5 pound object above shoulder height without increased symptoms or restriction in his left shoulder. [] Progressing: [] Met: [] Not Met: [] Adjusted  6.  Patient will be able to return to work as a  without increased symptoms or restrictions in his left shoulder.(patient specific functional goal)    [] Progressing: [] Met: [] Not Met: [] Adjusted       Electronically signed by:  Semaj Marino, DPT [id number]

## 2022-03-28 ENCOUNTER — OFFICE VISIT (OUTPATIENT)
Dept: ORTHOPEDIC SURGERY | Age: 57
End: 2022-03-28

## 2022-03-28 VITALS — HEIGHT: 72 IN | BODY MASS INDEX: 38.19 KG/M2 | WEIGHT: 282 LBS

## 2022-03-28 DIAGNOSIS — S42.252A CLOSED DISPLACED FRACTURE OF GREATER TUBEROSITY OF LEFT HUMERUS, INITIAL ENCOUNTER: ICD-10-CM

## 2022-03-28 DIAGNOSIS — Z98.890 S/P SHOULDER SURGERY: Primary | ICD-10-CM

## 2022-03-28 DIAGNOSIS — S42.292G CLOSED 3-PART FRACTURE OF PROXIMAL HUMERUS WITH DELAYED HEALING, LEFT: ICD-10-CM

## 2022-03-28 PROCEDURE — 99024 POSTOP FOLLOW-UP VISIT: CPT | Performed by: PHYSICIAN ASSISTANT

## 2022-03-28 NOTE — PROGRESS NOTES
Dr Rach Yuan      Date /Time 3/28/2022       3:37 PM EST  Name Nam Henderson             1965   Location  [address] [place] [address] DR Stefan Arrieta  MRN [id number]                Chief Complaint   Patient presents with    Follow-up     ORIF Left Proximal humerus 02/14/2022        History of Present Illness      Nam Henderson is a 61 y.o. male is here for post-op visit after left shoulder surgery. Patient is 3 weeks status post ORIF left shoulder greater tuberosity fracture and rotator cuff repair. Patient doing well. Pain controlled. Patient denies fever chills or drainage        Physical Exam    Based off 1997 Exam Criteria    Ht 6' (1.829 m)   Wt 282 lb (127.9 kg)   BMI 38.25 kg/m²      Constitutional:       General: He is not in acute distress. Appearance: Normal appearance. LEFT Shoulder: incision clean, intact, healing appropriately. No surrounding  erythema or fluctuance. Neuro intact distal. No evidence of DVT. Imaging       Left Shoulder: [facility]  Radiographs: X-rays were ordered and reviewed of the left shoulder. 3 views. AP, scapular Y, and axillary views. They demonstrate a 1 metallic screw consistent with ORIF greater tuberosity fracture. Fracture and hardware maintaining appropriate position. Assessment and Plan    Nam was seen today for follow-up. Diagnoses and all orders for this visit:    S/P shoulder surgery  -     XR SHOULDER LEFT (MIN 2 VIEWS); Future    Closed 3-part fracture of proximal humerus with delayed healing, left  -     XR SHOULDER LEFT (MIN 2 VIEWS); Future    Closed displaced fracture of greater tuberosity of left humerus, initial encounter  -     XR SHOULDER LEFT (MIN 2 VIEWS); Future        Patient is doing well. Patient will continue her physical therapy. He can now discontinue sling. We will see the patient back in 2 months or sooner problems arise. He will follow the rotator cuff rehab protocol at this time.   He will remain off of work. Electronically signed by Cierra Davis PA-C on 3/28/2022 at 3:06 PM  This dictation was generated by voice recognition computer software. Although all attempts are made to edit the dictation for accuracy, there may be errors in the transcription that are not intended.

## 2022-03-29 ENCOUNTER — HOSPITAL ENCOUNTER (OUTPATIENT)
Dept: PHYSICAL THERAPY | Age: 57
Setting detail: THERAPIES SERIES
Discharge: HOME | End: 2022-03-29
Payer: COMMERCIAL

## 2022-03-29 PROCEDURE — 97110 THERAPEUTIC EXERCISES: CPT | Performed by: PHYSICAL THERAPIST

## 2022-03-29 PROCEDURE — 97140 MANUAL THERAPY 1/> REGIONS: CPT | Performed by: PHYSICAL THERAPIST

## 2022-03-29 NOTE — FLOWSHEET NOTE
[facility],  [address] [place]  Phone: [phone]  Fax [phone]        Date:  3/29/2022    Patient Name:  Lena Hernandez    :  1965  MRN: [id number]  Restrictions/Precautions:    Medical/Treatment Diagnosis Information:  · Diagnosis: closed displaced fracture of greater tuberosity left humerus (S42.252D), left shoulder dislocation (S43.005D), s/p left shoulder ORIF and RTC repair DOS 22  · Treatment Diagnosis: left shoulder pain (L89.446)  Insurance/Certification information:  PT Insurance Information: BC  Physician Information:  Referring Practitioner: Yusuf Araiza  Has the plan of care been signed (Y/N):        []  Yes  [x]  No     Date of Patient follow up with Physician: 3/28/22      Is this a Progress Report:     []  Yes  [x]  No        If Yes:  Date Range for reporting period:  Beginning: 3/22/22  Ending    Progress report will be due (10 Rx or 30 days whichever is less): 3/85/04       Recertification will be due (POC Duration  / 90 days whichever is less):       Visit # Insurance Allowable Auth Required   In-person 2  []  Yes []  No    Telehealth   []  Yes []  No    Total            Functional Scale:  FOTO functional questionnaire 28/100 (28%)    Date assessed:  3/22/22      Therapy Diagnosis/Practice Pattern: I      Number of Comorbidities:  [x]0     []1-2    []3+    Latex Allergy:  [x]NO      []YES  Preferred Language for Healthcare:   [x]English       []other:      Pain level:  1-2/10     SUBJECTIVE:  Patient reports his shoulder is feeling pretty good. Not having a lot of pain. Saw MD who said he can get rid of the sling. Anxious to get back to work as soon as possible.     OBJECTIVE: See eval   Observation:    Test measurements:  NT this date    RESTRICTIONS/PRECAUTIONS: PROM only, no driving    Exercises/Interventions:     Therapeutic Ex (61769) Sets/sec Reps Notes/CUES   Scap squeezes 3\" 1 x 10    Supine cane ER 5\" 10x    Supine cane press AAROM 5\" 10x    Table slides flexion 10\" 10x Forward; cueing to avoid use of LUE         Upper trap stretch 30\" 3x ea B    RTB rows 3\" 2 x 10 Needs cueing   Finisher 3D, circles 1 10x ea          Belt IR stretch 10\" 10x gentle               Manual Intervention (68790)      Oscillations for pain, GH Joint mobilizations Gr II/III 4'     PROM left shoulder flexion, abduction, ER 8'     STM pec 3'                       NMR re-education (33596)   CUES NEEDED                                                         Therapeutic Activity (79940)                                          HEP instruction:   Access Code: [email or web address]  URL: [email or web address]  Date: 03/22/2022  Prepared by: Shanita Monroy        Therapeutic Exercise and NMR EXR  [x] (22035) Provided verbal/tactile cueing for activities related to strengthening, flexibility, endurance, ROM  for improvements in scapular, scapulothoracic and UE control with self care, reaching, carrying, lifting, house/yardwork, driving/computer work. [x] (44843) Provided verbal/tactile cueing for activities related to improving balance, coordination, kinesthetic sense, posture, motor skill, proprioception  to assist with  scapular, scapulothoracic and UE control with self care, reaching, carrying, lifting, house/yardwork, driving/computer work. Therapeutic Activities:    [] (17820 or 04242) Provided verbal/tactile cueing for activities related to improving balance, coordination, kinesthetic sense, posture, motor skill, proprioception and motor activation to allow for proper function of scapular, scapulothoracic and UE control with self care, carrying, lifting, driving/computer work.      Home Exercise Program:    [x] (76331) Reviewed/Progressed HEP activities related to strengthening, flexibility, endurance, ROM of scapular, scapulothoracic and UE control with self care, reaching, carrying, lifting, house/yardwork, driving/computer work  [] (81069) Reviewed/Progressed HEP activities related to improving balance, coordination, kinesthetic sense, posture, motor skill, proprioception of scapular, scapulothoracic and UE control with self care, reaching, carrying, lifting, house/yardwork, driving/computer work      Manual Treatments:  PROM / STM / Oscillations-Mobs:  G-I, II, III, IV (PA's, Inf., Post.)  [x] (89233) Provided manual therapy to mobilize soft tissue/joints of cervical/CT, scapular GHJ and UE for the purpose of modulating pain, promoting relaxation,  increasing ROM, reducing/eliminating soft tissue swelling/inflammation/restriction, improving soft tissue extensibility and allowing for proper ROM for normal function with self care, reaching, carrying, lifting, house/yardwork, driving/computer work    Modalities:  Declined. Will ice at home    Charges  Timed Code Treatment Minutes: 40'   Total Treatment Minutes: 36'     [] EVAL (LOW) 32443   [] EVAL (MOD) 08914   [] EVAL (HIGH) 05901   [] RE-EVAL     [x] ZW(75800) x   2  [] IONTO  [] NMR (33970) x     [] VASO  [x] Manual (29262) x   1  [] Other:  [] TA x      [] Mech Traction (04806)  [] ES(attended) (98024)      [] ES (un) (56022):       GOALS:  Patient stated goal: \"Full motion of shoulder and get back to work. \"  [] Progressing: [] Met: [] Not Met: [] Adjusted    Therapist goals for Patient:   Short Term Goals: To be achieved in: 2 weeks  1. Independent in HEP and progression per patient tolerance, in order to prevent re-injury. [] Progressing: [] Met: [] Not Met: [] Adjusted  2. Patient will have a decrease in pain to facilitate improvement in movement, function, and ADLs as indicated by Functional Deficits. [] Progressing: [] Met: [] Not Met: [] Adjusted    Long Term Goals: To be achieved in: 12 weeks  1. Disability index score of 65% or more for the FOTO functional questionnaire to assist with reaching prior level of function.    [] Progressing: [] Met: [] Not Met: [] Adjusted  2. Patient will demonstrate increased AROM of left shoulder flexion and abduction to 145, functional ER to T2, IR to T10 to allow for proper joint functioning as indicated by patients Functional Deficits. [] Progressing: [] Met: [] Not Met: [] Adjusted  3. Patient will demonstrate an increase in Strength in left shoulder to grossly 4+/5 to allow for proper functional mobility as indicated by patients Functional Deficits. [] Progressing: [] Met: [] Not Met: [] Adjusted  4. Patient will be able to drive without increased symptoms or restriction. [] Progressing: [] Met: [] Not Met: [] Adjusted  5. Patient will be able to lift a 5 pound object above shoulder height without increased symptoms or restriction in his left shoulder. [] Progressing: [] Met: [] Not Met: [] Adjusted  6. Patient will be able to return to work as a  without increased symptoms or restrictions in his left shoulder.(patient specific functional goal)    [] Progressing: [] Met: [] Not Met: [] Adjusted     Progression Towards Functional goals:  [] Patient is progressing as expected towards functional goals listed. [] Progression is slowed due to complexities listed. [] Progression has been slowed due to co-morbidities. [x] Plan just implemented, too soon to assess goals progression  [] Other:     ASSESSMENT:  Patient with improvements in PROM as compared to initial visit. Tolerated AAROM well. Discussed protocol restrictions today with patient including staying in \"safe zone\" and no lifting at this time. Overall Progression Towards Functional goals/ Treatment Progress Update:  [] Patient is progressing as expected towards functional goals listed. [] Progression is slowed due to complexities/Impairments listed. [] Progression has been slowed due to co-morbidities.   [x] Plan just implemented, too soon to assess goals progression <30days   [] Goals require adjustment due to lack of progress  [] Patient is not progressing as expected and requires additional follow up with physician  [] Other    Prognosis for POC: [x] Good [] Fair  [] Poor      Patient requires continued skilled intervention: [x] Yes  [] No    Treatment/Activity Tolerance:  [x] Patient able to complete treatment  [] Patient limited by fatigue  [] Patient limited by pain    [] Patient limited by other medical complications  [] Other:         PLAN: See eval  [x] Continue per plan of care [] Alter current plan (see comments above)  [] Plan of care initiated [] Hold pending MD visit [] Discharge      Electronically signed by:  Sully Sarmietno, PT, DPT [id number]     Note: If patient does not return for scheduled/ recommended follow up visits, this note will serve as a discharge from care along with most recent update on progress.

## 2022-03-31 ENCOUNTER — HOSPITAL ENCOUNTER (OUTPATIENT)
Dept: PHYSICAL THERAPY | Age: 57
Setting detail: THERAPIES SERIES
Discharge: HOME | End: 2022-03-31
Payer: COMMERCIAL

## 2022-03-31 PROCEDURE — 97110 THERAPEUTIC EXERCISES: CPT | Performed by: PHYSICAL THERAPIST

## 2022-03-31 PROCEDURE — 97140 MANUAL THERAPY 1/> REGIONS: CPT | Performed by: PHYSICAL THERAPIST

## 2022-03-31 NOTE — FLOWSHEET NOTE
[facility],  [address] [place]  Phone: [phone]  Fax [phone]        Date:  3/31/2022    Patient Name:  Artemio Kimball    :  1965  MRN: [id number]  Restrictions/Precautions:    Medical/Treatment Diagnosis Information:  · Diagnosis: closed displaced fracture of greater tuberosity left humerus (S42.252D), left shoulder dislocation (S43.005D), s/p left shoulder ORIF and RTC repair DOS 22  · Treatment Diagnosis: left shoulder pain (U23.125)  Insurance/Certification information:  PT Insurance Information: [employer]  Physician Information:  Referring Practitioner: Rudolph Morales  Has the plan of care been signed (Y/N):        [x]  Yes  []  No     Date of Patient follow up with Physician: 3/28/22      Is this a Progress Report:     []  Yes  [x]  No        If Yes:  Date Range for reporting period:  Beginning: 3/22/22  Ending    Progress report will be due (10 Rx or 30 days whichever is less):        Recertification will be due (POC Duration  / 90 days whichever is less):       Visit # Insurance Allowable Auth Required   In-person 3  []  Yes []  No    Telehealth   []  Yes []  No    Total            Functional Scale:  FOTO functional questionnaire 28/100 (28%)    Date assessed:  3/22/22      Therapy Diagnosis/Practice Pattern: I      Number of Comorbidities:  [x]0     []1-2    []3+    Latex Allergy:  [x]NO      []YES  Preferred Language for Healthcare:   [x]English       []other:      Pain level:  0-3/10     SUBJECTIVE:  Patient reports his shoulder is doing well. Not having too much pain. OBJECTIVE:   Observation:    Test measurements:  PROM ER 25 deg at 10 deg abd, PROM flexion 92 deg    RESTRICTIONS/PRECAUTIONS: Clarified with Dr. Rudolph Morales this date as protocol and MD note state conflicting restrictions  Phase I: First 4 weeks of PT (to being 4-6 weeks after surgery):  UNTIL   Patient education: posture, joint protection, positioning, and hygiene   No active movement of the shoulder   No pendulums   Elbow, wrist, and hand AROM without weigh  Only PROM of elbow if biceps tenodesis was performedPassive forward elevation to 90 degrees   Passive external rotation (elbow at side) to 30 degrees   Only PROM activities with low rotator cuff EMG activity (i.e. no pulleys, cane ex, or self PROM)   Begin active and manual scapula strengthening exercises GOAL:PROM with minimal pain. No strengthening. Exercises/Interventions:     Therapeutic Ex (20596) Sets/sec Reps Notes/CUES   Scap squeezes 3\" 2 x 10    Supine cane ER 5\" 10x          Table slides flexion 10\" 10x Forward; < 90 deg         Upper trap stretch 30\" 3x ea B       Needs cueing               Patient ed   Clarifying restrictions/precautions, HEP review, POC, ice, healing process, return to work timeline per MD               Manual Intervention ([id number])      Oscillations for pain, [address] Joint mobilizations Gr III 5'     PROM left shoulder flexion, abduction, ER 15     STM pec, upper trap 5'                       NMR re-education (13187)   CUES NEEDED                                                         Therapeutic Activity (95576)                                          HEP instruction:   Access Code: [email or web address]  URL: [email or web address]  Date: 03/22/2022  Prepared by: Beryle Lites        Therapeutic Exercise and NMR EXR  [x] (35304) Provided verbal/tactile cueing for activities related to strengthening, flexibility, endurance, ROM  for improvements in scapular, scapulothoracic and UE control with self care, reaching, carrying, lifting, house/yardwork, driving/computer work.     [x] (91182) Provided verbal/tactile cueing for activities related to improving balance, coordination, kinesthetic sense, posture, motor skill, proprioception  to assist with  scapular, scapulothoracic and UE control with self care, reaching, carrying, lifting, house/yardwork, driving/computer work. Therapeutic Activities:    [] (90681 or 27668) Provided verbal/tactile cueing for activities related to improving balance, coordination, kinesthetic sense, posture, motor skill, proprioception and motor activation to allow for proper function of scapular, scapulothoracic and UE control with self care, carrying, lifting, driving/computer work. Home Exercise Program:    [x] (90637) Reviewed/Progressed HEP activities related to strengthening, flexibility, endurance, ROM of scapular, scapulothoracic and UE control with self care, reaching, carrying, lifting, house/yardwork, driving/computer work  [] (78751) Reviewed/Progressed HEP activities related to improving balance, coordination, kinesthetic sense, posture, motor skill, proprioception of scapular, scapulothoracic and UE control with self care, reaching, carrying, lifting, house/yardwork, driving/computer work      Manual Treatments:  PROM / STM / Oscillations-Mobs:  G-I, II, III, IV (PA's, Inf., Post.)  [x] (28620) Provided manual therapy to mobilize soft tissue/joints of cervical/CT, scapular GHJ and UE for the purpose of modulating pain, promoting relaxation,  increasing ROM, reducing/eliminating soft tissue swelling/inflammation/restriction, improving soft tissue extensibility and allowing for proper ROM for normal function with self care, reaching, carrying, lifting, house/yardwork, driving/computer work    Modalities:  Declined. Will ice at home    Charges  Timed Code Treatment Minutes: 45'   Total Treatment Minutes: 45'     [] EVAL (LOW) 18497   [] EVAL (MOD) 82408   [] EVAL (HIGH) 24642   [] RE-EVAL     [x] UD(28382) x   1  [] IONTO  [] NMR (48892) x     [] VASO  [x] Manual (95249) x   2  [] Other:  [] TA x      [] Mech Traction (46391)  [] ES(attended) (32581)      [] ES (un) (29823):       GOALS:  Patient stated goal: \"Full motion of shoulder and get back to work. \"  [] Progressing: [] Met: [] Not Met: [] Adjusted    Therapist goals for Patient:   Short Term Goals: To be achieved in: 2 weeks  1. Independent in HEP and progression per patient tolerance, in order to prevent re-injury. [] Progressing: [x] Met: [] Not Met: [] Adjusted  2. Patient will have a decrease in pain to facilitate improvement in movement, function, and ADLs as indicated by Functional Deficits. [] Progressing: [x] Met: [] Not Met: [] Adjusted    Long Term Goals: To be achieved in: 12 weeks  1. Disability index score of 65% or more for the FOTO functional questionnaire to assist with reaching prior level of function. [] Progressing: [] Met: [] Not Met: [] Adjusted  2. Patient will demonstrate increased AROM of left shoulder flexion and abduction to 145, functional ER to T2, IR to T10 to allow for proper joint functioning as indicated by patients Functional Deficits. [] Progressing: [] Met: [] Not Met: [] Adjusted  3. Patient will demonstrate an increase in Strength in left shoulder to grossly 4+/5 to allow for proper functional mobility as indicated by patients Functional Deficits. [] Progressing: [] Met: [] Not Met: [] Adjusted  4. Patient will be able to drive without increased symptoms or restriction. [] Progressing: [] Met: [] Not Met: [] Adjusted  5. Patient will be able to lift a 5 pound object above shoulder height without increased symptoms or restriction in his left shoulder. [] Progressing: [] Met: [] Not Met: [] Adjusted  6. Patient will be able to return to work as a  without increased symptoms or restrictions in his left shoulder.(patient specific functional goal)    [] Progressing: [] Met: [] Not Met: [] Adjusted     Progression Towards Functional goals:  [] Patient is progressing as expected towards functional goals listed. [] Progression is slowed due to complexities listed. [] Progression has been slowed due to co-morbidities.   [x] Plan just implemented, too soon to assess goals progression  [] Other:     ASSESSMENT:  Discussed protocol with MD this date, who stated patient is to be PROM only the first 4-6 weeks of PT (until 8 weeks post op). Patient can be out of the sling at this time, but is to avoid AROM of the shoulder. Patient is allowed to drive at this time. Due to nature of patient's job, can return to work 5-6 months pos op. Patient to avoid all strengthening exercises and activities at this time. Patient's HEP reduced back to PROM only. Patient in agreement with POC. Overall Progression Towards Functional goals/ Treatment Progress Update:  [] Patient is progressing as expected towards functional goals listed. [] Progression is slowed due to complexities/Impairments listed. [] Progression has been slowed due to co-morbidities. [x] Plan just implemented, too soon to assess goals progression <30days   [] Goals require adjustment due to lack of progress  [] Patient is not progressing as expected and requires additional follow up with physician  [] Other    Prognosis for POC: [x] Good [] Fair  [] Poor      Patient requires continued skilled intervention: [x] Yes  [] No    Treatment/Activity Tolerance:  [x] Patient able to complete treatment  [] Patient limited by fatigue  [] Patient limited by pain    [] Patient limited by other medical complications  [] Other:         PLAN: See eval  [x] Continue per plan of care [] Alter current plan (see comments above)  [] Plan of care initiated [] Hold pending MD visit [] Discharge      Electronically signed by:  Mark Almodovar PT, DPT [id number]     Note: If patient does not return for scheduled/ recommended follow up visits, this note will serve as a discharge from care along with most recent update on progress.

## 2022-04-05 ENCOUNTER — HOSPITAL ENCOUNTER (OUTPATIENT)
Dept: PHYSICAL THERAPY | Age: 57
Setting detail: THERAPIES SERIES
Discharge: HOME | End: 2022-04-05
Payer: COMMERCIAL

## 2022-04-05 PROCEDURE — 97140 MANUAL THERAPY 1/> REGIONS: CPT | Performed by: PHYSICAL THERAPIST

## 2022-04-05 PROCEDURE — 97110 THERAPEUTIC EXERCISES: CPT | Performed by: PHYSICAL THERAPIST

## 2022-04-05 NOTE — FLOWSHEET NOTE
[facility],  [address]  Phone: [phone]  Fax [phone]        Date:  2022    Patient Name:  Nicole Robles    :  1965  MRN: [id number]  Restrictions/Precautions:    Medical/Treatment Diagnosis Information:  · Diagnosis: closed displaced fracture of greater tuberosity left humerus (S42.252D), left shoulder dislocation (S43.005D), s/p left shoulder ORIF and RTC repair DOS 22  · Treatment Diagnosis: left shoulder pain (Y47.665)  Insurance/Certification information:  PT Insurance Information: [facility]  Physician Information:  Referring Practitioner: Eva Hassan  Has the plan of care been signed (Y/N):        [x]  Yes  []  No     Date of Patient follow up with Physician: 3/28/22      Is this a Progress Report:     []  Yes  [x]  No        If Yes:  Date Range for reporting period:  Beginning: 3/22/22  Ending    Progress report will be due (10 Rx or 30 days whichever is less):        Recertification will be due (POC Duration  / 90 days whichever is less):       Visit # Insurance Allowable Auth Required   In-person 4  []  Yes []  No    Telehealth   []  Yes []  No    Total            Functional Scale:  FOTO functional questionnaire 28100 (28%)    Date assessed:  3/22/22      Therapy Diagnosis/Practice Pattern: I      Number of Comorbidities:  [x]0     []1-2    []3+    Latex Allergy:  [x]NO      []YES  Preferred Language for Healthcare:   [x]English       []other:      Pain level:  0-2/10     SUBJECTIVE:  Patient reports his shoulder feels pretty good overall. Mores soreness than pain. Had to carry his dog at the vet, which made his shoulder sore.      OBJECTIVE:   Observation:    Test measurements:  PROM 30 deg with arm at side, PROM flexion 95 deg    RESTRICTIONS/PRECAUTIONS: Clarified with Dr. Eva Hassan this date as protocol and MD note state conflicting restrictions  Phase I: First 4 weeks of PT (to being 4-6 weeks after surgery): UNTIL 4/11  Patient education: posture, joint protection, positioning, and hygiene   No active movement of the shoulder   No pendulums   Elbow, wrist, and hand AROM without weigh  Only PROM of elbow if biceps tenodesis was performed  Passive forward elevation to 90 degrees   Passive external rotation (elbow at side) to 30 degrees   Only PROM activities with low rotator cuff EMG activity (i.e. no pulleys, cane ex, or self PROM)   Begin active and manual scapula strengthening exercises   GOAL:PROM with minimal pain. No strengthening. Exercises/Interventions:     Therapeutic Ex (27795) Sets/sec Reps Notes/CUES   Scap squeezes 3\" 2 x 15    Supine cane ER 5\" 10x          Table slides flexion 10\" 10x Forward; < 90 deg         Upper trap stretch 30\" 3x ea B                      Patient ed   Precautions/restrictions, continuing 1x/wk for 1-2 more weeks                Manual Intervention (95234)      Oscillations for pain, GH Joint mobilizations Gr III 5'     PROM left shoulder flexion, abduction, ER 12'     STM pec, upper trap 8'                       NMR re-education (47343)   CUES NEEDED                                                         Therapeutic Activity (88332)                                          HEP instruction:   Access Code: [email or web address]  URL: [email or web address]  Date: 03/22/2022  Prepared by: Annie Stewart        Therapeutic Exercise and NMR EXR  [x] (79840) Provided verbal/tactile cueing for activities related to strengthening, flexibility, endurance, ROM  for improvements in scapular, scapulothoracic and UE control with self care, reaching, carrying, lifting, house/yardwork, driving/computer work.     [x] (61340) Provided verbal/tactile cueing for activities related to improving balance, coordination, kinesthetic sense, posture, motor skill, proprioception  to assist with  scapular, scapulothoracic and UE control with self care, reaching, carrying, lifting, house/yardwork, driving/computer work. Therapeutic Activities:    [] (56107 or 19590) Provided verbal/tactile cueing for activities related to improving balance, coordination, kinesthetic sense, posture, motor skill, proprioception and motor activation to allow for proper function of scapular, scapulothoracic and UE control with self care, carrying, lifting, driving/computer work. Home Exercise Program:    [x] (49366) Reviewed/Progressed HEP activities related to strengthening, flexibility, endurance, ROM of scapular, scapulothoracic and UE control with self care, reaching, carrying, lifting, house/yardwork, driving/computer work  [] (57483) Reviewed/Progressed HEP activities related to improving balance, coordination, kinesthetic sense, posture, motor skill, proprioception of scapular, scapulothoracic and UE control with self care, reaching, carrying, lifting, house/yardwork, driving/computer work      Manual Treatments:  PROM / STM / Oscillations-Mobs:  G-I, II, III, IV (PA's, Inf., Post.)  [x] (43270) Provided manual therapy to mobilize soft tissue/joints of cervical/CT, scapular GHJ and UE for the purpose of modulating pain, promoting relaxation,  increasing ROM, reducing/eliminating soft tissue swelling/inflammation/restriction, improving soft tissue extensibility and allowing for proper ROM for normal function with self care, reaching, carrying, lifting, house/yardwork, driving/computer work    Modalities:  Declined. Will ice at home    Charges  Timed Code Treatment Minutes: 45'   Total Treatment Minutes: 45'     [] EVAL (LOW) 32241   [] EVAL (MOD) 31918   [] EVAL (HIGH) 64600   [] RE-EVAL     [x] TV(88079) x   1  [] IONTO  [] NMR (19259) x     [] VASO  [x] Manual (88003) x   2  [] Other:  [] TA x      [] Mech Traction (58611)  [] ES(attended) (48100)      [] ES (un) (12021):       GOALS:  Patient stated goal: \"Full motion of shoulder and get back to work. \"  [] Progressing: [] Met: [] Not Met: [] Adjusted    Therapist goals for Patient:   Short Term Goals: To be achieved in: 2 weeks  1. Independent in HEP and progression per patient tolerance, in order to prevent re-injury. [] Progressing: [x] Met: [] Not Met: [] Adjusted  2. Patient will have a decrease in pain to facilitate improvement in movement, function, and ADLs as indicated by Functional Deficits. [] Progressing: [x] Met: [] Not Met: [] Adjusted    Long Term Goals: To be achieved in: 12 weeks  1. Disability index score of 65% or more for the FOTO functional questionnaire to assist with reaching prior level of function. [] Progressing: [] Met: [] Not Met: [] Adjusted  2. Patient will demonstrate increased AROM of left shoulder flexion and abduction to 145, functional ER to T2, IR to T10 to allow for proper joint functioning as indicated by patients Functional Deficits. [] Progressing: [] Met: [] Not Met: [] Adjusted  3. Patient will demonstrate an increase in Strength in left shoulder to grossly 4+/5 to allow for proper functional mobility as indicated by patients Functional Deficits. [] Progressing: [] Met: [] Not Met: [] Adjusted  4. Patient will be able to drive without increased symptoms or restriction. [] Progressing: [] Met: [] Not Met: [] Adjusted  5. Patient will be able to lift a 5 pound object above shoulder height without increased symptoms or restriction in his left shoulder. [] Progressing: [] Met: [] Not Met: [] Adjusted  6. Patient will be able to return to work as a  without increased symptoms or restrictions in his left shoulder.(patient specific functional goal)    [] Progressing: [] Met: [] Not Met: [] Adjusted     Progression Towards Functional goals:  [] Patient is progressing as expected towards functional goals listed. [] Progression is slowed due to complexities listed. [] Progression has been slowed due to co-morbidities.   [x] Plan just implemented, too soon to assess goals progression  [] Other: ASSESSMENT:  Patient with tightness noted in upper trap and pec. Joint restrictions noted with posterior mobilizations. Progressing well within protocol restrictions. Educated on avoiding active movement and resistance of LUE, including avoiding lifting activities. Overall Progression Towards Functional goals/ Treatment Progress Update:  [] Patient is progressing as expected towards functional goals listed. [] Progression is slowed due to complexities/Impairments listed. [] Progression has been slowed due to co-morbidities. [x] Plan just implemented, too soon to assess goals progression <30days   [] Goals require adjustment due to lack of progress  [] Patient is not progressing as expected and requires additional follow up with physician  [] Other    Prognosis for POC: [x] Good [] Fair  [] Poor      Patient requires continued skilled intervention: [x] Yes  [] No    Treatment/Activity Tolerance:  [x] Patient able to complete treatment  [] Patient limited by fatigue  [] Patient limited by pain    [] Patient limited by other medical complications  [] Other:         PLAN: See eval  [x] Continue per plan of care [] Alter current plan (see comments above)  [] Plan of care initiated [] Hold pending MD visit [] Discharge      Electronically signed by:  Erma Rashid, PT, DPT [id number]     Note: If patient does not return for scheduled/ recommended follow up visits, this note will serve as a discharge from care along with most recent update on progress.

## 2022-04-07 ENCOUNTER — APPOINTMENT (OUTPATIENT)
Dept: PHYSICAL THERAPY | Age: 57
End: 2022-04-07
Payer: COMMERCIAL

## 2022-04-12 ENCOUNTER — HOSPITAL ENCOUNTER (OUTPATIENT)
Dept: PHYSICAL THERAPY | Age: 57
Setting detail: THERAPIES SERIES
Discharge: HOME | End: 2022-04-12
Payer: COMMERCIAL

## 2022-04-12 PROCEDURE — 97140 MANUAL THERAPY 1/> REGIONS: CPT | Performed by: PHYSICAL THERAPIST

## 2022-04-12 PROCEDURE — 97110 THERAPEUTIC EXERCISES: CPT | Performed by: PHYSICAL THERAPIST

## 2022-04-12 NOTE — FLOWSHEET NOTE
[facility],  [address] [place]  Phone: [phone]  Fax [phone]        Date:  2022    Patient Name:  Daryle Bishop    :  1965  MRN: [id number]  Restrictions/Precautions:    Medical/Treatment Diagnosis Information:  · Diagnosis: closed displaced fracture of greater tuberosity left humerus (S42.252D), left shoulder dislocation (S43.005D), s/p left shoulder ORIF and RTC repair DOS 22  · Treatment Diagnosis: left shoulder pain (O15.057)  Insurance/Certification information:  PT Insurance Information: [facility]  Physician Information:  Referring Practitioner: Junior Roland  Has the plan of care been signed (Y/N):        [x]  Yes  []  No     Date of Patient follow up with Physician: 3/28/22      Is this a Progress Report:     []  Yes  [x]  No        If Yes:  Date Range for reporting period:  Beginning: 3/22/22  Ending    Progress report will be due (10 Rx or 30 days whichever is less):        Recertification will be due (POC Duration  / 90 days whichever is less):       Visit # Insurance Allowable Auth Required   In-person 5 50 []  Yes [x]  No    Telehealth   []  Yes []  No    Total            Functional Scale:  FOTO functional questionnaire 28 (28%)    Date assessed:  3/22/22      Therapy Diagnosis/Practice Pattern: I      Number of Comorbidities:  [x]0     []1-2    []3+    Latex Allergy:  [x]NO      []YES  Preferred Language for Healthcare:   [x]English       []other:      Pain level:  0-2/10     SUBJECTIVE:  Patient reports his shoulder feels pretty good overall. Mores soreness than pain. Had to carry his dog at the vet, which made his shoulder sore.      OBJECTIVE:   Observation:    Test measurements:  NT this date    RESTRICTIONS/PRECAUTIONS: Clarified with Dr. Junior Roland this date as protocol and MD note state conflicting restrictions  Phase II: Weeks 4-6 of therapy Until   Progress scapula strengthening  Progress passive forward elevation and passive external rotation  May begin aquatic therapy after 6 weeks for AAROM. No swimming strokes. GOAL:Gradual progression of PROM. Exercises/Interventions:     Therapeutic Ex (77472) Sets/sec Reps Notes/CUES   Scap squeezes 3\" 2 x 15    Supine cane ER 5\" 10x          Table slides flexion 10\" 10x Forward         Upper trap stretch 30\" 3x ea B          RTB rows 2 10 No UE movement         Patient ed   Precautions/restrictions, continuing 1x/wk for 1-2 more weeks                Manual Intervention (37217)      Oscillations for pain, GH Joint mobilizations Gr III 5'     PROM left shoulder flexion, abduction, ER 10'     STM pec, upper trap 8'           Manual resistance to scap clocks 12 to 6, 9 to 3 1 10x ea Needs cueing         NMR re-education (54836)   CUES NEEDED   Scapular clocks 12 to 6, 9 to 3 1 10 Needs cueing                                                   Therapeutic Activity (68382)                                          HEP instruction:   Access Code: [email or web address]  URL: [email or web address]  Date: 03/22/2022  Prepared by: Philippe Lacy        Therapeutic Exercise and NMR EXR  [x] (40359) Provided verbal/tactile cueing for activities related to strengthening, flexibility, endurance, ROM  for improvements in scapular, scapulothoracic and UE control with self care, reaching, carrying, lifting, house/yardwork, driving/computer work. [x] (51026) Provided verbal/tactile cueing for activities related to improving balance, coordination, kinesthetic sense, posture, motor skill, proprioception  to assist with  scapular, scapulothoracic and UE control with self care, reaching, carrying, lifting, house/yardwork, driving/computer work.     Therapeutic Activities:    [] (03699 or 15782) Provided verbal/tactile cueing for activities related to improving balance, coordination, kinesthetic sense, posture, motor skill, proprioception and motor activation to allow for proper function of scapular, scapulothoracic and UE control with self care, carrying, lifting, driving/computer work. Home Exercise Program:    [x] (27791) Reviewed/Progressed HEP activities related to strengthening, flexibility, endurance, ROM of scapular, scapulothoracic and UE control with self care, reaching, carrying, lifting, house/yardwork, driving/computer work  [] (26565) Reviewed/Progressed HEP activities related to improving balance, coordination, kinesthetic sense, posture, motor skill, proprioception of scapular, scapulothoracic and UE control with self care, reaching, carrying, lifting, house/yardwork, driving/computer work      Manual Treatments:  PROM / STM / Oscillations-Mobs:  G-I, II, III, IV (PA's, Inf., Post.)  [x] (58009) Provided manual therapy to mobilize soft tissue/joints of cervical/CT, scapular GHJ and UE for the purpose of modulating pain, promoting relaxation,  increasing ROM, reducing/eliminating soft tissue swelling/inflammation/restriction, improving soft tissue extensibility and allowing for proper ROM for normal function with self care, reaching, carrying, lifting, house/yardwork, driving/computer work    Modalities:  Declined. Will ice at home    Charges  Timed Code Treatment Minutes: 40'   Total Treatment Minutes: 36'     [] EVAL (LOW) 92461   [] EVAL (MOD) 70084   [] EVAL (HIGH) 18684   [] RE-EVAL     [x] BX(10990) x   1  [] IONTO  [] NMR (19802) x     [] VASO  [x] Manual (91677) x  2  [] Other:  [] TA x      [] Mech Traction (91559)  [] ES(attended) (63201)      [] ES (un) (20048):       GOALS:  Patient stated goal: \"Full motion of shoulder and get back to work. \"  [] Progressing: [] Met: [] Not Met: [] Adjusted    Therapist goals for Patient:   Short Term Goals: To be achieved in: 2 weeks  1. Independent in HEP and progression per patient tolerance, in order to prevent re-injury. [] Progressing: [x] Met: [] Not Met: [] Adjusted  2.  Patient will have a decrease in pain to facilitate improvement in movement, function, and ADLs as indicated by Functional Deficits. [] Progressing: [x] Met: [] Not Met: [] Adjusted    Long Term Goals: To be achieved in: 12 weeks  1. Disability index score of 65% or more for the FOTO functional questionnaire to assist with reaching prior level of function. [] Progressing: [] Met: [] Not Met: [] Adjusted  2. Patient will demonstrate increased AROM of left shoulder flexion and abduction to 145, functional ER to T2, IR to T10 to allow for proper joint functioning as indicated by patients Functional Deficits. [] Progressing: [] Met: [] Not Met: [] Adjusted  3. Patient will demonstrate an increase in Strength in left shoulder to grossly 4+/5 to allow for proper functional mobility as indicated by patients Functional Deficits. [] Progressing: [] Met: [] Not Met: [] Adjusted  4. Patient will be able to drive without increased symptoms or restriction. [] Progressing: [] Met: [] Not Met: [] Adjusted  5. Patient will be able to lift a 5 pound object above shoulder height without increased symptoms or restriction in his left shoulder. [] Progressing: [] Met: [] Not Met: [] Adjusted  6. Patient will be able to return to work as a  without increased symptoms or restrictions in his left shoulder.(patient specific functional goal)    [] Progressing: [] Met: [] Not Met: [] Adjusted     Progression Towards Functional goals:  [] Patient is progressing as expected towards functional goals listed. [] Progression is slowed due to complexities listed. [] Progression has been slowed due to co-morbidities. [x] Plan just implemented, too soon to assess goals progression  [] Other:     ASSESSMENT:  Patient tolerated increased PROM per protocol progression well, with increased ranges noted. Continues to require education on not actively using LUE as protocol states he is PROM only. Will increase to 2x/wk next week.      Overall Progression Towards Functional goals/ Treatment Progress Update:  [] Patient is progressing as expected towards functional goals listed. [] Progression is slowed due to complexities/Impairments listed. [] Progression has been slowed due to co-morbidities. [x] Plan just implemented, too soon to assess goals progression <30days   [] Goals require adjustment due to lack of progress  [] Patient is not progressing as expected and requires additional follow up with physician  [] Other    Prognosis for POC: [x] Good [] Fair  [] Poor      Patient requires continued skilled intervention: [x] Yes  [] No    Treatment/Activity Tolerance:  [x] Patient able to complete treatment  [] Patient limited by fatigue  [] Patient limited by pain    [] Patient limited by other medical complications  [] Other:         PLAN: See eval  [x] Continue per plan of care [] Alter current plan (see comments above)  [] Plan of care initiated [] Hold pending MD visit [] Discharge      Electronically signed by:  Danitza Crain, PT, DPT [id number]     Note: If patient does not return for scheduled/ recommended follow up visits, this note will serve as a discharge from care along with most recent update on progress.

## 2022-04-14 ENCOUNTER — APPOINTMENT (OUTPATIENT)
Dept: PHYSICAL THERAPY | Age: 57
End: 2022-04-14
Payer: COMMERCIAL

## 2022-04-19 ENCOUNTER — HOSPITAL ENCOUNTER (OUTPATIENT)
Dept: PHYSICAL THERAPY | Age: 57
Setting detail: THERAPIES SERIES
Discharge: HOME | End: 2022-04-19
Payer: COMMERCIAL

## 2022-04-19 PROCEDURE — 97110 THERAPEUTIC EXERCISES: CPT | Performed by: PHYSICAL THERAPIST

## 2022-04-19 PROCEDURE — 97140 MANUAL THERAPY 1/> REGIONS: CPT | Performed by: PHYSICAL THERAPIST

## 2022-04-19 NOTE — FLOWSHEET NOTE
[facility],  [address] [place]  Phone: [phone]  Fax [phone]        Date:  2022    Patient Name:  Marisol Fletcher    :  1965  MRN: [id number]  Restrictions/Precautions:    Medical/Treatment Diagnosis Information:  · Diagnosis: closed displaced fracture of greater tuberosity left humerus (S42.252D), left shoulder dislocation (S43.005D), s/p left shoulder ORIF and RTC repair DOS 22  · Treatment Diagnosis: left shoulder pain (Q95.496)  Insurance/Certification information:  PT Insurance Information: [facility]  Physician Information:  Referring Practitioner: Jayme Ponce  Has the plan of care been signed (Y/N):        [x]  Yes  []  No     Date of Patient follow up with Physician: 3/28/22      Is this a Progress Report:     []  Yes  [x]  No        If Yes:  Date Range for reporting period:  Beginning: 3/22/22  Ending    Progress report will be due (10 Rx or 30 days whichever is less): 81       Recertification will be due (POC Duration  / 90 days whichever is less):       Visit # Insurance Allowable Auth Required   In-person 6 50 []  Yes [x]  No    Telehealth   []  Yes []  No    Total            Functional Scale:  FOTO functional questionnaire 28 (28%)    Date assessed:  3/22/22      Therapy Diagnosis/Practice Pattern: I      Number of Comorbidities:  [x]0     []1-2    []3+    Latex Allergy:  [x]NO      []YES  Preferred Language for Healthcare:   [x]English       []other:      Pain level:  0-2/10     SUBJECTIVE:  Patient reports his shoulder does not hurt. Reports that he tries to raise it over his head and he can't. Wants to know why it won't go.       OBJECTIVE:   Observation:    Test measurements:  Table slides flexion 110 deg    RESTRICTIONS/PRECAUTIONS: Clarified with Dr. Jayme Ponce this date as protocol and MD note state conflicting restrictions  Phase II: Weeks 4-6 of therapy Until   Progress scapula strengthening  Progress passive forward elevation and passive external rotation  May begin aquatic therapy after 6 weeks for AAROM. No swimming strokes. GOAL:Gradual progression of PROM. Exercises/Interventions:     Therapeutic Ex (26893) Sets/sec Reps Notes/CUES      Supine cane ER 5\" 10x          Table slides flexion 10\" 10x Forward         Upper trap stretch 30\" 3x ea B          GTB scap pinch 2 15 No UE movement         Patient ed   Precautions/restrictions, continuing 1x/wk for 1-2 more weeks                Manual Intervention (16757)      Oscillations for pain, GH Joint mobilizations Gr III 5'     PROM left shoulder flexion, abduction, ER 10'     STM pec, upper trap 7'           Manual resistance to scap clocks 12 to 6, 9 to 3 1 15x ea Needs cueing         NMR re-education (86627)   CUES NEEDED   Scapular clocks 12 to 6, 9 to 3 1 15 Needs cueing                                                   Therapeutic Activity (41771)                                          HEP instruction:   Access Code: [email or web address]  URL: [email or web address]  Date: 03/22/2022  Prepared by: Reginald Torres        Therapeutic Exercise and NMR EXR  [x] (84423) Provided verbal/tactile cueing for activities related to strengthening, flexibility, endurance, ROM  for improvements in scapular, scapulothoracic and UE control with self care, reaching, carrying, lifting, house/yardwork, driving/computer work. [x] (53746) Provided verbal/tactile cueing for activities related to improving balance, coordination, kinesthetic sense, posture, motor skill, proprioception  to assist with  scapular, scapulothoracic and UE control with self care, reaching, carrying, lifting, house/yardwork, driving/computer work.     Therapeutic Activities:    [] (36239 or 24438) Provided verbal/tactile cueing for activities related to improving balance, coordination, kinesthetic sense, posture, motor skill, proprioception and motor activation to allow for proper function of scapular, scapulothoracic and UE control with self care, carrying, lifting, driving/computer work. Home Exercise Program:    [x] (93706) Reviewed/Progressed HEP activities related to strengthening, flexibility, endurance, ROM of scapular, scapulothoracic and UE control with self care, reaching, carrying, lifting, house/yardwork, driving/computer work  [] (08164) Reviewed/Progressed HEP activities related to improving balance, coordination, kinesthetic sense, posture, motor skill, proprioception of scapular, scapulothoracic and UE control with self care, reaching, carrying, lifting, house/yardwork, driving/computer work      Manual Treatments:  PROM / STM / Oscillations-Mobs:  G-I, II, III, IV (PA's, Inf., Post.)  [x] (45396) Provided manual therapy to mobilize soft tissue/joints of cervical/CT, scapular GHJ and UE for the purpose of modulating pain, promoting relaxation,  increasing ROM, reducing/eliminating soft tissue swelling/inflammation/restriction, improving soft tissue extensibility and allowing for proper ROM for normal function with self care, reaching, carrying, lifting, house/yardwork, driving/computer work    Modalities:  Declined. Will ice at home    Charges  Timed Code Treatment Minutes: 45'   Total Treatment Minutes: 45'     [] EVAL (LOW) 49722   [] EVAL (MOD) 96497   [] EVAL (HIGH) 65917   [] RE-EVAL     [x] EM(76441) x   1  [] IONTO  [] NMR (22254) x     [] VASO  [x] Manual (29099) x  2  [] Other:  [] TA x      [] Mech Traction (00450)  [] ES(attended) (38905)      [] ES (un) (48537):       GOALS:  Patient stated goal: \"Full motion of shoulder and get back to work. \"  [] Progressing: [] Met: [] Not Met: [] Adjusted    Therapist goals for Patient:   Short Term Goals: To be achieved in: 2 weeks  1. Independent in HEP and progression per patient tolerance, in order to prevent re-injury. [] Progressing: [x] Met: [] Not Met: [] Adjusted  2.  Patient will have a decrease in pain to facilitate improvement in movement, function, and ADLs as indicated by Functional Deficits. [] Progressing: [x] Met: [] Not Met: [] Adjusted    Long Term Goals: To be achieved in: 12 weeks  1. Disability index score of 65% or more for the FOTO functional questionnaire to assist with reaching prior level of function. [] Progressing: [] Met: [] Not Met: [] Adjusted  2. Patient will demonstrate increased AROM of left shoulder flexion and abduction to 145, functional ER to T2, IR to T10 to allow for proper joint functioning as indicated by patients Functional Deficits. [] Progressing: [] Met: [] Not Met: [] Adjusted  3. Patient will demonstrate an increase in Strength in left shoulder to grossly 4+/5 to allow for proper functional mobility as indicated by patients Functional Deficits. [] Progressing: [] Met: [] Not Met: [] Adjusted  4. Patient will be able to drive without increased symptoms or restriction. [] Progressing: [] Met: [] Not Met: [] Adjusted  5. Patient will be able to lift a 5 pound object above shoulder height without increased symptoms or restriction in his left shoulder. [] Progressing: [] Met: [] Not Met: [] Adjusted  6. Patient will be able to return to work as a  without increased symptoms or restrictions in his left shoulder.(patient specific functional goal)    [] Progressing: [] Met: [] Not Met: [] Adjusted     Progression Towards Functional goals:  [] Patient is progressing as expected towards functional goals listed. [] Progression is slowed due to complexities listed. [] Progression has been slowed due to co-morbidities. [x] Plan just implemented, too soon to assess goals progression  [] Other:     ASSESSMENT:  Patient continues to improve with PROM. Patient has reported several instances since beginning PT in which he has been actively using his left shoulder, despite education to avoid active movement.  Patient educated on progression to AROM next week in PT, and to avoid using left shoulder until next week. Overall Progression Towards Functional goals/ Treatment Progress Update:  [] Patient is progressing as expected towards functional goals listed. [] Progression is slowed due to complexities/Impairments listed. [] Progression has been slowed due to co-morbidities. [x] Plan just implemented, too soon to assess goals progression <30days   [] Goals require adjustment due to lack of progress  [] Patient is not progressing as expected and requires additional follow up with physician  [] Other    Prognosis for POC: [x] Good [] Fair  [] Poor      Patient requires continued skilled intervention: [x] Yes  [] No    Treatment/Activity Tolerance:  [x] Patient able to complete treatment  [] Patient limited by fatigue  [] Patient limited by pain    [] Patient limited by other medical complications  [] Other:         PLAN: See eval  [x] Continue per plan of care [] Alter current plan (see comments above)  [] Plan of care initiated [] Hold pending MD visit [] Discharge      Electronically signed by:  Jose Cruz Tillman PT, DPT [id number]     Note: If patient does not return for scheduled/ recommended follow up visits, this note will serve as a discharge from care along with most recent update on progress.

## 2022-04-21 ENCOUNTER — APPOINTMENT (OUTPATIENT)
Dept: PHYSICAL THERAPY | Age: 57
End: 2022-04-21
Payer: COMMERCIAL

## 2022-04-26 ENCOUNTER — HOSPITAL ENCOUNTER (OUTPATIENT)
Dept: PHYSICAL THERAPY | Age: 57
Setting detail: THERAPIES SERIES
End: 2022-04-26
Payer: COMMERCIAL

## 2022-04-27 NOTE — FLOWSHEET NOTE
[facility],  [address] [place]  Phone: [phone]  Fax [phone]        Date:  3/22/2022    Patient Name:  Abebe Pettit    :  1965  MRN: [id number]  Restrictions/Precautions:    Medical/Treatment Diagnosis Information:  · Diagnosis: closed displaced fracture of greater tuberosity left humerus (S42.252D), left shoulder dislocation (S43.005D), s/p left shoulder ORIF and RTC repair DOS 22  · Treatment Diagnosis: left shoulder pain (Y16.504)  Insurance/Certification information:  PT Insurance Information: [employer]  Physician Information:  Referring Practitioner: Susan Lugo  Has the plan of care been signed (Y/N):        []  Yes  [x]  No     Date of Patient follow up with Physician: 3/28/22      Is this a Progress Report:     []  Yes  [x]  No        If Yes:  Date Range for reporting period:  Beginning: 3/22/22  Ending    Progress report will be due (10 Rx or 30 days whichever is less):        Recertification will be due (POC Duration  / 90 days whichever is less):       Visit # Insurance Allowable Auth Required   In-person 1  []  Yes []  No    Telehealth   []  Yes []  No    Total            Functional Scale:  FOTO functional questionnaire  (28%)    Date assessed:  3/22/22      Therapy Diagnosis/Practice Pattern: I      Number of Comorbidities:  [x]0     []1-2    []3+    Latex Allergy:  [x]NO      []YES  Preferred Language for Healthcare:   [x]English       []other:      Pain level:  1-2/10     SUBJECTIVE:  See eval    OBJECTIVE: See eval   Observation:    Test measurements:      RESTRICTIONS/PRECAUTIONS: PROM only, no driving    Exercises/Interventions:     Therapeutic Ex (20710) Sets/sec Reps Notes/CUES   Scap squeezes 3\" 2 x 10    Elbow AROM  3 10    Standing cane ER 10\" 10x Cueing to avoid use of LUE   Table slides flexion 10\" 10x Forward; cueing to avoid use of LUE         Upper trap stretch 30\" No new care gaps identified.  Powered by [employer]. Reference number: [id number].   4/27/2022 5:32:04 PM CDT   3x ea B                Patient ed   HEP, POC, ice, posture, precautions/restrictions, sleeping and driving timeline                     Manual Intervention (92880)      Oscillations for pain, GH Joint mobilizations Gr II/III 5'     PROM left shoulder flexion, abduction, ER 5'                             NMR re-education (20150)   CUES NEEDED                                                         Therapeutic Activity (40870)                                                Therapeutic Exercise and NMR EXR  [x] (76649) Provided verbal/tactile cueing for activities related to strengthening, flexibility, endurance, ROM  for improvements in scapular, scapulothoracic and UE control with self care, reaching, carrying, lifting, house/yardwork, driving/computer work. [x] (04395) Provided verbal/tactile cueing for activities related to improving balance, coordination, kinesthetic sense, posture, motor skill, proprioception  to assist with  scapular, scapulothoracic and UE control with self care, reaching, carrying, lifting, house/yardwork, driving/computer work. Therapeutic Activities:    [] (49771 or 75336) Provided verbal/tactile cueing for activities related to improving balance, coordination, kinesthetic sense, posture, motor skill, proprioception and motor activation to allow for proper function of scapular, scapulothoracic and UE control with self care, carrying, lifting, driving/computer work.      Home Exercise Program:    [x] (35746) Reviewed/Progressed HEP activities related to strengthening, flexibility, endurance, ROM of scapular, scapulothoracic and UE control with self care, reaching, carrying, lifting, house/yardwork, driving/computer work  [] (04836) Reviewed/Progressed HEP activities related to improving balance, coordination, kinesthetic sense, posture, motor skill, proprioception of scapular, scapulothoracic and UE control with self care, reaching, carrying, lifting, house/yardwork, driving/computer work      Manual Treatments:  PROM / STM / Oscillations-Mobs:  G-I, II, III, IV (PA's, Inf., Post.)  [x] (73235) Provided manual therapy to mobilize soft tissue/joints of cervical/CT, scapular GHJ and UE for the purpose of modulating pain, promoting relaxation,  increasing ROM, reducing/eliminating soft tissue swelling/inflammation/restriction, improving soft tissue extensibility and allowing for proper ROM for normal function with self care, reaching, carrying, lifting, house/yardwork, driving/computer work    Modalities:  Declined. Will ice at home    Charges  Timed Code Treatment Minutes: 24'   Total Treatment Minutes: 36'     [x] EVAL (LOW) 64839   [] EVAL (MOD) 07815   [] EVAL (HIGH) 47839   [] RE-EVAL     [x] NA(09015) x   1  [] IONTO  [] NMR (06204) x     [] VASO  [x] Manual (92711) x   1  [] Other:  [] TA x      [] Mech Traction (14077)  [] ES(attended) (74364)      [] ES (un) (87408):     GOALS:  HEP instruction:   Access Code: [email or web address]  URL: [email or web address]  Date: 03/22/2022  Prepared by: Joao Love    GOALS:  Patient stated goal: \"Full motion of shoulder and get back to work. \"  [] Progressing: [] Met: [] Not Met: [] Adjusted    Therapist goals for Patient:   Short Term Goals: To be achieved in: 2 weeks  1. Independent in HEP and progression per patient tolerance, in order to prevent re-injury. [] Progressing: [] Met: [] Not Met: [] Adjusted  2. Patient will have a decrease in pain to facilitate improvement in movement, function, and ADLs as indicated by Functional Deficits. [] Progressing: [] Met: [] Not Met: [] Adjusted    Long Term Goals: To be achieved in: 12 weeks  1. Disability index score of 65% or more for the FOTO functional questionnaire to assist with reaching prior level of function. [] Progressing: [] Met: [] Not Met: [] Adjusted  2.  Patient will demonstrate increased AROM of left shoulder flexion and abduction to 145, functional ER to T2, IR to T10 to allow for proper joint functioning as indicated by patients Functional Deficits. [] Progressing: [] Met: [] Not Met: [] Adjusted  3. Patient will demonstrate an increase in Strength in left shoulder to grossly 4+/5 to allow for proper functional mobility as indicated by patients Functional Deficits. [] Progressing: [] Met: [] Not Met: [] Adjusted  4. Patient will be able to drive without increased symptoms or restriction. [] Progressing: [] Met: [] Not Met: [] Adjusted  5. Patient will be able to lift a 5 pound object above shoulder height without increased symptoms or restriction in his left shoulder. [] Progressing: [] Met: [] Not Met: [] Adjusted  6. Patient will be able to return to work as a  without increased symptoms or restrictions in his left shoulder.(patient specific functional goal)    [] Progressing: [] Met: [] Not Met: [] Adjusted     Progression Towards Functional goals:  [] Patient is progressing as expected towards functional goals listed. [] Progression is slowed due to complexities listed. [] Progression has been slowed due to co-morbidities. [x] Plan just implemented, too soon to assess goals progression  [] Other:     ASSESSMENT:  See eval    Overall Progression Towards Functional goals/ Treatment Progress Update:  [] Patient is progressing as expected towards functional goals listed. [] Progression is slowed due to complexities/Impairments listed. [] Progression has been slowed due to co-morbidities.   [x] Plan just implemented, too soon to assess goals progression <30days   [] Goals require adjustment due to lack of progress  [] Patient is not progressing as expected and requires additional follow up with physician  [] Other    Prognosis for POC: [x] Good [] Fair  [] Poor      Patient requires continued skilled intervention: [x] Yes  [] No    Treatment/Activity Tolerance:  [x] Patient able to complete treatment  [] Patient limited by fatigue  [] Patient limited by pain    [] Patient limited by other medical complications  [] Other:         PLAN: See eval  [] Continue per plan of care [] Alter current plan (see comments above)  [x] Plan of care initiated [] Hold pending MD visit [] Discharge      Electronically signed by:  Forrest Escalera PT, DPT [id number]     Note: If patient does not return for scheduled/ recommended follow up visits, this note will serve as a discharge from care along with most recent update on progress.

## 2022-04-28 ENCOUNTER — HOSPITAL ENCOUNTER (OUTPATIENT)
Dept: PHYSICAL THERAPY | Age: 57
Setting detail: THERAPIES SERIES
Discharge: HOME | End: 2022-04-28
Payer: COMMERCIAL

## 2022-04-28 PROCEDURE — 97110 THERAPEUTIC EXERCISES: CPT | Performed by: PHYSICAL THERAPIST

## 2022-04-28 PROCEDURE — 97140 MANUAL THERAPY 1/> REGIONS: CPT | Performed by: PHYSICAL THERAPIST

## 2022-04-28 NOTE — FLOWSHEET NOTE
[facility],  [address] [place]  Phone: [phone]  Fax [phone]        Date:  2022    Patient Name:  Kimi Armenta    :  1965  MRN: [id number]  Restrictions/Precautions:    Medical/Treatment Diagnosis Information:  · Diagnosis: closed displaced fracture of greater tuberosity left humerus (S42.252D), left shoulder dislocation (S43.005D), s/p left shoulder ORIF and RTC repair DOS 22  · Treatment Diagnosis: left shoulder pain (D00.492)  Insurance/Certification information:  PT Insurance Information: [facility]  Physician Information:  Referring Practitioner: Nelly Francis  Has the plan of care been signed (Y/N):        [x]  Yes  []  No     Date of Patient follow up with Physician: 3/28/22      Is this a Progress Report:     [x]  Yes  []  No        If Yes:  Date Range for reporting period:  Beginning: 3/22/22  Endin22    Progress report will be due (10 Rx or 30 days whichever is less):       Recertification will be due (POC Duration  / 90 days whichever is less):       Visit # Insurance Allowable Auth Required   In-person 7 50 []  Yes [x]  No    Telehealth   []  Yes []  No    Total            Functional Scale:  FOTO functional questionnaire 28/100 (28%)    Date assessed:  3/22/22      Therapy Diagnosis/Practice Pattern: I      Number of Comorbidities:  [x]0     []1-2    []3+    Latex Allergy:  [x]NO      []YES  Preferred Language for Healthcare:   [x]English       []other:      Pain level:  0-2/10     SUBJECTIVE:  Patient reports his shoulder gets a little sore on occasion. Not giving him much pain. Piter Walker wants him to go back to work Monday.      OBJECTIVE:   Observation:    Test measurements:  PROM flexion 135 deg, ER 45 at 60 deg abd    RESTRICTIONS/PRECAUTIONS: Clarified with Dr. Nelly Francis this date as protocol and MD note state conflicting restrictions  Phase III:   Progression of PROM, to [facility], to AROM to normalize ROM. Progress slowly as tolerated. Careful not to over exert direct passive tension on the repair:oHorizontal abduction  oExternal rotation at multiple angles (45, 75, 90 degrees)  oFunctional internal rotation only as needed   Initiate posterior capsule stretching cross body adduction stretching as needed   No rotator cuff strengthening exercises. May do scapular, back, and biceps strengthening withlight resistance. Exercises/Interventions:     Therapeutic Ex (14408) Sets/sec Reps Notes/CUES      Supine cane ER 5\" 10x    Supine cane press and flex 5\" 10x    Table slides flexion 10\" 10x Forward                  Finisher 3D, circles 1 10x ea Gentle    Pulleys 3\" 15x    GTB Rows 2 10          Patient ed   Gradual progression from AAROM to AROM, continued restriction of no lifting or rotator cuff strengthening, return to work               Manual Intervention ([id number])      Oscillations for pain, [address] Joint mobilizations Gr III 4'     PROM left shoulder flexion, abduction, ER 8'     STM pec, upper trap 3'           Needs cueing         NMR re-education (40857)   CUES NEEDED   Needs cueing   Prone rows 3\" 20x    Prone ext 3\" 20                                        Therapeutic Activity (06479)                                          HEP instruction:   Access Code: [email or web address]  URL: [email or web address]  Date: 03/22/2022  Prepared by: Becky Bailey        Therapeutic Exercise and NMR EXR  [x] (13840) Provided verbal/tactile cueing for activities related to strengthening, flexibility, endurance, ROM  for improvements in scapular, scapulothoracic and UE control with self care, reaching, carrying, lifting, house/yardwork, driving/computer work.     [x] (27940) Provided verbal/tactile cueing for activities related to improving balance, coordination, kinesthetic sense, posture, motor skill, proprioception  to assist with  scapular, scapulothoracic and UE control with self care, reaching, carrying, lifting, house/yardwork, driving/computer work. Therapeutic Activities:    [] (39320 or 98741) Provided verbal/tactile cueing for activities related to improving balance, coordination, kinesthetic sense, posture, motor skill, proprioception and motor activation to allow for proper function of scapular, scapulothoracic and UE control with self care, carrying, lifting, driving/computer work. Home Exercise Program:    [x] (02651) Reviewed/Progressed HEP activities related to strengthening, flexibility, endurance, ROM of scapular, scapulothoracic and UE control with self care, reaching, carrying, lifting, house/yardwork, driving/computer work  [] (66576) Reviewed/Progressed HEP activities related to improving balance, coordination, kinesthetic sense, posture, motor skill, proprioception of scapular, scapulothoracic and UE control with self care, reaching, carrying, lifting, house/yardwork, driving/computer work      Manual Treatments:  PROM / STM / Oscillations-Mobs:  G-I, II, III, IV (PA's, Inf., Post.)  [x] (31748) Provided manual therapy to mobilize soft tissue/joints of cervical/CT, scapular GHJ and UE for the purpose of modulating pain, promoting relaxation,  increasing ROM, reducing/eliminating soft tissue swelling/inflammation/restriction, improving soft tissue extensibility and allowing for proper ROM for normal function with self care, reaching, carrying, lifting, house/yardwork, driving/computer work    Modalities:  Declined. Will ice at home    Charges  Timed Code Treatment Minutes: 40'   Total Treatment Minutes: 36'     [] EVAL (LOW) 93949   [] EVAL (MOD) 44697   [] EVAL (HIGH) 63675   [] RE-EVAL     [x] SQ(06852) x   2  [] IONTO  [] NMR (52179) x     [] VASO  [x] Manual (80268) x  1  [] Other:  [] TA x      [] Mech Traction (28935)  [] ES(attended) (40198)      [] ES (un) (31301):       GOALS:  Patient stated goal: \"Full motion of shoulder and get back to work. \"  [] Progressing: [] Met: [] Not Met: [] Adjusted    Therapist goals for Patient:   Short Term Goals: To be achieved in: 2 weeks  1. Independent in HEP and progression per patient tolerance, in order to prevent re-injury. [] Progressing: [x] Met: [] Not Met: [] Adjusted  2. Patient will have a decrease in pain to facilitate improvement in movement, function, and ADLs as indicated by Functional Deficits. [] Progressing: [x] Met: [] Not Met: [] Adjusted    Long Term Goals: To be achieved in: 12 weeks  1. Disability index score of 65% or more for the FOTO functional questionnaire to assist with reaching prior level of function. [] Progressing: [] Met: [] Not Met: [] Adjusted  2. Patient will demonstrate increased AROM of left shoulder flexion and abduction to 145, functional ER to T2, IR to T10 to allow for proper joint functioning as indicated by patients Functional Deficits. [x] Progressing: [] Met: [] Not Met: [] Adjusted  3. Patient will demonstrate an increase in Strength in left shoulder to grossly 4+/5 to allow for proper functional mobility as indicated by patients Functional Deficits. [x] Progressing: [] Met: [] Not Met: [] Adjusted  4. Patient will be able to drive without increased symptoms or restriction. [] Progressing: [x] Met: [] Not Met: [] Adjusted  5. Patient will be able to lift a 5 pound object above shoulder height without increased symptoms or restriction in his left shoulder. [x] Progressing: [] Met: [] Not Met: [] Adjusted  6. Patient will be able to return to work as a  without increased symptoms or restrictions in his left shoulder.(patient specific functional goal)    [x] Progressing: [] Met: [] Not Met: [] Adjusted     Progression Towards Functional goals:  [x] Patient is progressing as expected towards functional goals listed. [] Progression is slowed due to complexities listed. [] Progression has been slowed due to co-morbidities.   [] Plan just implemented, too soon to assess goals progression  [] Other: ASSESSMENT:  Patient tolerated AAROM well this date. Needed cueing for prone scap work to avoid shrug. Discussed with patient that per Dr. Laurent Benoit, he is not able to return to work at this time. He will likely be off for close to 5-6 months per PT's last discussion with MD. Discussed the importance of protecting the repair to ensure long term success of the surgery. Continued to educate patient on avoiding lifting and using left arm a lot at this time. Patient says he is not using it, but reports certain activities that he has done that uses left shoulder. Will continue to educate patient and remind him of restrictions/precautions. Patient to begin 2x/wk starting next week. Overall Progression Towards Functional goals/ Treatment Progress Update:  [x] Patient is progressing as expected towards functional goals listed. [] Progression is slowed due to complexities/Impairments listed. [] Progression has been slowed due to co-morbidities. [] Plan just implemented, too soon to assess goals progression <30days   [] Goals require adjustment due to lack of progress  [] Patient is not progressing as expected and requires additional follow up with physician  [] Other    Prognosis for POC: [x] Good [] Fair  [] Poor      Patient requires continued skilled intervention: [x] Yes  [] No    Treatment/Activity Tolerance:  [x] Patient able to complete treatment  [] Patient limited by fatigue  [] Patient limited by pain    [] Patient limited by other medical complications  [] Other:         PLAN: See eval  [x] Continue per plan of care [] Alter current plan (see comments above)  [] Plan of care initiated [] Hold pending MD visit [] Discharge      Electronically signed by:  Kameron Watkins PT, DPT [id number]     Note: If patient does not return for scheduled/ recommended follow up visits, this note will serve as a discharge from care along with most recent update on progress.

## 2022-05-03 ENCOUNTER — HOSPITAL ENCOUNTER (OUTPATIENT)
Dept: PHYSICAL THERAPY | Age: 57
Setting detail: THERAPIES SERIES
Discharge: HOME | End: 2022-05-03
Payer: COMMERCIAL

## 2022-05-03 PROCEDURE — 97110 THERAPEUTIC EXERCISES: CPT | Performed by: PHYSICAL THERAPIST

## 2022-05-03 PROCEDURE — 97140 MANUAL THERAPY 1/> REGIONS: CPT | Performed by: PHYSICAL THERAPIST

## 2022-05-03 NOTE — FLOWSHEET NOTE
[facility],  [address]  Phone: [phone]  Fax [phone]        Date:  5/3/2022    Patient Name:  Gunnar Howe    :  1965  MRN: [id number]  Restrictions/Precautions:    Medical/Treatment Diagnosis Information:  · Diagnosis: closed displaced fracture of greater tuberosity left humerus (S42.252D), left shoulder dislocation (S43.005D), s/p left shoulder ORIF and RTC repair DOS 22  · Treatment Diagnosis: left shoulder pain (Q13.815)  Insurance/Certification information:  PT Insurance Information: BC  Physician Information:  Referring Practitioner: Nadia Briceño  Has the plan of care been signed (Y/N):        [x]  Yes  []  No     Date of Patient follow up with Physician: 3/28/22      Is this a Progress Report:     []  Yes  [x]  No        If Yes:  Date Range for reporting period:  Beginnin22  Ending:     Progress report will be due (10 Rx or 30 days whichever is less):       Recertification will be due (POC Duration  / 90 days whichever is less):       Visit # Insurance Allowable Auth Required   In-person 8 50 []  Yes [x]  No    Telehealth   []  Yes []  No    Total            Functional Scale:  FOTO functional questionnaire 28/100 (28%)    Date assessed:  3/22/22      Therapy Diagnosis/Practice Pattern: I      Number of Comorbidities:  [x]0     []1-2    []3+    Latex Allergy:  [x]NO      []YES  Preferred Language for Healthcare:   [x]English       []other:      Pain level:  0-2/10     SUBJECTIVE:  Patient reports his shoulder is feeling pretty good. Gets sore with stretches sometimes. OBJECTIVE:   Observation:    Test measurements:  Cane ER 50 deg at 60 deg abd    RESTRICTIONS/PRECAUTIONS: Clarified with Dr. Nadia Briceño this date as protocol and MD note state conflicting restrictions  Phase III:   Progression of PROM, to AAROM, to AROM to normalize ROM. Progress slowly as tolerated.  Careful not to over exert direct passive tension on the repair:  oHorizontal abduction  oExternal rotation at multiple angles (45, 75, 90 degrees)  oFunctional internal rotation only as needed   Initiate posterior capsule stretching cross body adduction stretching as needed   No rotator cuff strengthening exercises. May do scapular, back, and biceps strengthening withlight resistance. Exercises/Interventions:     Therapeutic Ex (71144) Sets/sec Reps Notes/CUES      Supine cane ER 10\" 10x    Supine cane press and flex 5\" 10x    Forward   Supine AAROM flexion using RUE 1 10x    Sidelying ER 3\" 15x          Cross body stretch 15\" 5x    Gentle    Pulleys 3\" 15x       No $ 3\" 2 x 10 Needs cueing   Wall slides 3D 1 10x ea Cueing to avoid shrug   Wall slides cross body 1 10x ea Cueing to avoid shrug         Manual Intervention (75535)      Oscillations for pain, GH Joint mobilizations Gr III 4'     PROM left shoulder flexion, abduction, ER 6'               Needs cueing         NMR re-education (48814)   CUES NEEDED   Needs cueing   Prone rows 3\" 30x    Prone ext 3\" 30x                                        Therapeutic Activity (77425)                                          HEP instruction:   Access Code: [email or web address]  URL: [email or web address]  Date: 03/22/2022  Prepared by: Tatiana Collins        Therapeutic Exercise and NMR EXR  [x] (28800) Provided verbal/tactile cueing for activities related to strengthening, flexibility, endurance, ROM  for improvements in scapular, scapulothoracic and UE control with self care, reaching, carrying, lifting, house/yardwork, driving/computer work. [x] (89897) Provided verbal/tactile cueing for activities related to improving balance, coordination, kinesthetic sense, posture, motor skill, proprioception  to assist with  scapular, scapulothoracic and UE control with self care, reaching, carrying, lifting, house/yardwork, driving/computer work.     Therapeutic Activities:    [] (03405 or 42244) Provided verbal/tactile cueing for activities related to improving balance, coordination, kinesthetic sense, posture, motor skill, proprioception and motor activation to allow for proper function of scapular, scapulothoracic and UE control with self care, carrying, lifting, driving/computer work. Home Exercise Program:    [x] (98650) Reviewed/Progressed HEP activities related to strengthening, flexibility, endurance, ROM of scapular, scapulothoracic and UE control with self care, reaching, carrying, lifting, house/yardwork, driving/computer work  [] (15413) Reviewed/Progressed HEP activities related to improving balance, coordination, kinesthetic sense, posture, motor skill, proprioception of scapular, scapulothoracic and UE control with self care, reaching, carrying, lifting, house/yardwork, driving/computer work      Manual Treatments:  PROM / STM / Oscillations-Mobs:  G-I, II, III, IV (PA's, Inf., Post.)  [x] (39774) Provided manual therapy to mobilize soft tissue/joints of cervical/CT, scapular GHJ and UE for the purpose of modulating pain, promoting relaxation,  increasing ROM, reducing/eliminating soft tissue swelling/inflammation/restriction, improving soft tissue extensibility and allowing for proper ROM for normal function with self care, reaching, carrying, lifting, house/yardwork, driving/computer work    Modalities:  Declined. Will ice at home    Charges  Timed Code Treatment Minutes: 40'   Total Treatment Minutes: 36'     [] EVAL (LOW) 53671   [] EVAL (MOD) 59969   [] EVAL (HIGH) 72756   [] RE-EVAL     [x] EZ(60534) x   2  [] IONTO  [] NMR (61890) x     [] VASO  [x] Manual (30171) x  1  [] Other:  [] TA x      [] Mech Traction (07821)  [] ES(attended) (15226)      [] ES (un) (02256):       GOALS:  Patient stated goal: \"Full motion of shoulder and get back to work. \"  [] Progressing: [] Met: [] Not Met: [] Adjusted    Therapist goals for Patient:   Short Term Goals:  To be achieved in: 2 weeks  1. Independent in HEP and progression per patient tolerance, in order to prevent re-injury. [] Progressing: [x] Met: [] Not Met: [] Adjusted  2. Patient will have a decrease in pain to facilitate improvement in movement, function, and ADLs as indicated by Functional Deficits. [] Progressing: [x] Met: [] Not Met: [] Adjusted    Long Term Goals: To be achieved in: 12 weeks  1. Disability index score of 65% or more for the FOTO functional questionnaire to assist with reaching prior level of function. [] Progressing: [] Met: [] Not Met: [] Adjusted  2. Patient will demonstrate increased AROM of left shoulder flexion and abduction to 145, functional ER to T2, IR to T10 to allow for proper joint functioning as indicated by patients Functional Deficits. [x] Progressing: [] Met: [] Not Met: [] Adjusted  3. Patient will demonstrate an increase in Strength in left shoulder to grossly 4+/5 to allow for proper functional mobility as indicated by patients Functional Deficits. [x] Progressing: [] Met: [] Not Met: [] Adjusted  4. Patient will be able to drive without increased symptoms or restriction. [] Progressing: [x] Met: [] Not Met: [] Adjusted  5. Patient will be able to lift a 5 pound object above shoulder height without increased symptoms or restriction in his left shoulder. [x] Progressing: [] Met: [] Not Met: [] Adjusted  6. Patient will be able to return to work as a  without increased symptoms or restrictions in his left shoulder.(patient specific functional goal)    [x] Progressing: [] Met: [] Not Met: [] Adjusted     Progression Towards Functional goals:  [x] Patient is progressing as expected towards functional goals listed. [] Progression is slowed due to complexities listed. [] Progression has been slowed due to co-morbidities. [] Plan just implemented, too soon to assess goals progression  [] Other:     ASSESSMENT:  Patient needed cueing for posture throughout session.  Patient reverts back to forward shoulder posture without cueing, and needs cueing to avoid shrug during wall slides. Continues to progress well with new TE. Overall Progression Towards Functional goals/ Treatment Progress Update:  [x] Patient is progressing as expected towards functional goals listed. [] Progression is slowed due to complexities/Impairments listed. [] Progression has been slowed due to co-morbidities. [] Plan just implemented, too soon to assess goals progression <30days   [] Goals require adjustment due to lack of progress  [] Patient is not progressing as expected and requires additional follow up with physician  [] Other    Prognosis for POC: [x] Good [] Fair  [] Poor      Patient requires continued skilled intervention: [x] Yes  [] No    Treatment/Activity Tolerance:  [x] Patient able to complete treatment  [] Patient limited by fatigue  [] Patient limited by pain    [] Patient limited by other medical complications  [] Other:         PLAN: See eval  [x] Continue per plan of care [] Alter current plan (see comments above)  [] Plan of care initiated [] Hold pending MD visit [] Discharge      Electronically signed by:  Alex Emanuel PT, DPT [id number]     Note: If patient does not return for scheduled/ recommended follow up visits, this note will serve as a discharge from care along with most recent update on progress.

## 2022-05-05 ENCOUNTER — HOSPITAL ENCOUNTER (OUTPATIENT)
Dept: PHYSICAL THERAPY | Age: 57
Setting detail: THERAPIES SERIES
Discharge: HOME | End: 2022-05-05
Payer: COMMERCIAL

## 2022-05-05 PROCEDURE — 97110 THERAPEUTIC EXERCISES: CPT | Performed by: PHYSICAL THERAPIST

## 2022-05-05 PROCEDURE — 97140 MANUAL THERAPY 1/> REGIONS: CPT | Performed by: PHYSICAL THERAPIST

## 2022-05-05 NOTE — FLOWSHEET NOTE
[facility],  [address]  Phone: [phone]  Fax [phone]        Date:  2022    Patient Name:  Jessie Owen    :  1965  MRN: [id number]  Restrictions/Precautions:    Medical/Treatment Diagnosis Information:  · Diagnosis: closed displaced fracture of greater tuberosity left humerus (S42.252D), left shoulder dislocation (S43.005D), s/p left shoulder ORIF and RTC repair DOS 22  · Treatment Diagnosis: left shoulder pain (C27.029)  Insurance/Certification information:  PT Insurance Information: BC  Physician Information:  Referring Practitioner: Iva Harper  Has the plan of care been signed (Y/N):        [x]  Yes  []  No     Date of Patient follow up with Physician: 3/28/22      Is this a Progress Report:     []  Yes  [x]  No        If Yes:  Date Range for reporting period:  Beginnin22  Ending:     Progress report will be due (10 Rx or 30 days whichever is less):       Recertification will be due (POC Duration  / 90 days whichever is less):       Visit # Insurance Allowable Auth Required   In-person 9 50 []  Yes [x]  No    Telehealth   []  Yes []  No    Total            Functional Scale:  FOTO functional questionnaire 62/100 (62%)    Date assessed:  22      Therapy Diagnosis/Practice Pattern: I      Number of Comorbidities:  [x]0     []1-2    []3+    Latex Allergy:  [x]NO      []YES  Preferred Language for Healthcare:   [x]English       []other:      Pain level:  0-2/10     SUBJECTIVE:  Patient reports his shoulder was a little sore after last visit. Iced it and then it felt ok. OBJECTIVE:   Observation:    Test measurements:      RESTRICTIONS/PRECAUTIONS: Clarified with Dr. Iva Harper this date as protocol and MD note state conflicting restrictions  Phase III:   Progression of PROM, to AAROM, to AROM to normalize ROM. Progress slowly as tolerated.  Careful not to over exert direct passive tension on the repair:  oHorizontal abduction  oExternal rotation at multiple angles (45, 75, 90 degrees)  oFunctional internal rotation only as needed   Initiate posterior capsule stretching cross body adduction stretching as needed   No rotator cuff strengthening exercises. May do scapular, back, and biceps strengthening withlight resistance. Exercises/Interventions:     Therapeutic Ex (60893) Sets/sec Reps Notes/CUES      Supine cane ER 10\" 10x    Supine cane press and flex 5\" 10x    Forward   Supine AAROM flexion using RUE 1 15x    Sidelying ER 3\" 2 x 10    sidelying abd  15       Cross body stretch 15\" 5x    Gentle    Pulleys 3\" 15x ea flexion, scaption       No $ 3\" 2 x 10 Needs cueing   Wall slides 3D 1 10x ea Cueing to avoid shrug   Wall slides cross body 1 10x ea Cueing to avoid shrug         Manual Intervention (98586)      Oscillations for pain, GH Joint mobilizations Gr III 4'     PROM left shoulder flexion, abduction, ER 8'               Needs cueing         NMR re-education (66147)   CUES NEEDED   Needs cueing   Prone rows 3# 3\" 20x    Prone ext 1# 3\" 20x                                        Therapeutic Activity (97875)                                          HEP instruction:   Access Code: [email or web address]  URL: [email or web address]  Date: 03/22/2022  Prepared by: Claudia Mccallum        Therapeutic Exercise and NMR EXR  [x] (39049) Provided verbal/tactile cueing for activities related to strengthening, flexibility, endurance, ROM  for improvements in scapular, scapulothoracic and UE control with self care, reaching, carrying, lifting, house/yardwork, driving/computer work.     [x] (33039) Provided verbal/tactile cueing for activities related to improving balance, coordination, kinesthetic sense, posture, motor skill, proprioception  to assist with  scapular, scapulothoracic and UE control with self care, reaching, carrying, lifting, house/yardwork, driving/computer work.    Therapeutic Activities:    [] (48799 or 12785) Provided verbal/tactile cueing for activities related to improving balance, coordination, kinesthetic sense, posture, motor skill, proprioception and motor activation to allow for proper function of scapular, scapulothoracic and UE control with self care, carrying, lifting, driving/computer work. Home Exercise Program:    [x] (62772) Reviewed/Progressed HEP activities related to strengthening, flexibility, endurance, ROM of scapular, scapulothoracic and UE control with self care, reaching, carrying, lifting, house/yardwork, driving/computer work  [] (72151) Reviewed/Progressed HEP activities related to improving balance, coordination, kinesthetic sense, posture, motor skill, proprioception of scapular, scapulothoracic and UE control with self care, reaching, carrying, lifting, house/yardwork, driving/computer work      Manual Treatments:  PROM / STM / Oscillations-Mobs:  G-I, II, III, IV (PA's, Inf., Post.)  [x] (52844) Provided manual therapy to mobilize soft tissue/joints of cervical/CT, scapular GHJ and UE for the purpose of modulating pain, promoting relaxation,  increasing ROM, reducing/eliminating soft tissue swelling/inflammation/restriction, improving soft tissue extensibility and allowing for proper ROM for normal function with self care, reaching, carrying, lifting, house/yardwork, driving/computer work    Modalities:  Declined. Will ice at home    Charges  Timed Code Treatment Minutes: 40'   Total Treatment Minutes: 36'     [] EVAL (LOW) 71041   [] EVAL (MOD) 36718   [] EVAL (HIGH) 76556   [] RE-EVAL     [x] KP(46309) x   2  [] IONTO  [] NMR (85915) x     [] VASO  [x] Manual (16808) x  1  [] Other:  [] TA x      [] Mech Traction (39679)  [] ES(attended) (58355)      [] ES (un) (12057):       GOALS:  Patient stated goal: \"Full motion of shoulder and get back to work. \"  [] Progressing: [] Met: [] Not Met: [] Adjusted    Therapist goals for Patient: Short Term Goals: To be achieved in: 2 weeks  1. Independent in HEP and progression per patient tolerance, in order to prevent re-injury. [] Progressing: [x] Met: [] Not Met: [] Adjusted  2. Patient will have a decrease in pain to facilitate improvement in movement, function, and ADLs as indicated by Functional Deficits. [] Progressing: [x] Met: [] Not Met: [] Adjusted    Long Term Goals: To be achieved in: 12 weeks  1. Disability index score of 65% or more for the FOTO functional questionnaire to assist with reaching prior level of function. [] Progressing: [] Met: [] Not Met: [] Adjusted  2. Patient will demonstrate increased AROM of left shoulder flexion and abduction to 145, functional ER to T2, IR to T10 to allow for proper joint functioning as indicated by patients Functional Deficits. [x] Progressing: [] Met: [] Not Met: [] Adjusted  3. Patient will demonstrate an increase in Strength in left shoulder to grossly 4+/5 to allow for proper functional mobility as indicated by patients Functional Deficits. [x] Progressing: [] Met: [] Not Met: [] Adjusted  4. Patient will be able to drive without increased symptoms or restriction. [] Progressing: [x] Met: [] Not Met: [] Adjusted  5. Patient will be able to lift a 5 pound object above shoulder height without increased symptoms or restriction in his left shoulder. [x] Progressing: [] Met: [] Not Met: [] Adjusted  6. Patient will be able to return to work as a  without increased symptoms or restrictions in his left shoulder.(patient specific functional goal)    [x] Progressing: [] Met: [] Not Met: [] Adjusted     Progression Towards Functional goals:  [x] Patient is progressing as expected towards functional goals listed. [] Progression is slowed due to complexities listed. [] Progression has been slowed due to co-morbidities.   [] Plan just implemented, too soon to assess goals progression  [] Other:     ASSESSMENT:  Patient tolerated AROM well. Continues to require cueing for posture and to avoid shrug during all TE. Overall Progression Towards Functional goals/ Treatment Progress Update:  [x] Patient is progressing as expected towards functional goals listed. [] Progression is slowed due to complexities/Impairments listed. [] Progression has been slowed due to co-morbidities. [] Plan just implemented, too soon to assess goals progression <30days   [] Goals require adjustment due to lack of progress  [] Patient is not progressing as expected and requires additional follow up with physician  [] Other    Prognosis for POC: [x] Good [] Fair  [] Poor      Patient requires continued skilled intervention: [x] Yes  [] No    Treatment/Activity Tolerance:  [x] Patient able to complete treatment  [] Patient limited by fatigue  [] Patient limited by pain    [] Patient limited by other medical complications  [] Other:         PLAN: See eval  [x] Continue per plan of care [] Alter current plan (see comments above)  [] Plan of care initiated [] Hold pending MD visit [] Discharge      Electronically signed by:  Avi Arriaga, PT, DPT [id number]     Note: If patient does not return for scheduled/ recommended follow up visits, this note will serve as a discharge from care along with most recent update on progress.

## 2022-05-09 ENCOUNTER — HOSPITAL ENCOUNTER (OUTPATIENT)
Dept: PHYSICAL THERAPY | Age: 57
Setting detail: THERAPIES SERIES
Discharge: HOME | End: 2022-05-09
Payer: COMMERCIAL

## 2022-05-09 PROCEDURE — 97110 THERAPEUTIC EXERCISES: CPT | Performed by: PHYSICAL THERAPIST

## 2022-05-09 PROCEDURE — 97140 MANUAL THERAPY 1/> REGIONS: CPT | Performed by: PHYSICAL THERAPIST

## 2022-05-09 NOTE — FLOWSHEET NOTE
[facility], [address]  Phone: [phone]  Fax [phone]        Date:  2022    Patient Name:  Lata Burns    :  1965  MRN: [id number]  Restrictions/Precautions:    Medical/Treatment Diagnosis Information:  · Diagnosis: closed displaced fracture of greater tuberosity left humerus (S42.252D), left shoulder dislocation (S43.005D), s/p left shoulder ORIF and RTC repair DOS 22  · Treatment Diagnosis: left shoulder pain (T39.081)  Insurance/Certification information:  PT Insurance Information: [facility]  Physician Information:  Referring Practitioner: Shanta Menendez  Has the plan of care been signed (Y/N):        [x]  Yes  []  No     Date of Patient follow up with Physician: 22      Is this a Progress Report:     []  Yes  [x]  No        If Yes:  Date Range for reporting period:  Beginnin22  Ending:     Progress report will be due (10 Rx or 30 days whichever is less):       Recertification will be due (POC Duration  / 90 days whichever is less):       Visit # Insurance Allowable Auth Required   In-person 10 50 []  Yes [x]  No    Telehealth   []  Yes []  No    Total            Functional Scale:  FOTO functional questionnaire 62/100 (62%)    Date assessed:  22      Therapy Diagnosis/Practice Pattern: I      Number of Comorbidities:  [x]0     []1-2    []3+    Latex Allergy:  [x]NO      []YES  Preferred Language for Healthcare:   [x]English       []other:      Pain level:  0-2/10     SUBJECTIVE:  Patient reports just soreness noted overall, no pain. No issues from last visit. OBJECTIVE:   Observation:    Test measurements:      RESTRICTIONS/PRECAUTIONS: Clarified with Dr. Shanta Menendez this date as protocol and MD note state conflicting restrictions  Phase III:   Progression of PROM, to AAROM, to AROM to normalize ROM. Progress slowly as tolerated.  Careful not to over exert direct passive tension on the repair:  oHorizontal abduction  oExternal rotation at multiple angles (45, 75, 90 degrees)  oFunctional internal rotation only as needed   Initiate posterior capsule stretching cross body adduction stretching as needed   No rotator cuff strengthening exercises. May do scapular, back, and biceps strengthening withlight resistance. Exercises/Interventions:     Therapeutic Ex (78336) Sets/sec Reps Notes/CUES      Supine cane ER 10\" 10x    Supine cane press and flex 5\" 10x With and without cane   SA punches in supineH5 2x10 reps    Forward   Supine AAROM flexion using RUE 1 15x    Sidelying ER 3\" 2 x 10    sidelying abd  2x10 reps       Cross body stretch 15\" 5x    Gentle    Pulleys 3\" 15x ea flexion, scaption       No $ 3\" 2 x 10 Needs cueing   Wall slides 3D 1 10x ea Cueing to avoid shrug   Wall slides cross body 1 10x ea Cueing to avoid shrug         Manual Intervention (45500)      Oscillations for pain, GH Joint mobilizations Gr III 4'     PROM left shoulder flexion, abduction, ER 8'               Needs cueing         NMR re-education (70214)   CUES NEEDED   Needs cueing   Prone rows 3# 3\" 2x15    Prone ext 2# 3\" 20x                                        Therapeutic Activity (12900)                                          HEP instruction:   Access Code: [email or web address]  URL: [email or web address]  Date: 03/22/2022  Prepared by: Adam Thacker        Therapeutic Exercise and NMR EXR  [x] (50479) Provided verbal/tactile cueing for activities related to strengthening, flexibility, endurance, ROM  for improvements in scapular, scapulothoracic and UE control with self care, reaching, carrying, lifting, house/yardwork, driving/computer work.     [x] (72180) Provided verbal/tactile cueing for activities related to improving balance, coordination, kinesthetic sense, posture, motor skill, proprioception  to assist with  scapular, scapulothoracic and UE control with self care, reaching, carrying, lifting, house/yardwork, driving/computer work. Therapeutic Activities:    [] (99296 or 11471) Provided verbal/tactile cueing for activities related to improving balance, coordination, kinesthetic sense, posture, motor skill, proprioception and motor activation to allow for proper function of scapular, scapulothoracic and UE control with self care, carrying, lifting, driving/computer work. Home Exercise Program:    [x] (13883) Reviewed/Progressed HEP activities related to strengthening, flexibility, endurance, ROM of scapular, scapulothoracic and UE control with self care, reaching, carrying, lifting, house/yardwork, driving/computer work  [] (20401) Reviewed/Progressed HEP activities related to improving balance, coordination, kinesthetic sense, posture, motor skill, proprioception of scapular, scapulothoracic and UE control with self care, reaching, carrying, lifting, house/yardwork, driving/computer work      Manual Treatments:  PROM / STM / Oscillations-Mobs:  G-I, II, III, IV (PA's, Inf., Post.)  [x] (23466) Provided manual therapy to mobilize soft tissue/joints of cervical/CT, scapular GHJ and UE for the purpose of modulating pain, promoting relaxation,  increasing ROM, reducing/eliminating soft tissue swelling/inflammation/restriction, improving soft tissue extensibility and allowing for proper ROM for normal function with self care, reaching, carrying, lifting, house/yardwork, driving/computer work    Modalities:  Declined. Will ice at home    Charges  Timed Code Treatment Minutes: 40'   Total Treatment Minutes: 36'     [] EVAL (LOW) 88436   [] EVAL (MOD) 40053   [] EVAL (HIGH) 34593   [] RE-EVAL     [x] KT(88430) x   2  [] IONTO  [] NMR (58618) x     [] VASO  [x] Manual (46862) x  1  [] Other:  [] TA x      [] Mech Traction (06254)  [] ES(attended) (77957)      [] ES (un) (68901):       GOALS:  Patient stated goal: \"Full motion of shoulder and get back to work. \"  [] Progressing: [] Met: [] Not Met: [] Adjusted    Therapist goals for Patient:   Short Term Goals: To be achieved in: 2 weeks  1. Independent in HEP and progression per patient tolerance, in order to prevent re-injury. [] Progressing: [x] Met: [] Not Met: [] Adjusted  2. Patient will have a decrease in pain to facilitate improvement in movement, function, and ADLs as indicated by Functional Deficits. [] Progressing: [x] Met: [] Not Met: [] Adjusted    Long Term Goals: To be achieved in: 12 weeks  1. Disability index score of 65% or more for the FOTO functional questionnaire to assist with reaching prior level of function. [] Progressing: [] Met: [] Not Met: [] Adjusted  2. Patient will demonstrate increased AROM of left shoulder flexion and abduction to 145, functional ER to T2, IR to T10 to allow for proper joint functioning as indicated by patients Functional Deficits. [x] Progressing: [] Met: [] Not Met: [] Adjusted  3. Patient will demonstrate an increase in Strength in left shoulder to grossly 4+/5 to allow for proper functional mobility as indicated by patients Functional Deficits. [x] Progressing: [] Met: [] Not Met: [] Adjusted  4. Patient will be able to drive without increased symptoms or restriction. [] Progressing: [x] Met: [] Not Met: [] Adjusted  5. Patient will be able to lift a 5 pound object above shoulder height without increased symptoms or restriction in his left shoulder. [x] Progressing: [] Met: [] Not Met: [] Adjusted  6. Patient will be able to return to work as a  without increased symptoms or restrictions in his left shoulder.(patient specific functional goal)    [x] Progressing: [] Met: [] Not Met: [] Adjusted     Progression Towards Functional goals:  [x] Patient is progressing as expected towards functional goals listed. [] Progression is slowed due to complexities listed. [] Progression has been slowed due to co-morbidities.   [] Plan just implemented, too soon to assess goals progression  [] Other: ASSESSMENT:  Patient tolerated AROM well. Better scap control with less shld hiking noted this visit. No increased pain with increased reps with exercises this visit. Overall Progression Towards Functional goals/ Treatment Progress Update:  [x] Patient is progressing as expected towards functional goals listed. [] Progression is slowed due to complexities/Impairments listed. [] Progression has been slowed due to co-morbidities. [] Plan just implemented, too soon to assess goals progression <30days   [] Goals require adjustment due to lack of progress  [] Patient is not progressing as expected and requires additional follow up with physician  [] Other    Prognosis for POC: [x] Good [] Fair  [] Poor      Patient requires continued skilled intervention: [x] Yes  [] No    Treatment/Activity Tolerance:  [x] Patient able to complete treatment  [] Patient limited by fatigue  [] Patient limited by pain    [] Patient limited by other medical complications  [] Other:         PLAN: See eval. Progress per protocol. [x] Continue per plan of care [] Alter current plan (see comments above)  [] Plan of care initiated [] Hold pending MD visit [] Discharge      Electronically signed [place], [id number]    Note: If patient does not return for scheduled/ recommended follow up visits, this note will serve as a discharge from care along with most recent update on progress.

## 2022-05-11 ENCOUNTER — HOSPITAL ENCOUNTER (OUTPATIENT)
Dept: PHYSICAL THERAPY | Age: 57
Setting detail: THERAPIES SERIES
Discharge: HOME | End: 2022-05-11
Payer: COMMERCIAL

## 2022-05-11 PROCEDURE — 97110 THERAPEUTIC EXERCISES: CPT

## 2022-05-11 PROCEDURE — 97140 MANUAL THERAPY 1/> REGIONS: CPT

## 2022-05-11 NOTE — FLOWSHEET NOTE
[facility],  [address] [place]  Phone: [phone]  Fax [phone]        Date:  2022    Patient Name:  Svetlana Wheat    :  1965  MRN: [id number]  Restrictions/Precautions:    Medical/Treatment Diagnosis Information:  · Diagnosis: closed displaced fracture of greater tuberosity left humerus (S42.252D), left shoulder dislocation (S43.005D), s/p left shoulder ORIF and RTC repair DOS 22  · Treatment Diagnosis: left shoulder pain (G31.580)  Insurance/Certification information:  PT Insurance Information: [employer]  Physician Information:  Referring Practitioner: Shobha Yu  Has the plan of care been signed (Y/N):        [x]  Yes  []  No     Date of Patient follow up with Physician: 22      Is this a Progress Report:     []  Yes  [x]  No        If Yes:  Date Range for reporting period:  Beginnin22  Ending:     Progress report will be due (10 Rx or 30 days whichever is less):       Recertification will be due (POC Duration  / 90 days whichever is less):       Visit # Insurance Allowable Auth Required   In-person 11 50 []  Yes [x]  No          Functional Scale:  FOTO functional questionnaire 62/100 (62%)    Date assessed:  22      Therapy Diagnosis/Practice Pattern: I      Number of Comorbidities:  [x]0     []1-2    []3+    Latex Allergy:  [x]NO      []YES  Preferred Language for Healthcare:   [x]English       []other:      Pain level:  0-2/10     SUBJECTIVE:  Patient reports just soreness noted overall, no pain. No issues from last visit. OBJECTIVE:   Observation:    Test measurements:      RESTRICTIONS/PRECAUTIONS: Clarified with Dr. Shobha Yu this date as protocol and MD note state conflicting restrictions  Phase III:   Progression of PROM, to AAROM, to AROM to normalize ROM. Progress slowly as tolerated.  Careful not to over exert direct passive tension on the repair:  oHorizontal abduction  oExternal rotation at multiple angles (45, 75, 90 degrees)  oFunctional internal rotation only as needed   Initiate posterior capsule stretching cross body adduction stretching as needed   No rotator cuff strengthening exercises. May do scapular, back, and biceps strengthening withlight resistance. Exercises/Interventions:     Therapeutic Ex (09442) Sets/sec Reps Notes/CUES      Supine cane press and flex 5\" x30 With and without cane   Forward   Sidelying ER 3\" 3 x 10    sidelying abd  2x10 reps       Cross body stretch 15\" 5x    Finisher:  Flexion  Flexion + lift  Circles 1 20x ea Gentle    Pulleys 3\" 15x ea flexion, scaption    CC SA Rows  CC SA straight arm extension  3x10  3x10    No $ 3\" 2 x 10 Needs cueing   Wall slides 3D 1 10x ea Cueing to avoid shrug   Wall slides cross body 1 10x ea Cueing to avoid shrug         Manual Intervention (25179)      Oscillations for pain, GH Joint mobilizations Gr III 4'     PROM left shoulder flexion, abduction, ER 8'               Needs cueing         NMR re-education (11972)   CUES NEEDED   Needs cueing   Prone rows 4# 3\" 3x10    Prone ext 4# 3\" 3x10    AROM flexion  AROM abd  2x10  2x10                                  Therapeutic Activity (45136)                                            HEP instruction:   Access Code: [email or web address]  URL: [email or web address]  Date: 03/22/2022  Prepared by: Reginald Torres    Therapeutic Exercise and NMR EXR  [x] (92773) Provided verbal/tactile cueing for activities related to strengthening, flexibility, endurance, ROM  for improvements in scapular, scapulothoracic and UE control with self care, reaching, carrying, lifting, house/yardwork, driving/computer work.     [x] (35257) Provided verbal/tactile cueing for activities related to improving balance, coordination, kinesthetic sense, posture, motor skill, proprioception  to assist with  scapular, scapulothoracic and UE control with self care, reaching, carrying, lifting, house/yardwork, driving/computer work. Therapeutic Activities:    [] (25197 or 02903) Provided verbal/tactile cueing for activities related to improving balance, coordination, kinesthetic sense, posture, motor skill, proprioception and motor activation to allow for proper function of scapular, scapulothoracic and UE control with self care, carrying, lifting, driving/computer work. Home Exercise Program:    [x] (50127) Reviewed/Progressed HEP activities related to strengthening, flexibility, endurance, ROM of scapular, scapulothoracic and UE control with self care, reaching, carrying, lifting, house/yardwork, driving/computer work  [] (82084) Reviewed/Progressed HEP activities related to improving balance, coordination, kinesthetic sense, posture, motor skill, proprioception of scapular, scapulothoracic and UE control with self care, reaching, carrying, lifting, house/yardwork, driving/computer work      Manual Treatments:  PROM / STM / Oscillations-Mobs:  G-I, II, III, IV (PA's, Inf., Post.)  [x] (22297) Provided manual therapy to mobilize soft tissue/joints of cervical/CT, scapular GHJ and UE for the purpose of modulating pain, promoting relaxation,  increasing ROM, reducing/eliminating soft tissue swelling/inflammation/restriction, improving soft tissue extensibility and allowing for proper ROM for normal function with self care, reaching, carrying, lifting, house/yardwork, driving/computer work    Modalities:  Declined. Will ice at home    Charges  Timed Code Treatment Minutes: 40'   Total Treatment Minutes: 36'     [] EVAL (LOW) 43552   [] EVAL (MOD) 75016   [] EVAL (HIGH) 24566   [] RE-EVAL     [x] HD(38110) x   2  [] IONTO  [] NMR (03536) x     [] VASO  [x] Manual (13466) x  1  [] Other:  [] TA x      [] Mech Traction (08219)  [] ES(attended) (20798)      [] ES (un) (49602):       GOALS:  Patient stated goal: \"Full motion of shoulder and get back to work. \"  [] Progressing: [] Met: [] Not Met: [] Adjusted    Therapist goals for Patient:   Short Term Goals: To be achieved in: 2 weeks  1. Independent in HEP and progression per patient tolerance, in order to prevent re-injury. [] Progressing: [x] Met: [] Not Met: [] Adjusted  2. Patient will have a decrease in pain to facilitate improvement in movement, function, and ADLs as indicated by Functional Deficits. [] Progressing: [x] Met: [] Not Met: [] Adjusted    Long Term Goals: To be achieved in: 12 weeks  1. Disability index score of 65% or more for the FOTO functional questionnaire to assist with reaching prior level of function. [] Progressing: [] Met: [] Not Met: [] Adjusted  2. Patient will demonstrate increased AROM of left shoulder flexion and abduction to 145, functional ER to T2, IR to T10 to allow for proper joint functioning as indicated by patients Functional Deficits. [x] Progressing: [] Met: [] Not Met: [] Adjusted  3. Patient will demonstrate an increase in Strength in left shoulder to grossly 4+/5 to allow for proper functional mobility as indicated by patients Functional Deficits. [x] Progressing: [] Met: [] Not Met: [] Adjusted  4. Patient will be able to drive without increased symptoms or restriction. [] Progressing: [x] Met: [] Not Met: [] Adjusted  5. Patient will be able to lift a 5 pound object above shoulder height without increased symptoms or restriction in his left shoulder. [x] Progressing: [] Met: [] Not Met: [] Adjusted  6. Patient will be able to return to work as a  without increased symptoms or restrictions in his left shoulder.(patient specific functional goal)    [x] Progressing: [] Met: [] Not Met: [] Adjusted     ASSESSMENT:  Patient tolerated slight increase in workload this visit without issue. He would continue to benefit from skilled physical therapy to maximize his functional outcomes and progress towards goals.     Overall Progression Towards Functional goals/ Treatment Progress Update:  [x] Patient is progressing as expected towards functional goals listed. [] Progression is slowed due to complexities/Impairments listed. [] Progression has been slowed due to co-morbidities. [] Plan just implemented, too soon to assess goals progression <30days   [] Goals require adjustment due to lack of progress  [] Patient is not progressing as expected and requires additional follow up with physician  [] Other    Prognosis for POC: [x] Good [] Fair  [] Poor      Patient requires continued skilled intervention: [x] Yes  [] No    Treatment/Activity Tolerance:  [x] Patient able to complete treatment  [] Patient limited by fatigue  [] Patient limited by pain    [] Patient limited by other medical complications  [] Other:         PLAN: See eval. Progress per protocol. [x] Continue per plan of care [] Alter current plan (see comments above)  [] Plan of care initiated [] Hold pending MD visit [] Discharge      Electronically signed by:  Kacey Gtz PT    Note: If patient does not return for scheduled/ recommended follow up visits, this note will serve as a discharge from care along with most recent update on progress.

## 2022-05-17 ENCOUNTER — HOSPITAL ENCOUNTER (OUTPATIENT)
Dept: PHYSICAL THERAPY | Age: 57
Setting detail: THERAPIES SERIES
Discharge: HOME | End: 2022-05-17
Payer: COMMERCIAL

## 2022-05-17 PROCEDURE — 97110 THERAPEUTIC EXERCISES: CPT | Performed by: PHYSICAL THERAPIST

## 2022-05-17 PROCEDURE — 97140 MANUAL THERAPY 1/> REGIONS: CPT | Performed by: PHYSICAL THERAPIST

## 2022-05-17 NOTE — FLOWSHEET NOTE
[facility],  [address] [place]  Phone: [phone]  Fax [phone]        Date:  2022    Patient Name:  Shady Braga    :  1965  MRN: [id number]  Restrictions/Precautions:    Medical/Treatment Diagnosis Information:  · Diagnosis: closed displaced fracture of greater tuberosity left humerus (S42.252D), left shoulder dislocation (S43.005D), s/p left shoulder ORIF and RTC repair DOS 22  · Treatment Diagnosis: left shoulder pain (W31.265)  Insurance/Certification information:  PT Insurance Information: BC  Physician Information:  Referring Practitioner: Gabrielle Rizzo  Has the plan of care been signed (Y/N):        [x]  Yes  []  No     Date of Patient follow up with Physician: 22      Is this a Progress Report:     []  Yes  [x]  No        If Yes:  Date Range for reporting period:  Beginnin22  Ending:     Progress report will be due (10 Rx or 30 days whichever is less): 3/48/82      Recertification will be due (POC Duration  / 90 days whichever is less):       Visit # Insurance Allowable Auth Required   In-person 11 50 []  Yes [x]  No          Functional Scale:  FOTO functional questionnaire 62/100 (62%)    Date assessed:  22      Therapy Diagnosis/Practice Pattern: I      Number of Comorbidities:  [x]0     []1-2    []3+    Latex Allergy:  [x]NO      []YES  Preferred Language for Healthcare:   [x]English       []other:      Pain level:  0-2/10     SUBJECTIVE:  Patient reports that his shoulder is a little sore at times. Ice helps. Not really using it much. OBJECTIVE:   Observation:    Test measurements:  AROM    RESTRICTIONS/PRECAUTIONS: Clarified with Dr. Gabrielle Rizzo this date as protocol and MD note state conflicting restrictions  Phase III:   Progression of PROM, to AAROM, to AROM to normalize ROM. Progress slowly as tolerated.  Careful not to over exert direct passive tension on the repair:  oHorizontal abduction  oExternal rotation at multiple angles (45, 75, 90 degrees)  oFunctional internal rotation only as needed   Initiate posterior capsule stretching cross body adduction stretching as needed   No rotator cuff strengthening exercises. May do scapular, back, and biceps strengthening withlight resistance. Exercises/Interventions:     Therapeutic Ex (39403) Sets/sec Reps Notes/CUES      Supine cane press and flex 3# 5\" 15x    Forward   Sidelying ER 2 15x    sidelying abd  2x10 reps    Supine flexion 2 15    Cross body stretch 15\" 5x    Gentle    Pulleys 3\" 15x ea flexion, scaption    CC SA Rows  CC SA straight arm extension  3x10  3x10    No $ 3\" 2 x 10 Needs cueing   Wall slides 3D 1 10x ea Cueing to avoid shrug   Wall slides cross body 1 10x ea Cueing to avoid shrug   Bicep curls 4# 2 10          Manual Intervention (60351)      Oscillations for pain, GH Joint mobilizations Gr III 4'     PROM left shoulder flexion, abduction, ER 8'               Needs cueing         NMR re-education (76171)   CUES NEEDED   Needs cueing   Prone rows 4# 3\" 3x10    Prone ext 4# 3\" 3x10                                     Therapeutic Activity (48372)                                            HEP instruction:   Access Code: [email or web address]  URL: [email or web address]  Date: 03/22/2022  Prepared by: Michael Enciso    Therapeutic Exercise and NMR EXR  [x] (52070) Provided verbal/tactile cueing for activities related to strengthening, flexibility, endurance, ROM  for improvements in scapular, scapulothoracic and UE control with self care, reaching, carrying, lifting, house/yardwork, driving/computer work.     [x] (60538) Provided verbal/tactile cueing for activities related to improving balance, coordination, kinesthetic sense, posture, motor skill, proprioception  to assist with  scapular, scapulothoracic and UE control with self care, reaching, carrying, lifting, house/yardwork, driving/computer work.    Therapeutic Activities:    [] (80000 or 71059) Provided verbal/tactile cueing for activities related to improving balance, coordination, kinesthetic sense, posture, motor skill, proprioception and motor activation to allow for proper function of scapular, scapulothoracic and UE control with self care, carrying, lifting, driving/computer work. Home Exercise Program:    [x] (97913) Reviewed/Progressed HEP activities related to strengthening, flexibility, endurance, ROM of scapular, scapulothoracic and UE control with self care, reaching, carrying, lifting, house/yardwork, driving/computer work  [] (76118) Reviewed/Progressed HEP activities related to improving balance, coordination, kinesthetic sense, posture, motor skill, proprioception of scapular, scapulothoracic and UE control with self care, reaching, carrying, lifting, house/yardwork, driving/computer work      Manual Treatments:  PROM / STM / Oscillations-Mobs:  G-I, II, III, IV (PA's, Inf., Post.)  [x] (71474) Provided manual therapy to mobilize soft tissue/joints of cervical/CT, scapular GHJ and UE for the purpose of modulating pain, promoting relaxation,  increasing ROM, reducing/eliminating soft tissue swelling/inflammation/restriction, improving soft tissue extensibility and allowing for proper ROM for normal function with self care, reaching, carrying, lifting, house/yardwork, driving/computer work    Modalities:  Declined. Will ice at home    Charges  Timed Code Treatment Minutes: 40'   Total Treatment Minutes: 36'     [] EVAL (LOW) 66413   [] EVAL (MOD) 54571   [] EVAL (HIGH) 50142   [] RE-EVAL     [x] ZN(27860) x   2  [] IONTO  [] NMR (00446) x     [] VASO  [x] Manual (71249) x  1  [] Other:  [] TA x      [] Mech Traction (72157)  [] ES(attended) (03915)      [] ES (un) (97651):       GOALS:  Patient stated goal: \"Full motion of shoulder and get back to work. \"  [] Progressing: [] Met: [] Not Met: [] Adjusted    Therapist goals for Patient: Short Term Goals: To be achieved in: 2 weeks  1. Independent in HEP and progression per patient tolerance, in order to prevent re-injury. [] Progressing: [x] Met: [] Not Met: [] Adjusted  2. Patient will have a decrease in pain to facilitate improvement in movement, function, and ADLs as indicated by Functional Deficits. [] Progressing: [x] Met: [] Not Met: [] Adjusted    Long Term Goals: To be achieved in: 12 weeks  1. Disability index score of 65% or more for the FOTO functional questionnaire to assist with reaching prior level of function. [x] Progressing: [] Met: [] Not Met: [] Adjusted  2. Patient will demonstrate increased AROM of left shoulder flexion and abduction to 145, functional ER to T2, IR to T10 to allow for proper joint functioning as indicated by patients Functional Deficits. [x] Progressing: [] Met: [] Not Met: [] Adjusted  3. Patient will demonstrate an increase in Strength in left shoulder to grossly 4+/5 to allow for proper functional mobility as indicated by patients Functional Deficits. [x] Progressing: [] Met: [] Not Met: [] Adjusted  4. Patient will be able to drive without increased symptoms or restriction. [] Progressing: [x] Met: [] Not Met: [] Adjusted  5. Patient will be able to lift a 5 pound object above shoulder height without increased symptoms or restriction in his left shoulder. [x] Progressing: [] Met: [] Not Met: [] Adjusted  6. Patient will be able to return to work as a  without increased symptoms or restrictions in his left shoulder.(patient specific functional goal)    [x] Progressing: [] Met: [] Not Met: [] Adjusted     ASSESSMENT:  Patient needs cueing for slow and controlled active movements of his LUE. Demonstrates most tightness into IR, but overall continues to progress well. Overall Progression Towards Functional goals/ Treatment Progress Update:  [x] Patient is progressing as expected towards functional goals listed.     [] Progression is slowed due to complexities/Impairments listed. [] Progression has been slowed due to co-morbidities. [] Plan just implemented, too soon to assess goals progression <30days   [] Goals require adjustment due to lack of progress  [] Patient is not progressing as expected and requires additional follow up with physician  [] Other    Prognosis for POC: [x] Good [] Fair  [] Poor      Patient requires continued skilled intervention: [x] Yes  [] No    Treatment/Activity Tolerance:  [x] Patient able to complete treatment  [] Patient limited by fatigue  [] Patient limited by pain    [] Patient limited by other medical complications  [] Other:         PLAN: See eval. Progress per protocol. [x] Continue per plan of care [] Alter current plan (see comments above)  [] Plan of care initiated [] Hold pending MD visit [] Discharge      Electronically signed by:  Deya Arnold, PT, DPT [id number]     Note: If patient does not return for scheduled/ recommended follow up visits, this note will serve as a discharge from care along with most recent update on progress.

## 2022-05-19 ENCOUNTER — HOSPITAL ENCOUNTER (OUTPATIENT)
Dept: PHYSICAL THERAPY | Age: 57
Setting detail: THERAPIES SERIES
Discharge: HOME | End: 2022-05-19
Payer: COMMERCIAL

## 2022-05-19 ENCOUNTER — TELEPHONE (OUTPATIENT)
Dept: ORTHOPEDIC SURGERY | Age: 57
End: 2022-05-19

## 2022-05-19 PROCEDURE — 97110 THERAPEUTIC EXERCISES: CPT | Performed by: PHYSICAL THERAPIST

## 2022-05-19 PROCEDURE — 97140 MANUAL THERAPY 1/> REGIONS: CPT | Performed by: PHYSICAL THERAPIST

## 2022-05-19 NOTE — FLOWSHEET NOTE
[facility], [address]  Phone: [phone]  Fax [phone]        Date:  2022    Patient Name:  Daryle Bishop    :  1965  MRN: [id number]  Restrictions/Precautions:    Medical/Treatment Diagnosis Information:  · Diagnosis: closed displaced fracture of greater tuberosity left humerus (S42.252D), left shoulder dislocation (S43.005D), s/p left shoulder ORIF and RTC repair DOS 22  · Treatment Diagnosis: left shoulder pain (F64.614)  Insurance/Certification information:  PT Insurance Information: [facility]  Physician Information:  Referring Practitioner: Junior Roland  Has the plan of care been signed (Y/N):        [x]  Yes  []  No     Date of Patient follow up with Physician: 22      Is this a Progress Report:     []  Yes  [x]  No        If Yes:  Date Range for reporting period:  Beginnin22  Ending:     Progress report will be due (10 Rx or 30 days whichever is less):       Recertification will be due (POC Duration  / 90 days whichever is less):       Visit # Insurance Allowable Auth Required   In-person 12 50 []  Yes [x]  No          Functional Scale:  FOTO functional questionnaire 62/100 (62%)    Date assessed:  22      Therapy Diagnosis/Practice Pattern: I      Number of Comorbidities:  [x]0     []1-2    []3+    Latex Allergy:  [x]NO      []YES  Preferred Language for Healthcare:   [x]English       []other:      Pain level:  0-2/10     SUBJECTIVE:  Patient reports that his shoulder has its normal soreness. OBJECTIVE:   Observation:    Test measurements:  PROM ER 70 at 75 deg abd    RESTRICTIONS/PRECAUTIONS: Clarified with Dr. Junior Roland this date as protocol and MD note state conflicting restrictions  Phase III:   Progression of PROM, to AAROM, to AROM to normalize ROM. Progress slowly as tolerated.  Careful not to over exert direct passive tension on the repair:  oHorizontal abduction  oExternal rotation at multiple angles (45, 75, 90 degrees)  oFunctional internal rotation only as needed   Initiate posterior capsule stretching cross body adduction stretching as needed   No rotator cuff strengthening exercises. May do scapular, back, and biceps strengthening withlight resistance. Exercises/Interventions:     Therapeutic Ex (51698) Sets/sec Reps Notes/CUES      Supine cane ER 10\" 10x Supine cane press and flex 3# 5\" 15x    Forward   Sidelying ER 2 15x    sidelying abd 2 15x    Supine flexion 2 15x    Cross body stretch 15\" 5x    Gentle    Pulleys 3\" 15x ea flexion, scaption    CC SA Rows 25# 2 10    CC lat pull downs 40# 2 10 Needs cueng   No $ 3\" 2 x 10 Needs cueing   Wall slides 3D 1 10x ea Cueing to avoid shrug   Wall slides cross body 1 10x ea Cueing to avoid shrug   Bicep curls 5# 2 15          Manual Intervention (84111)      Oscillations for pain, GH Joint mobilizations Gr III 5'     PROM left shoulder flexion, abduction, ER 10'               Needs cueing         NMR re-education (00023)   CUES NEEDED   Needs cueing                                          Therapeutic Activity (58501)                                            HEP instruction:   Access Code: [email or web address]  URL: [email or web address]  Date: 03/22/2022  Prepared by: Dennis Diana    Therapeutic Exercise and NMR EXR  [x] (13668) Provided verbal/tactile cueing for activities related to strengthening, flexibility, endurance, ROM  for improvements in scapular, scapulothoracic and UE control with self care, reaching, carrying, lifting, house/yardwork, driving/computer work. [x] (77666) Provided verbal/tactile cueing for activities related to improving balance, coordination, kinesthetic sense, posture, motor skill, proprioception  to assist with  scapular, scapulothoracic and UE control with self care, reaching, carrying, lifting, house/yardwork, driving/computer work.     Therapeutic Activities:    [] (28996 or 07795) Provided verbal/tactile cueing for activities related to improving balance, coordination, kinesthetic sense, posture, motor skill, proprioception and motor activation to allow for proper function of scapular, scapulothoracic and UE control with self care, carrying, lifting, driving/computer work. Home Exercise Program:    [x] (55660) Reviewed/Progressed HEP activities related to strengthening, flexibility, endurance, ROM of scapular, scapulothoracic and UE control with self care, reaching, carrying, lifting, house/yardwork, driving/computer work  [] (07798) Reviewed/Progressed HEP activities related to improving balance, coordination, kinesthetic sense, posture, motor skill, proprioception of scapular, scapulothoracic and UE control with self care, reaching, carrying, lifting, house/yardwork, driving/computer work      Manual Treatments:  PROM / STM / Oscillations-Mobs:  G-I, II, III, IV (PA's, Inf., Post.)  [x] (01094) Provided manual therapy to mobilize soft tissue/joints of cervical/CT, scapular GHJ and UE for the purpose of modulating pain, promoting relaxation,  increasing ROM, reducing/eliminating soft tissue swelling/inflammation/restriction, improving soft tissue extensibility and allowing for proper ROM for normal function with self care, reaching, carrying, lifting, house/yardwork, driving/computer work    Modalities:  Declined. Will ice at home    Charges  Timed Code Treatment Minutes: 40'   Total Treatment Minutes: 36'     [] EVAL (LOW) 15529   [] EVAL (MOD) 56858   [] EVAL (HIGH) 35024   [] RE-EVAL     [x] NM(06825) x   2  [] IONTO  [] NMR (69945) x     [] VASO  [x] Manual (19931) x  1  [] Other:  [] TA x      [] Mech Traction (18032)  [] ES(attended) (74571)      [] ES (un) (04248):       GOALS:  Patient stated goal: \"Full motion of shoulder and get back to work. \"  [] Progressing: [] Met: [] Not Met: [] Adjusted    Therapist goals for Patient:   Short Term Goals:  To be achieved in: 2 weeks  1. Independent in HEP and progression per patient tolerance, in order to prevent re-injury. [] Progressing: [x] Met: [] Not Met: [] Adjusted  2. Patient will have a decrease in pain to facilitate improvement in movement, function, and ADLs as indicated by Functional Deficits. [] Progressing: [x] Met: [] Not Met: [] Adjusted    Long Term Goals: To be achieved in: 12 weeks  1. Disability index score of 65% or more for the FOTO functional questionnaire to assist with reaching prior level of function. [x] Progressing: [] Met: [] Not Met: [] Adjusted  2. Patient will demonstrate increased AROM of left shoulder flexion and abduction to 145, functional ER to T2, IR to T10 to allow for proper joint functioning as indicated by patients Functional Deficits. [x] Progressing: [] Met: [] Not Met: [] Adjusted  3. Patient will demonstrate an increase in Strength in left shoulder to grossly 4+/5 to allow for proper functional mobility as indicated by patients Functional Deficits. [x] Progressing: [] Met: [] Not Met: [] Adjusted  4. Patient will be able to drive without increased symptoms or restriction. [] Progressing: [x] Met: [] Not Met: [] Adjusted  5. Patient will be able to lift a 5 pound object above shoulder height without increased symptoms or restriction in his left shoulder. [x] Progressing: [] Met: [] Not Met: [] Adjusted  6. Patient will be able to return to work as a  without increased symptoms or restrictions in his left shoulder.(patient specific functional goal)    [x] Progressing: [] Met: [] Not Met: [] Adjusted     ASSESSMENT:  Patient progressing well within protocol guidelines. Continues to require cueing for posture and to perform exercises as instructed as he tends to rush through them and lose his form. Overall Progression Towards Functional goals/ Treatment Progress Update:  [x] Patient is progressing as expected towards functional goals listed.     [] Progression is slowed due to complexities/Impairments listed. [] Progression has been slowed due to co-morbidities. [] Plan just implemented, too soon to assess goals progression <30days   [] Goals require adjustment due to lack of progress  [] Patient is not progressing as expected and requires additional follow up with physician  [] Other    Prognosis for POC: [x] Good [] Fair  [] Poor      Patient requires continued skilled intervention: [x] Yes  [] No    Treatment/Activity Tolerance:  [x] Patient able to complete treatment  [] Patient limited by fatigue  [] Patient limited by pain    [] Patient limited by other medical complications  [] Other:       PLAN: See eval. Progress per protocol. [x] Continue per plan of care [] Alter current plan (see comments above)  [] Plan of care initiated [] Hold pending MD visit [] Discharge      Electronically signed by:  Yuliya Thrasher, PT, DPT [id number]     Note: If patient does not return for scheduled/ recommended follow up visits, this note will serve as a discharge from care along with most recent update on progress.

## 2022-05-23 ENCOUNTER — OFFICE VISIT (OUTPATIENT)
Dept: ORTHOPEDIC SURGERY | Age: 57
End: 2022-05-23
Payer: COMMERCIAL

## 2022-05-23 VITALS — BODY MASS INDEX: 38.19 KG/M2 | HEIGHT: 72 IN | WEIGHT: 282 LBS

## 2022-05-23 DIAGNOSIS — S42.142A: ICD-10-CM

## 2022-05-23 DIAGNOSIS — S42.292G CLOSED 3-PART FRACTURE OF PROXIMAL HUMERUS WITH DELAYED HEALING, LEFT: ICD-10-CM

## 2022-05-23 DIAGNOSIS — Z98.890 S/P SHOULDER SURGERY: Primary | ICD-10-CM

## 2022-05-23 PROCEDURE — 99213 OFFICE O/P EST LOW 20 MIN: CPT | Performed by: PHYSICIAN ASSISTANT

## 2022-05-23 NOTE — LETTER
[address] [place] [address] Dr Katia Tobar [facility] [address]  Phone: [phone]  Fax: [phone]    Simone Chester        May 23, 2022     Patient: Denice White   YOB: 1965   Date of Visit: 5/23/2022       To Whom It May Concern: It is my medical opinion that Celio Davenport may return to work on 5/31/22 with the following restrictions: lifting/carrying not to exceed 15 lbs. , pushing/pulling not to exceed 15 lbs., no overhead . If you have any questions or concerns, please don't hesitate to call.     Sincerely,    Bernardo Carballo PA-C

## 2022-05-23 NOTE — PROGRESS NOTES
Dr Lester Bernardo      Date /Time 5/23/2022       3:37 PM EST  Name Nam Oh             1965   Location  [facility]  MRN [id number]                Chief Complaint   Patient presents with    Post-Op Check     LEFT SHOULDER ORIF 2/14/22        History of Present Illness      Nam Oh is a 61 y.o. male is here for post-op visit after left shoulder surgery. Patient is 3+ months status post ORIF left shoulder greater tuberosity fracture and rotator cuff repair. Patient doing well. Pain controlled. Patient denies fever chills or drainage        Physical Exam    Based off 1997 Exam Criteria    Ht 6' (1.829 m)   Wt 282 lb (127.9 kg)   BMI 38.25 kg/m²      Constitutional:       General: He is not in acute distress. Appearance: Normal appearance. LEFT Shoulder: incision clean, intact, healing appropriately. No surrounding  erythema or fluctuance. Neuro intact distal. No evidence of DVT. Imaging       Left Shoulder: [address] [facility]  Radiographs: X-rays were ordered and reviewed of the left shoulder. 3 views. AP, scapular Y, and axillary views. They demonstrate a 1 metallic screw consistent with ORIF greater tuberosity fracture. Fracture and hardware maintaining appropriate position. Assessment and Plan    Nam was seen today for post-op check. Diagnoses and all orders for this visit:    S/P shoulder surgery  -     XR SHOULDER LEFT (MIN 2 VIEWS); Future        Patient is doing well. Patient will continue her physical therapy. Patient will return to work on light duty on May 31, 2022. Anticipated return to work full duty 2 months from now. We will see him back at that time or sooner if problems arise. Light duty should consist of no more than 15 pounds of pushing pulling and carrying. No overhead work. Electronically signed by Shannon Matias PA-C on 5/23/2022 at 3:32 PM  This dictation was generated by voice recognition computer software. Although all attempts are made to edit the dictation for accuracy, there may be errors in the transcription that are not intended.

## 2022-05-24 ENCOUNTER — HOSPITAL ENCOUNTER (OUTPATIENT)
Dept: PHYSICAL THERAPY | Age: 57
Setting detail: THERAPIES SERIES
Discharge: HOME | End: 2022-05-24
Payer: COMMERCIAL

## 2022-05-24 PROCEDURE — 97140 MANUAL THERAPY 1/> REGIONS: CPT | Performed by: PHYSICAL THERAPIST

## 2022-05-24 PROCEDURE — 97110 THERAPEUTIC EXERCISES: CPT | Performed by: PHYSICAL THERAPIST

## 2022-05-24 NOTE — FLOWSHEET NOTE
[facility],  [address]  Phone: [phone]  Fax [phone]        Date:  2022    Patient Name:  Kana Leger    :  1965  MRN: [id number]  Restrictions/Precautions:    Medical/Treatment Diagnosis Information:  · Diagnosis: closed displaced fracture of greater tuberosity left humerus (S42.252D), left shoulder dislocation (S43.005D), s/p left shoulder ORIF and RTC repair DOS 22  · Treatment Diagnosis: left shoulder pain (K91.454)  Insurance/Certification information:  PT Insurance Information: BC  Physician Information:  Referring Practitioner: Vin Hernandez  Has the plan of care been signed (Y/N):        [x]  Yes  []  No     Date of Patient follow up with Physician: 22      Is this a Progress Report:     []  Yes  [x]  No        If Yes:  Date Range for reporting period:  Beginnin22  Ending:     Progress report will be due (10 Rx or 30 days whichever is less):       Recertification will be due (POC Duration  / 90 days whichever is less):       Visit # Insurance Allowable Auth Required   In-person 13 50 []  Yes [x]  No          Functional Scale:  FOTO functional questionnaire 62/100 (62%)    Date assessed:  22      Therapy Diagnosis/Practice Pattern: I      Number of Comorbidities:  [x]0     []1-2    []3+    Latex Allergy:  [x]NO      []YES  Preferred Language for Healthcare:   [x]English       []other:      Pain level:  0-1/10     SUBJECTIVE:  Patient reports that his shoulder feels great. MD said he could go back to work but no lifting more than 15 pounds and nothing overhead. Will be starting next week. OBJECTIVE:   Observation:    Test measurements:  PROM ER 80 at 75 deg    RESTRICTIONS/PRECAUTIONS: Clarified with Dr. Vin Hernandez this date as protocol and MD note state conflicting restrictions  Phase III:   Progression of PROM, to AAROM, to AROM to normalize ROM.  Progress slowly as tolerated. Careful not to over exert direct passive tension on the repair:  oHorizontal abduction  oExternal rotation at multiple angles (45, 75, 90 degrees)  oFunctional internal rotation only as needed   Initiate posterior capsule stretching cross body adduction stretching as needed   No rotator cuff strengthening exercises until 7/14 per protocol. May do scapular, back, and biceps strengthening withlight resistance. Exercises/Interventions:     Therapeutic Ex (15101) Sets/sec Reps Notes/CUES      Supine cane press and flex 5# 5\" 15x    Forward   Sidelying ER 2 15x    sidelying abd 2 15x       Cross body stretch 15\" 5x    Gentle       CC SA Rows 15# 2 10    CC lat pull downs 35# 2 10 Needs cueng   No $ 3\" 2 x 10 Needs cueing   Cueing to avoid shrug   Cueing to avoid shrug   Standing abduction, flexion  to 90 deg  1 10x ea Focusing on avoiding shrug in LUE; mirror for cueing         Bicep curls 7# 2 10          Manual Intervention (15033)      Oscillations for pain, GH Joint mobilizations Gr III 5'     PROM left shoulder flexion, abduction, ER 10'               Needs cueing         NMR re-education (01340)   CUES NEEDED   Needs cueing                                          Therapeutic Activity (13150)                                            HEP instruction:   Access Code: [email or web address]  URL: [email or web address]  Date: 03/22/2022  Prepared by: Jeanna Phillip    Therapeutic Exercise and NMR EXR  [x] (27569) Provided verbal/tactile cueing for activities related to strengthening, flexibility, endurance, ROM  for improvements in scapular, scapulothoracic and UE control with self care, reaching, carrying, lifting, house/yardwork, driving/computer work.     [x] (85935) Provided verbal/tactile cueing for activities related to improving balance, coordination, kinesthetic sense, posture, motor skill, proprioception  to assist with  scapular, scapulothoracic and UE control with self care, reaching, Not Met: [] Adjusted    Therapist goals for Patient:   Short Term Goals: To be achieved in: 2 weeks  1. Independent in HEP and progression per patient tolerance, in order to prevent re-injury. [] Progressing: [x] Met: [] Not Met: [] Adjusted  2. Patient will have a decrease in pain to facilitate improvement in movement, function, and ADLs as indicated by Functional Deficits. [] Progressing: [x] Met: [] Not Met: [] Adjusted    Long Term Goals: To be achieved in: 12 weeks  1. Disability index score of 65% or more for the FOTO functional questionnaire to assist with reaching prior level of function. [x] Progressing: [] Met: [] Not Met: [] Adjusted  2. Patient will demonstrate increased AROM of left shoulder flexion and abduction to 145, functional ER to T2, IR to T10 to allow for proper joint functioning as indicated by patients Functional Deficits. [x] Progressing: [] Met: [] Not Met: [] Adjusted  3. Patient will demonstrate an increase in Strength in left shoulder to grossly 4+/5 to allow for proper functional mobility as indicated by patients Functional Deficits. [x] Progressing: [] Met: [] Not Met: [] Adjusted  4. Patient will be able to drive without increased symptoms or restriction. [] Progressing: [x] Met: [] Not Met: [] Adjusted  5. Patient will be able to lift a 5 pound object above shoulder height without increased symptoms or restriction in his left shoulder. [x] Progressing: [] Met: [] Not Met: [] Adjusted  6. Patient will be able to return to work as a  without increased symptoms or restrictions in his left shoulder.(patient specific functional goal)    [x] Progressing: [] Met: [] Not Met: [] Adjusted     ASSESSMENT:  Patient demonstrates shrug at 90 deg of elevation, with mirror and cueing from PT to focus on form. Patient progressing well but limited with RTC strengthening per protocol.      Overall Progression Towards Functional goals/ Treatment Progress Update:  [x] Patient is progressing as expected towards functional goals listed. [] Progression is slowed due to complexities/Impairments listed. [] Progression has been slowed due to co-morbidities. [] Plan just implemented, too soon to assess goals progression <30days   [] Goals require adjustment due to lack of progress  [] Patient is not progressing as expected and requires additional follow up with physician  [] Other    Prognosis for POC: [x] Good [] Fair  [] Poor      Patient requires continued skilled intervention: [x] Yes  [] No    Treatment/Activity Tolerance:  [x] Patient able to complete treatment  [] Patient limited by fatigue  [] Patient limited by pain    [] Patient limited by other medical complications  [] Other:       PLAN: See eval. Progress per protocol. [x] Continue per plan of care [] Alter current plan (see comments above)  [] Plan of care initiated [] Hold pending MD visit [] Discharge      Electronically signed by:  Delphine Tsai PT, DPT [id number]     Note: If patient does not return for scheduled/ recommended follow up visits, this note will serve as a discharge from care along with most recent update on progress.

## 2022-05-26 ENCOUNTER — HOSPITAL ENCOUNTER (OUTPATIENT)
Dept: PHYSICAL THERAPY | Age: 57
Setting detail: THERAPIES SERIES
Discharge: HOME | End: 2022-05-26
Payer: COMMERCIAL

## 2022-05-26 PROCEDURE — 97110 THERAPEUTIC EXERCISES: CPT | Performed by: PHYSICAL THERAPIST

## 2022-05-26 PROCEDURE — 97140 MANUAL THERAPY 1/> REGIONS: CPT | Performed by: PHYSICAL THERAPIST

## 2022-05-26 NOTE — FLOWSHEET NOTE
[facility],  [address] [place]  Phone: [phone]  Fax [phone]        Date:  2022    Patient Name:  Fide Gregg    :  1965  MRN: [id number]  Restrictions/Precautions:    Medical/Treatment Diagnosis Information:  · Diagnosis: closed displaced fracture of greater tuberosity left humerus (S42.252D), left shoulder dislocation (S43.005D), s/p left shoulder ORIF and RTC repair DOS 22  · Treatment Diagnosis: left shoulder pain (L01.872)  Insurance/Certification information:  PT Insurance Information: [facility]  Physician Information:  Referring Practitioner: Lala Rubio  Has the plan of care been signed (Y/N):        [x]  Yes  []  No     Date of Patient follow up with Physician: 22      Is this a Progress Report:     []  Yes  [x]  No        If Yes:  Date Range for reporting period:  Beginnin22  Ending:     Progress report will be due (10 Rx or 30 days whichever is less): 41      Recertification will be due (POC Duration  / 90 days whichever is less):       Visit # Insurance Allowable Auth Required   In-person 14 50 []  Yes [x]  No          Functional Scale:  FOTO functional questionnaire 62/100 (62%)    Date assessed:  22      Therapy Diagnosis/Practice Pattern: I      Number of Comorbidities:  [x]0     []1-2    []3+    Latex Allergy:  [x]NO      []YES  Preferred Language for Healthcare:   [x]English       []other:      Pain level:  0-1/10     SUBJECTIVE:  Patient reports that his shoulder feels great. MD said he could go back to work but no lifting more than 15 pounds and nothing overhead. Will be starting next week.        OBJECTIVE:   Observation:    Test measurements:  AROM left shoulder flexion 120, abduction 125l PN NV    RESTRICTIONS/PRECAUTIONS: Clarified with Dr. Lala Rubio this date as protocol and MD note state conflicting restrictions  Phase III:   Progression of PROM, to [facility], to AROM to normalize ROM. Progress slowly as tolerated. Careful not to over exert direct passive tension on the repair:  oHorizontal abduction  oExternal rotation at multiple angles (45, 75, 90 degrees)  oFunctional internal rotation only as needed   Initiate posterior capsule stretching cross body adduction stretching as needed   No rotator cuff strengthening exercises until 7/14 per protocol. May do scapular, back, and biceps strengthening withlight resistance. Exercises/Interventions:     Therapeutic Ex (47739) Sets/sec Reps Notes/CUES         Forward   True stretch flexion 10\" 10x gentle   Sidelying ER 5\" 30x    sidelying abd 3\" 30x       Cross body stretch 15\" 5x    Gentle       CC SA Rows 15# 2 15    CC lat pull downs 35# 2 15 Needs cueng   No $ 3\" 2 x 10 Needs cueing   Cueing to avoid shrug   Cueing to avoid shrug   Standing abduction, flexion  to 90 deg  1 10x ea Focusing on avoiding shrug in LUE; mirror for cueing   Tricep press 30# on CC 2 10    Bicep curls 7# 2 10          Manual Intervention (82638)      Oscillations for pain, GH Joint mobilizations Gr III 5'     PROM left shoulder flexion, abduction, ER 8'               Needs cueing         NMR re-education (18368)   CUES NEEDED   Needs cueing                                          Therapeutic Activity (16587)                                            HEP instruction:   Access Code: [email or web address]  URL: [email or web address]  Date: 03/22/2022  Prepared by: Jacob Angulo    Therapeutic Exercise and NMR EXR  [x] (14356) Provided verbal/tactile cueing for activities related to strengthening, flexibility, endurance, ROM  for improvements in scapular, scapulothoracic and UE control with self care, reaching, carrying, lifting, house/yardwork, driving/computer work.     [x] (29054) Provided verbal/tactile cueing for activities related to improving balance, coordination, kinesthetic sense, posture, motor skill, proprioception  to assist with  scapular, scapulothoracic and UE control with self care, reaching, carrying, lifting, house/yardwork, driving/computer work. Therapeutic Activities:    [] (36502 or 63867) Provided verbal/tactile cueing for activities related to improving balance, coordination, kinesthetic sense, posture, motor skill, proprioception and motor activation to allow for proper function of scapular, scapulothoracic and UE control with self care, carrying, lifting, driving/computer work. Home Exercise Program:    [x] (04744) Reviewed/Progressed HEP activities related to strengthening, flexibility, endurance, ROM of scapular, scapulothoracic and UE control with self care, reaching, carrying, lifting, house/yardwork, driving/computer work  [] (19018) Reviewed/Progressed HEP activities related to improving balance, coordination, kinesthetic sense, posture, motor skill, proprioception of scapular, scapulothoracic and UE control with self care, reaching, carrying, lifting, house/yardwork, driving/computer work      Manual Treatments:  PROM / STM / Oscillations-Mobs:  G-I, II, III, IV (PA's, Inf., Post.)  [x] (04993) Provided manual therapy to mobilize soft tissue/joints of cervical/CT, scapular GHJ and UE for the purpose of modulating pain, promoting relaxation,  increasing ROM, reducing/eliminating soft tissue swelling/inflammation/restriction, improving soft tissue extensibility and allowing for proper ROM for normal function with self care, reaching, carrying, lifting, house/yardwork, driving/computer work    Modalities:  Declined.  Will ice at home    Charges  Timed Code Treatment Minutes: 43'   Total Treatment Minutes: 43'     [] EVAL (LOW) 56771   [] EVAL (MOD) 03431   [] EVAL (HIGH) 21802   [] RE-EVAL     [x] WL(29502) x   2  [] IONTO  [] NMR (57754) x     [] VASO  [x] Manual (60455) x  1  [] Other:  [] TA x      [] Mech Traction (93308)  [] ES(attended) (18550)      [] ES (un) (84640):       GOALS:  Patient stated goal: \"Full motion of shoulder and get back to work. \"  [] Progressing: [] Met: [] Not Met: [] Adjusted    Therapist goals for Patient:   Short Term Goals: To be achieved in: 2 weeks  1. Independent in HEP and progression per patient tolerance, in order to prevent re-injury. [] Progressing: [x] Met: [] Not Met: [] Adjusted  2. Patient will have a decrease in pain to facilitate improvement in movement, function, and ADLs as indicated by Functional Deficits. [] Progressing: [x] Met: [] Not Met: [] Adjusted    Long Term Goals: To be achieved in: 12 weeks  1. Disability index score of 65% or more for the FOTO functional questionnaire to assist with reaching prior level of function. [x] Progressing: [] Met: [] Not Met: [] Adjusted  2. Patient will demonstrate increased AROM of left shoulder flexion and abduction to 145, functional ER to T2, IR to T10 to allow for proper joint functioning as indicated by patients Functional Deficits. [x] Progressing: [] Met: [] Not Met: [] Adjusted  3. Patient will demonstrate an increase in Strength in left shoulder to grossly 4+/5 to allow for proper functional mobility as indicated by patients Functional Deficits. [x] Progressing: [] Met: [] Not Met: [] Adjusted  4. Patient will be able to drive without increased symptoms or restriction. [] Progressing: [x] Met: [] Not Met: [] Adjusted  5. Patient will be able to lift a 5 pound object above shoulder height without increased symptoms or restriction in his left shoulder. [x] Progressing: [] Met: [] Not Met: [] Adjusted  6. Patient will be able to return to work as a  without increased symptoms or restrictions in his left shoulder.(patient specific functional goal)    [x] Progressing: [] Met: [] Not Met: [] Adjusted     ASSESSMENT:  Patient challenged with increased hold time on SL exercises. Did well with CC work. Still requires cueing for scap position throughout session.      Overall Progression Towards Functional goals/ Treatment Progress Update:  [x] Patient is progressing as expected towards functional goals listed. [] Progression is slowed due to complexities/Impairments listed. [] Progression has been slowed due to co-morbidities. [] Plan just implemented, too soon to assess goals progression <30days   [] Goals require adjustment due to lack of progress  [] Patient is not progressing as expected and requires additional follow up with physician  [] Other    Prognosis for POC: [x] Good [] Fair  [] Poor      Patient requires continued skilled intervention: [x] Yes  [] No    Treatment/Activity Tolerance:  [x] Patient able to complete treatment  [] Patient limited by fatigue  [] Patient limited by pain    [] Patient limited by other medical complications  [] Other:       PLAN: See eval. Progress per protocol. [x] Continue per plan of care [] Alter current plan (see comments above)  [] Plan of care initiated [] Hold pending MD visit [] Discharge      Electronically signed by:  Hallie Arreguin PT, DPT [id number]     Note: If patient does not return for scheduled/ recommended follow up visits, this note will serve as a discharge from care along with most recent update on progress.

## 2022-07-26 ENCOUNTER — OFFICE VISIT (OUTPATIENT)
Dept: ORTHOPEDIC SURGERY | Age: 57
End: 2022-07-26
Payer: COMMERCIAL

## 2022-07-26 VITALS — HEIGHT: 72 IN | BODY MASS INDEX: 38.19 KG/M2 | WEIGHT: 282 LBS

## 2022-07-26 DIAGNOSIS — Z87.81 STATUS POST OPEN REDUCTION AND INTERNAL FIXATION (ORIF) OF FRACTURE: Primary | ICD-10-CM

## 2022-07-26 DIAGNOSIS — Z98.890 STATUS POST OPEN REDUCTION AND INTERNAL FIXATION (ORIF) OF FRACTURE: Primary | ICD-10-CM

## 2022-07-26 PROCEDURE — 99213 OFFICE O/P EST LOW 20 MIN: CPT | Performed by: ORTHOPAEDIC SURGERY

## 2022-07-26 NOTE — PROGRESS NOTES
Dr Tala Sofia      Date /Time 7/26/2022       3:37 PM EST  Name Nam Colorado             1965   Location  [facility]  MRN [id number]                Chief Complaint   Patient presents with    Shoulder Pain     LEFT SHOULDER ORIF 2/14/22        History of Present Illness      Nam Colorado is a 61 y.o. male is here for post-op visit after left shoulder surgery. Patient is 5+ months status post ORIF left shoulder greater tuberosity fracture and rotator cuff repair. Patient doing well. Pain controlled. Patient denies fever chills or drainage        Physical Exam    Based off 1997 Exam Criteria    Ht 6' (1.829 m)   Wt 282 lb (127.9 kg)   BMI 38.25 kg/m²      Constitutional:       General: He is not in acute distress. Appearance: Normal appearance. LEFT Shoulder: incision clean, intact, healing appropriately. No surrounding  erythema or fluctuance. Neuro intact distal. No evidence of DVT. Patient has 150 degrees of active forward flexion and 50 degrees of external rotation  Imaging       Left Shoulder: [address] [facility]  Radiographs: X-rays were ordered and reviewed of the left shoulder. 3 views. AP, scapular Y, and axillary views. They demonstrate a 1 metallic screw consistent with ORIF greater tuberosity fracture. Fracture and hardware maintaining appropriate position. Assessment and Plan    Nam was seen today for shoulder pain. Diagnoses and all orders for this visit:    Status post open reduction and internal fixation (ORIF) of fracture  -     XR SHOULDER LEFT (MIN 2 VIEWS); Future      Patient is doing extremely well for the injury he had. Would recommend he continue with his home exercises. I have recommended that he follow-up 1 year from surgery or sooner if problems arise.   Continue with home exercise program.    I discussed with Nam ROMIE Pulliam that his history, symptoms, signs, and imaging are most consistent with status post ORIF humerus    We reviewed the natural history of these conditions and treatment options ranging from conservative measures (rest, icing, activity modification, physical therapy, pain meds, cortisone injection) to surgical options. In terms of treatment, I recommended continuing with rest, icing, avoidance of painful activities, NSAIDs or pain meds as tolerated, and physical therapy. If these are not effective, cortisone injection can be considered. We discussed surgical options as well, should conservative measures fail. Electronically signed by Krystin Metz MD on 7/26/2022 at 4:37 PM  This dictation was generated by voice recognition computer software. Although all attempts are made to edit the dictation for accuracy, there may be errors in the transcription that are not intended.

## 2022-08-09 ENCOUNTER — OFFICE VISIT (OUTPATIENT)
Dept: FAMILY MEDICINE CLINIC | Age: 57
End: 2022-08-09
Payer: COMMERCIAL

## 2022-08-09 VITALS
BODY MASS INDEX: 36.62 KG/M2 | SYSTOLIC BLOOD PRESSURE: 136 MMHG | WEIGHT: 270 LBS | DIASTOLIC BLOOD PRESSURE: 80 MMHG | OXYGEN SATURATION: 98 % | HEART RATE: 91 BPM

## 2022-08-09 DIAGNOSIS — M54.42 ACUTE LEFT-SIDED LOW BACK PAIN WITH LEFT-SIDED SCIATICA: Primary | ICD-10-CM

## 2022-08-09 PROCEDURE — 99213 OFFICE O/P EST LOW 20 MIN: CPT | Performed by: FAMILY MEDICINE

## 2022-08-09 RX ORDER — MELOXICAM 15 MG/1
15 TABLET ORAL DAILY
Qty: 30 TABLET | Refills: 0 | Status: SHIPPED | OUTPATIENT
Start: 2022-08-09

## 2022-08-09 RX ORDER — CYCLOBENZAPRINE HCL 10 MG
10 TABLET ORAL 3 TIMES DAILY PRN
Qty: 30 TABLET | Refills: 0 | Status: SHIPPED | OUTPATIENT
Start: 2022-08-09 | End: 2022-08-19

## 2022-08-09 RX ORDER — METHYLPREDNISOLONE 4 MG/1
TABLET ORAL
Qty: 21 TABLET | Refills: 0 | Status: SHIPPED | OUTPATIENT
Start: 2022-08-09 | End: 2022-08-25

## 2022-08-09 ASSESSMENT — PATIENT HEALTH QUESTIONNAIRE - PHQ9
5. POOR APPETITE OR OVEREATING: 0
SUM OF ALL RESPONSES TO PHQ QUESTIONS 1-9: 0
1. LITTLE INTEREST OR PLEASURE IN DOING THINGS: 0
SUM OF ALL RESPONSES TO PHQ9 QUESTIONS 1 & 2: 0
6. FEELING BAD ABOUT YOURSELF - OR THAT YOU ARE A FAILURE OR HAVE LET YOURSELF OR YOUR FAMILY DOWN: 0
SUM OF ALL RESPONSES TO PHQ QUESTIONS 1-9: 0
9. THOUGHTS THAT YOU WOULD BE BETTER OFF DEAD, OR OF HURTING YOURSELF: 0
7. TROUBLE CONCENTRATING ON THINGS, SUCH AS READING THE NEWSPAPER OR WATCHING TELEVISION: 0
3. TROUBLE FALLING OR STAYING ASLEEP: 0
10. IF YOU CHECKED OFF ANY PROBLEMS, HOW DIFFICULT HAVE THESE PROBLEMS MADE IT FOR YOU TO DO YOUR WORK, TAKE CARE OF THINGS AT HOME, OR GET ALONG WITH OTHER PEOPLE: 0
8. MOVING OR SPEAKING SO SLOWLY THAT OTHER PEOPLE COULD HAVE NOTICED. OR THE OPPOSITE, BEING SO FIGETY OR RESTLESS THAT YOU HAVE BEEN MOVING AROUND A LOT MORE THAN USUAL: 0
SUM OF ALL RESPONSES TO PHQ QUESTIONS 1-9: 0
2. FEELING DOWN, DEPRESSED OR HOPELESS: 0
SUM OF ALL RESPONSES TO PHQ QUESTIONS 1-9: 0
4. FEELING TIRED OR HAVING LITTLE ENERGY: 0

## 2022-08-09 ASSESSMENT — ANXIETY QUESTIONNAIRES
2. NOT BEING ABLE TO STOP OR CONTROL WORRYING: 0
1. FEELING NERVOUS, ANXIOUS, OR ON EDGE: 0
GAD7 TOTAL SCORE: 0
3. WORRYING TOO MUCH ABOUT DIFFERENT THINGS: 0
6. BECOMING EASILY ANNOYED OR IRRITABLE: 0
7. FEELING AFRAID AS IF SOMETHING AWFUL MIGHT HAPPEN: 0
IF YOU CHECKED OFF ANY PROBLEMS ON THIS QUESTIONNAIRE, HOW DIFFICULT HAVE THESE PROBLEMS MADE IT FOR YOU TO DO YOUR WORK, TAKE CARE OF THINGS AT HOME, OR GET ALONG WITH OTHER PEOPLE: NOT DIFFICULT AT ALL
5. BEING SO RESTLESS THAT IT IS HARD TO SIT STILL: 0
4. TROUBLE RELAXING: 0

## 2022-08-09 ASSESSMENT — ENCOUNTER SYMPTOMS: BACK PAIN: 1

## 2022-08-09 NOTE — PROGRESS NOTES
Parish Yao is a 61 y.o. male    Chief Complaint   Patient presents with    Back Pain     Lifted sand bags- a while ago at work. HPI:    Back Pain  This is a new problem. The current episode started more than 1 month ago. The problem occurs daily. The problem has been gradually worsening since onset. The pain is present in the lumbar spine. The symptoms are aggravated by bending. Associated symptoms include numbness and tingling. Risk factors include obesity. He has tried nothing for the symptoms. ROS:    Review of Systems   Musculoskeletal:  Positive for back pain. Neurological:  Positive for tingling and numbness. /80   Pulse 91   Wt 270 lb (122.5 kg)   SpO2 98%   BMI 36.62 kg/m²     Physical Exam:    Physical Exam  Constitutional:       General: He is not in acute distress. Appearance: Normal appearance. He is obese. He is not ill-appearing or toxic-appearing. HENT:      Head: Normocephalic. Musculoskeletal:         General: Tenderness (left lumbar spine) present. Neurological:      Mental Status: He is alert. Psychiatric:         Mood and Affect: Mood normal.         Behavior: Behavior normal.         Thought Content: Thought content normal.       Current Outpatient Medications   Medication Sig Dispense Refill    cyclobenzaprine (FLEXERIL) 10 MG tablet Take 1 tablet by mouth 3 times daily as needed for Muscle spasms 30 tablet 0    methylPREDNISolone (MEDROL DOSEPACK) 4 MG tablet Take by mouth. Take with food. 21 tablet 0    meloxicam (MOBIC) 15 MG tablet Take 1 tablet by mouth in the morning. Start after medrol completed.  30 tablet 0    aspirin EC 81 MG EC tablet Take 1 tablet by mouth 2 times daily 60 tablet 0    cephALEXin (KEFLEX) 500 MG capsule Take 1 capsule by mouth 4 times daily 4 capsule 0    ondansetron (ZOFRAN) 4 MG tablet Take 1 tablet by mouth 3 times daily as needed for Nausea or Vomiting 15 tablet 0     No current facility-administered medications for this visit. Assessment:    1. Acute left-sided low back pain with left-sided sciatica        Plan:    1. Acute left-sided low back pain with left-sided sciatica  It appears he has classic sciatica. Try the exercises and ice application. Take the steroid taper with food and he can do Tylenol for any breakthrough pain. - cyclobenzaprine (FLEXERIL) 10 MG tablet; Take 1 tablet by mouth 3 times daily as needed for Muscle spasms  Dispense: 30 tablet; Refill: 0  - methylPREDNISolone (MEDROL DOSEPACK) 4 MG tablet; Take by mouth. Take with food. Dispense: 21 tablet; Refill: 0  - meloxicam (MOBIC) 15 MG tablet; Take 1 tablet by mouth in the morning. Start after medrol completed. Dispense: 30 tablet; Refill: 0    Patient to return to clinic if symptoms worsen or fail to improve.

## 2022-08-25 ENCOUNTER — OFFICE VISIT (OUTPATIENT)
Dept: FAMILY MEDICINE CLINIC | Age: 57
End: 2022-08-25
Payer: COMMERCIAL

## 2022-08-25 VITALS
DIASTOLIC BLOOD PRESSURE: 82 MMHG | BODY MASS INDEX: 39.6 KG/M2 | SYSTOLIC BLOOD PRESSURE: 142 MMHG | HEART RATE: 81 BPM | TEMPERATURE: 98 F | WEIGHT: 292 LBS | OXYGEN SATURATION: 96 %

## 2022-08-25 DIAGNOSIS — M54.42 ACUTE LEFT-SIDED LOW BACK PAIN WITH LEFT-SIDED SCIATICA: Primary | ICD-10-CM

## 2022-08-25 PROCEDURE — 99213 OFFICE O/P EST LOW 20 MIN: CPT | Performed by: STUDENT IN AN ORGANIZED HEALTH CARE EDUCATION/TRAINING PROGRAM

## 2022-08-25 RX ORDER — METHYLPREDNISOLONE 4 MG/1
TABLET ORAL
Qty: 21 TABLET | Refills: 0 | Status: SHIPPED | OUTPATIENT
Start: 2022-08-25 | End: 2022-08-31

## 2022-08-25 ASSESSMENT — PATIENT HEALTH QUESTIONNAIRE - PHQ9
6. FEELING BAD ABOUT YOURSELF - OR THAT YOU ARE A FAILURE OR HAVE LET YOURSELF OR YOUR FAMILY DOWN: 0
8. MOVING OR SPEAKING SO SLOWLY THAT OTHER PEOPLE COULD HAVE NOTICED. OR THE OPPOSITE, BEING SO FIGETY OR RESTLESS THAT YOU HAVE BEEN MOVING AROUND A LOT MORE THAN USUAL: 0
SUM OF ALL RESPONSES TO PHQ9 QUESTIONS 1 & 2: 0
10. IF YOU CHECKED OFF ANY PROBLEMS, HOW DIFFICULT HAVE THESE PROBLEMS MADE IT FOR YOU TO DO YOUR WORK, TAKE CARE OF THINGS AT HOME, OR GET ALONG WITH OTHER PEOPLE: 0
1. LITTLE INTEREST OR PLEASURE IN DOING THINGS: 0
2. FEELING DOWN, DEPRESSED OR HOPELESS: 0
4. FEELING TIRED OR HAVING LITTLE ENERGY: 0
7. TROUBLE CONCENTRATING ON THINGS, SUCH AS READING THE NEWSPAPER OR WATCHING TELEVISION: 0
SUM OF ALL RESPONSES TO PHQ QUESTIONS 1-9: 0
9. THOUGHTS THAT YOU WOULD BE BETTER OFF DEAD, OR OF HURTING YOURSELF: 0
SUM OF ALL RESPONSES TO PHQ QUESTIONS 1-9: 0
SUM OF ALL RESPONSES TO PHQ QUESTIONS 1-9: 0
3. TROUBLE FALLING OR STAYING ASLEEP: 0
5. POOR APPETITE OR OVEREATING: 0
SUM OF ALL RESPONSES TO PHQ QUESTIONS 1-9: 0

## 2022-08-25 ASSESSMENT — ANXIETY QUESTIONNAIRES
7. FEELING AFRAID AS IF SOMETHING AWFUL MIGHT HAPPEN: 0
6. BECOMING EASILY ANNOYED OR IRRITABLE: 0
2. NOT BEING ABLE TO STOP OR CONTROL WORRYING: 0
3. WORRYING TOO MUCH ABOUT DIFFERENT THINGS: 0
1. FEELING NERVOUS, ANXIOUS, OR ON EDGE: 0
GAD7 TOTAL SCORE: 0
5. BEING SO RESTLESS THAT IT IS HARD TO SIT STILL: 0
4. TROUBLE RELAXING: 0
IF YOU CHECKED OFF ANY PROBLEMS ON THIS QUESTIONNAIRE, HOW DIFFICULT HAVE THESE PROBLEMS MADE IT FOR YOU TO DO YOUR WORK, TAKE CARE OF THINGS AT HOME, OR GET ALONG WITH OTHER PEOPLE: NOT DIFFICULT AT ALL

## 2022-08-25 NOTE — PROGRESS NOTES
Patient: Massiel Villegas is a 61 y.o. 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 Has been slowly worsening. Did improve with medrol dose pack back to normal, hwoever then lifting 80 lb bags of concrete 2 weeks ago and re aggravated. Again having radicular symptoms down left leg to ankle. For last two weeks has been using ibuprofen with minimal relief. Does some stretching at work. Was seen by Dr. Albaor Smith 16 days ago and diagnosed with left sided sciatica and given merdrol dose pack, mobic, flexeril. Current Outpatient Medications   Medication Sig Dispense Refill    methylPREDNISolone (MEDROL DOSEPACK) 4 MG tablet Take by mouth. 21 tablet 0    meloxicam (MOBIC) 15 MG tablet Take 1 tablet by mouth in the morning. Start after medrol completed. 30 tablet 0    aspirin EC 81 MG EC tablet Take 1 tablet by mouth 2 times daily 60 tablet 0    cephALEXin (KEFLEX) 500 MG capsule Take 1 capsule by mouth 4 times daily 4 capsule 0    ondansetron (ZOFRAN) 4 MG tablet Take 1 tablet by mouth 3 times daily as needed for Nausea or Vomiting 15 tablet 0     No current facility-administered medications for this visit. Patient's past medical history, surgical history, family history, medications,  andallergies  were all reviewed and updated as appropriate today. Review of Systems  All other systems reviewed and negative    Physical Exam  Musculoskeletal:        Legs:       Comments: Area of bruising with mild tenderness to palpation. No bony tenderness. Not exacerbated with rotation or side bending. HIP ROM intact with feeling of muscular tightness with internal and external rotation.       Vitals:    08/25/22 1422   BP: (!) 142/82   Pulse: 81   Temp: 98 °F (36.7 °C)   SpO2: 96%       Assessment:  Encounter Diagnosis   Name Primary? Acute left-sided low back pain with left-sided sciatica Yes       Plan:  1. Acute left-sided low back pain with left-sided sciatica  Unclear etiology of bruising however appears to be resolving. We will repeat Medrol Dosepak given improvement previously. Now recurrent injury and will refer to physical therapy for further evaluation and treatment. - methylPREDNISolone (MEDROL DOSEPACK) 4 MG tablet; Take by mouth. Dispense: 21 tablet; Refill: 0  - [facility]      No follow-ups on file.

## 2022-08-31 ENCOUNTER — HOSPITAL ENCOUNTER (OUTPATIENT)
Dept: PHYSICAL THERAPY | Age: 57
Setting detail: THERAPIES SERIES
Discharge: HOME | End: 2022-08-31
Payer: COMMERCIAL

## 2022-08-31 PROCEDURE — 97110 THERAPEUTIC EXERCISES: CPT | Performed by: PHYSICAL THERAPIST

## 2022-08-31 PROCEDURE — 97161 PT EVAL LOW COMPLEX 20 MIN: CPT | Performed by: PHYSICAL THERAPIST

## 2022-08-31 PROCEDURE — 97140 MANUAL THERAPY 1/> REGIONS: CPT | Performed by: PHYSICAL THERAPIST

## 2022-08-31 NOTE — PLAN OF CARE
[facility], [address]  Phone: [phone]  Fax [phone]    Cheryl Srivastava    Dear Dr. Xiomara Puri    We had the pleasure of evaluating the following patient for physical therapy services at [address] [place] [address]. A summary of our findings can be found in the initial assessment below. This includes our plan of care. If you have any questions or concerns regarding these findings, please do not hesitate to contact me at the office phone number checked above. Thank you for the referral.       Physician Signature:_______________________________Date:__________________  By signing above (or electronic signature), therapists plan is approved by physician    Patient: Lane Client   : 1965   MRN: [id number]  Referring Physician:       Evaluation Date: 2022      Medical Diagnosis Information:  Diagnosis: M54.42 (ICD-10-CM) - Acute left-sided low back pain with left-sided sciatica   Treatment Diagnosis: LBP with sciatica M54.42                                         Insurance information: PT Insurance Information: BCBS 50 visits total (15 used, 25 left) pays 100% (met deductible)       Precautions/ Contra-indications: none noted    C-SSRS Triggered by Intake questionnaire (Past 2 wk assessment):   [x] No, Questionnaire did not trigger screening.   [] Yes, Patient intake triggered further evaluation      [] C-SSRS Screening completed  [] PCP notified via Plan of Care  [] Emergency services notified     Latex Allergy:  [x]NO      []YES  Preferred Language for Healthcare:   [x]English       []other:    SUBJECTIVE: Patient stated complaint:during  weekend he was moving 50 lb sand bags and \"tweaked\" his back. Went to his primary care MD donte received some steroids. This helped. He then reinjured his back lifting 80 lb bag of concrete on the job a few ago.   Saw his Tech bedside for wound irriagtion   primary care MD and he prescribed steroids again and told him to come to PT. He says the steroids helped a little. He has not had x ray or other radiology tests, and ha not been referred to an orthopedist.      Relevant Medical History:left shoulder surgery 2/14/22 and did PT. 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    Living Status/Prior Level of Function: Independent with ADLs and IADLs,     OBJECTIVE:     AROM     LUMBAR FLEX Finger mid shin    LUMBAR EXT Dec 50%    Sidebend Right WNL, Left dec 50% and cause left hip pain    Rotation      LEFT RIGHT   HIP Flex     HIP Abd     HIP ER     HIP IR     Knee Flex     Knee ext     Hamstring FLEX Decreased mod Decreased min    Piriformis  Decreased  mod Decreased min             Strength  LEFT RIGHT   MfA     TrA     HIP Flexors 5/5 5/5   HIP Abductors     HIP ER     Hip IR     Knee EXT (quad) 5/5 5/5   Knee Flex (HS) 5/5 5/5   Ankle DF 5/5 5/5   Ankle PF     Great Toe Ext       Reflexes/Sensation: deferred   [x]Dermatomes/Myotomes intact    []UE Reflexes     []Normal []Hypo      []Hyper   []LE Reflexes     []Normal []Hypo      []Hyper   []Babinski/Clonus/Hoffmans:    []Other:    Joint mobility:    []Normal    [x]Hypo: decreased lumbar spine   []Hyper    Palpation: no palpable tenderness or deformity noted    Functional Mobility/Transfers: independent    Posture: decreased lumbar lordosis    Bandages/Dressings/Incisions: n/a    Gait: (include devices/WB status) normal    Orthopedic Special Tests: negative slr                       [x] Patient history, allergies, meds reviewed. Medical chart reviewed. See intake form. Review Of Systems (ROS):  [x]Performed Review of systems (Integumentary, CardioPulmonary, Neurological) by intake and observation. Intake form has been scanned into medical record. Patient has been instructed to contact their primary care physician regarding ROS issues if not already being addressed at this time. Co-morbidities/Complexities (which will affect course of rehabilitation):   []None           Arthritic conditions   []Rheumatoid arthritis (M05.9)  []Osteoarthritis (M19.91)   Cardiovascular conditions   []Hypertension (I10)  []Hyperlipidemia (E78.5)  []Angina pectoris (I20)  []Atherosclerosis (I70)   Musculoskeletal conditions   []Disc pathology   []Congenital spine pathologies   []Prior surgical intervention  []Osteoporosis (M81.8)  []Osteopenia (M85.8)   Endocrine conditions   []Hypothyroid (E03.9)  []Hyperthyroid Gastrointestinal conditions   []Constipation (T38.81)   Metabolic conditions   []Morbid obesity (E66.01)  []Diabetes type 1(E10.65) or 2 (E11.65)   []Neuropathy (G60.9)     Pulmonary conditions   []Asthma (J45)  []Coughing   []COPD (J44.9)   Psychological Disorders  []Anxiety (F41.9)  []Depression (F32.9)   []Other:   []Other:          Barriers to/and or personal factors that will affect rehab potential:              []Age  []Sex              []Motivation/Lack of Motivation                        []Co-Morbidities              []Cognitive Function, education/learning barriers              []Environmental, home barriers              [x]profession/work barriers  []past PT/medical experience  []other:  Justification: Patient has a physical job as a      Falls Risk Assessment (30 days):   [x] Falls Risk assessed and no intervention required.   [] Falls Risk assessed and Patient requires intervention due to being higher risk   TUG score (>12s at risk):     [] Falls education provided, including           ASSESSMENT:   Functional Impairments:     [x]Noted including myotome & dermatome dysfunction   [x]Signs/symptoms consistent with post-surgical status including: decreased ROM, strength and function. [x]signs/symptoms consistent with pathology which may benefit from Dry needling     []other:      Prognosis/Rehab Potential:      []Excellent   [x]Good    []Fair   []Poor    Tolerance of evaluation/treatment:    []Excellent   [x]Good    []Fair   []Poor  Physical Therapy Evaluation Complexity Justification  [x] A history of present problem with:  [x] no personal factors and/or comorbidities that impact the plan of care;  []1-2 personal factors and/or comorbidities that impact the plan of care  []3 personal factors and/or comorbidities that impact the plan of care  [x] An examination of body systems using standardized tests and measures addressing any of the following: body structures and functions (impairments), activity limitations, and/or participation restrictions;:  [] a total of 1-2 or more elements   [] a total of 3 or more elements   [x] a total of 4 or more elements   [x] A clinical presentation with:  [x] stable and/or uncomplicated characteristics   [] evolving clinical presentation with changing characteristics  [] unstable and unpredictable characteristics;   [x] Clinical decision making of [x] low, [] moderate, [] high complexity using standardized patient assessment instrument and/or measurable assessment of functional outcome.     [x] EVAL (LOW) 26038 (typically 20 minutes face-to-face)  [] EVAL (MOD) 62731 (typically 30 minutes face-to-face)  [] EVAL (HIGH) 68772 (typically 45 minutes face-to-face)  [] RE-EVAL         PLAN: Begin PT focusing on: proximal hip mobilizations, LB mobs, LB core activation, proximal hip activation, and HEP    Frequency/Duration:  1-2 days per week for 8 Weeks:  Interventions:  [x]  Therapeutic exercise including: strength training, ROM, for LE, Glutes and core   [x]  NMR activation and proprioception for glutes , LE and Core [x]  Manual therapy as indicated for Hip complex, LE and spine to include: Dry Needling/IASTM, STM, PROM, Gr I-IV mobilizations, manipulation. [x]  Modalities as needed that may include: thermal agents, E-stim, Biofeedback, US, iontophoresis as indicated  [x]  Patient education on joint protection, postural re-education, activity modification, progression of HEP. HEP instruction: Access Code: [email or web address]  URL: [employer]/  Date: 08/31/2022  Prepared by: Hernan Epstein    Exercises  Prone Press Up - 1-3 x daily - 7 x weekly - 10 reps - 2 hold  Seated Piriformis Stretch with Trunk Bend - 1-2 x daily - 7 x weekly - 3 reps - 30 hold  Supine Lower Trunk Rotation - 1-2 x daily - 7 x weekly - 10 reps - 5 hold  Supine Hamstring Stretch - 1-2 x daily - 7 x weekly - 5 reps - 30 hold  (see scanned forms)    GOALS:  Patient stated goal: get rid of pain  [] Progressing: [] Met: [] Not Met: [] Adjusted    Therapist goals for Patient:   Short Term Goals: To be achieved in: 2 weeks  1. Independent in HEP and progression per patient tolerance, in order to prevent re-injury. [] Progressing: [] Met: [] Not Met: [] Adjusted  2. Patient will have a decrease in pain to facilitate improvement in movement, function, and ADLs as indicated by Functional Deficits. [] Progressing: [] Met: [] Not Met: [] Adjusted      Long Term Goals: To be achieved in: 8 weeks  1. Disability index score of 22% or more per FOTO to assist with reaching prior level of function. [] Progressing: [] Met: [] Not Met: [] Adjusted  2. Patient will demonstrate increased AROM to WNL, good LS mobility, good hip ROM to allow for proper joint functioning as indicated by patients Functional Deficits. [] Progressing: [] Met: [] Not Met: [] Adjusted  3. Patient will demonstrate an increase in Strength to good proximal hip and core activation to allow for proper functional mobility as indicated by patients Functional Deficits.    [] Progressing: [] Met: [] Not Met: [] Adjusted  4. Patient will return to work without restrictions  without increased symptoms or restriction. [] Progressing: [] Met: [] Not Met: [] Adjusted  5.  Patient will have an elimination of left leg pain  [] Progressing: [] Met: [] Not Met: [] Adjusted       Electronically signed by:  Raquel Perry PT

## 2022-08-31 NOTE — FLOWSHEET NOTE
[facility], [address]  Phone: [phone]  Fax [phone]    Physical Therapy Treatment Note/ Progress Report:           Date:  2022    Patient Name:  Salma Guzman    :  1965  MRN: [id number]  Restrictions/Precautions:    Medical/Treatment Diagnosis Information:  Diagnosis: M54.42 (ICD-10-CM) - Acute left-sided low back pain with left-sided sciatica  Treatment Diagnosis: LBP with sciatica D54.78  Insurance/Certification information:  PT Insurance Information: BCBS 50 visits total (15 used, 35 left) pays 100% (met deductible)  Physician Information:  Dr. Sharp Ask  Has the plan of care been signed (Y/N):        []  Yes  [x]  No     Date of Patient follow up with Physician: none      Is this a Progress Report:     []  Yes  [x]  No        If Yes:  Date Range for reporting period:  Beginning22  Ending22    Progress report will be due (10 Rx or 30 days whichever is less): 3/38/88       Recertification will be due (POC Duration  / 90 days whichever is less): 10/30/22        Visit # Insurance Allowable Auth Required   In-person 1 35 (50 approved but 15 used previously) []  Yes [x]  No    Telehealth   []  Yes []  No    Total            Functional Scale: FOTO 44    Date assessed:  22      Therapy Diagnosis/Practice Pattern:      Number of Comorbidities:  [x]0     []1-2    []3+    Latex Allergy:  [x]NO      []YES  Preferred Language for Healthcare:   [x]English       []other:      Pain level:  4-5/10    SUBJECTIVE:  See eval    OBJECTIVE: See eval  Observation:   Test measurements:      RESTRICTIONS/PRECAUTIONS: none    Exercises/Interventions:     Therapeutic Ex (75086) Sets/reps Notes/CUES   Prone press up 10x Hep (decreased pain in leg from 2/10 to 1/10   Seated fig 4 piriformis stretch 3x30 sec each leg hep   Supine ham stretch 3x30 sec each leg hep   LTR 5 sec 10x each leg hep Manual Intervention ([address] [facility])     Prone manuals (PAs and rotational mobilizations) 9 min                             NMR re-education (11194)  CUES NEEDED                                                Therapeutic Activity (67562)                                   HEP instruction: Access Code: [email or web address]  URL: [email or web address]  Date: 08/31/2022  Prepared by: Michele Rodriguez    Exercises  Prone Press Up - 1-3 x daily - 7 x weekly - 10 reps - 2 hold  Seated Piriformis Stretch with Trunk Bend - 1-2 x daily - 7 x weekly - 3 reps - 30 hold  Supine Lower Trunk Rotation - 1-2 x daily - 7 x weekly - 10 reps - 5 hold  Supine Hamstring Stretch - 1-2 x daily - 7 x weekly - 5 reps - 30 hold  (see scanned forms)    Therapeutic Exercise and NMR EXR  [] (44535) Provided verbal/tactile cueing for activities related to strengthening, flexibility, endurance, ROM  for improvements in proximal hip and core control with self care, mobility, lifting and ambulation.  [] (76774) Provided verbal/tactile cueing for activities related to improving balance, coordination, kinesthetic sense, posture, motor skill, proprioception  to assist with core control in self care, mobility, lifting, and ambulation.      Therapeutic Activities:    [] (42483 or 48457) Provided verbal/tactile cueing for activities related to improving balance, coordination, kinesthetic sense, posture, motor skill, proprioception and motor activation to allow for proper function  with self care and ADLs  [] (25060) Provided training and instruction to the patient for proper core and proximal hip recruitment and positioning with ambulation re-education     Home Exercise Program:    [x] (34132) Reviewed/Progressed HEP activities related to strengthening, flexibility, endurance, ROM of core, proximal hip and LE for functional self-care, mobility, lifting and ambulation   [] (39635) Reviewed/Progressed HEP activities related to improving balance, coordination, kinesthetic sense, posture, motor skill, proprioception of core, proximal hip and LE for self care, mobility, lifting, and ambulation      Manual Treatments:  PROM / STM / Oscillations-Mobs:  G-I, II, III, IV (PA's, Inf., Post.)  [x] (43015) Provided manual therapy to mobilize proximal hip and LS spine soft tissue/joints for the purpose of modulating pain, promoting relaxation,  increasing ROM, reducing/eliminating soft tissue swelling/inflammation/restriction, improving soft tissue extensibility and allowing for proper ROM for normal function with self care, mobility, lifting and ambulation. Modalities:       Charges  Timed Code Treatment Minutes: 25   Total Treatment Minutes: 50           [x] EVAL (LOW) 53506   [] EVAL (MOD) 17654   [] EVAL (HIGH) 59029   [] RE-EVAL     [x] NH(79728) x   1  [] IONTO  [] NMR (64158) x     [] VASO  [x] Manual (50187) x   1   [] Other:  [] TA x      [] Mech Traction (01820)  [] ES(attended) (87274)      [] ES (un) (76625):     Goals: GOALS:  Patient stated goal: get rid of pain  [] Progressing: [] Met: [] Not Met: [] Adjusted    Therapist goals for Patient:   Short Term Goals: To be achieved in: 2 weeks  1. Independent in HEP and progression per patient tolerance, in order to prevent re-injury. [] Progressing: [] Met: [] Not Met: [] Adjusted  2. Patient will have a decrease in pain to facilitate improvement in movement, function, and ADLs as indicated by Functional Deficits. [] Progressing: [] Met: [] Not Met: [] Adjusted      Long Term Goals: To be achieved in: 8 weeks  1. Disability index score of 22% or more per FOTO to assist with reaching prior level of function. [] Progressing: [] Met: [] Not Met: [] Adjusted  2. Patient will demonstrate increased AROM to WNL, good LS mobility, good hip ROM to allow for proper joint functioning as indicated by patients Functional Deficits. [] Progressing: [] Met: [] Not Met: [] Adjusted  3.  Patient will demonstrate an increase in Strength to good proximal hip and core activation to allow for proper functional mobility as indicated by patients Functional Deficits. [] Progressing: [] Met: [] Not Met: [] Adjusted  4. Patient will return to work without restrictions  without increased symptoms or restriction. [] Progressing: [] Met: [] Not Met: [] Adjusted  5. Patient will have an elimination of left leg pain  [] Progressing: [] Met: [] Not Met: [] Adjusted    (cut and paste from eval)    Progression Towards Functional goals:  [] Patient is progressing as expected towards functional goals listed. [] Progression is slowed due to complexities listed. [] Progression has been slowed due to co-morbidities. [x] Plan just implemented, too soon to assess goals progression  [] Other:     Overall Progression Towards Functional goals/ Treatment Progress Update:  [] Patient is progressing as expected towards functional goals listed. [] Progression is slowed due to complexities/Impairments listed. [] Progression has been slowed due to co-morbidities. [x] Plan just implemented, too soon to assess goals progression <30days   [] Goals require adjustment due to lack of progress  [] Patient is not progressing as expected and requires additional follow up with physician  [] Other    Prognosis for POC: [x] Good [] Fair  [] Poor      Patient requires continued skilled intervention: [x] Yes  [] No    Treatment/Activity Tolerance:  [x] Patient able to complete treatment  [] Patient limited by fatigue  [] Patient limited by pain    [] Patient limited by other medical complications  [] Other:     ASSESSMENT: prone press ups temporarily decreased left leg pain and loosened back. He displays decreased flexibility L>R piriformis and hamstring, decreased lumbar mobility.   He could also benefit from core stabilization program         PLAN: See eval  [] Continue per plan of care [] Alter current plan (see comments above)  [x] Plan of care initiated [] Hold pending MD visit [] Discharge      Electronically signed by:  Aleja Ceja PT    Note: If patient does not return for scheduled/ recommended follow up visits, this note will serve as a discharge from care along with most recent update on progress.

## 2022-09-07 ENCOUNTER — TELEPHONE (OUTPATIENT)
Dept: FAMILY MEDICINE CLINIC | Age: 57
End: 2022-09-07

## 2022-09-07 DIAGNOSIS — M54.16 LUMBAR RADICULOPATHY: Primary | ICD-10-CM

## 2022-09-07 NOTE — TELEPHONE ENCOUNTER
Nam's wife Faith Kehr called the office back regarding this. She states that Nam is in excruciating pain and if possible they would like something called into [facility] on [facility].

## 2022-09-07 NOTE — TELEPHONE ENCOUNTER
Pts wife called in and said her  has been here twice in two months for back pain she said physical therapy is making the back problem worse and she said last time this happen Dr. Crescencio Bean was able to prescription something strong that helped with pt problem. Please advise.

## 2022-09-08 ENCOUNTER — HOSPITAL ENCOUNTER (OUTPATIENT)
Dept: GENERAL RADIOLOGY | Age: 57
Discharge: HOME | End: 2022-09-08
Payer: COMMERCIAL

## 2022-09-08 ENCOUNTER — HOSPITAL ENCOUNTER (OUTPATIENT)
Dept: PHYSICAL THERAPY | Age: 57
Setting detail: THERAPIES SERIES
Discharge: HOME | End: 2022-09-08
Payer: COMMERCIAL

## 2022-09-08 ENCOUNTER — HOSPITAL ENCOUNTER (OUTPATIENT)
Age: 57
Discharge: HOME | End: 2022-09-08
Payer: COMMERCIAL

## 2022-09-08 DIAGNOSIS — M54.16 LUMBAR RADICULOPATHY: ICD-10-CM

## 2022-09-08 PROCEDURE — G0283 ELEC STIM OTHER THAN WOUND: HCPCS | Performed by: PHYSICAL THERAPIST

## 2022-09-08 PROCEDURE — 72110 X-RAY EXAM L-2 SPINE 4/>VWS: CPT

## 2022-09-08 PROCEDURE — 97110 THERAPEUTIC EXERCISES: CPT | Performed by: PHYSICAL THERAPIST

## 2022-09-08 PROCEDURE — 97140 MANUAL THERAPY 1/> REGIONS: CPT | Performed by: PHYSICAL THERAPIST

## 2022-09-08 NOTE — FLOWSHEET NOTE
[facility], [address]  Phone: [phone]  Fax [phone]    Physical Therapy Treatment Note/ Progress Report:           Date:  2022    Patient Name:  Lela Posey    :  1965  MRN: [id number]  Restrictions/Precautions:    Medical/Treatment Diagnosis Information:  Diagnosis: M54.42 (ICD-10-CM) - Acute left-sided low back pain with left-sided sciatica  Treatment Diagnosis: LBP with sciatica C82.81  Insurance/Certification information:  PT Insurance Information: BCBS 50 visits total (15 used, 35 left) pays 100% (met deductible)  Physician Information:  Dr. Larisa Simms  Has the plan of care been signed (Y/N):        [x]  Yes  []  No     Date of Patient follow up with Physician: none      Is this a Progress Report:     []  Yes  [x]  No        If Yes:  Date Range for reporting period:  Beginning22  Ending22    Progress report will be due (10 Rx or 30 days whichever is less): 3/30/95       Recertification will be due (POC Duration  / 90 days whichever is less): 10/30/22        Visit # Insurance Allowable Auth Required   In-person 2 35 (50 approved but 15 used previously) []  Yes [x]  No    Telehealth   []  Yes []  No    Total            Functional Scale: FOTO 44    Date assessed:  22      Therapy Diagnosis/Practice Pattern:      Number of Comorbidities:  [x]0     []1-2    []3+    Latex Allergy:  [x]NO      []YES  Preferred Language for Healthcare:   [x]English       []other:      Pain level:  8/10    SUBJECTIVE:  Patient reports that his back is still really bothering him. Exercises don't hurt while he is doing them but after he seems to be in pain. OBJECTIVE:   Observation: patient arrived 13' late this date. Limited session as a result. Present with lateral shift. Difficulty describing and reporting symptoms when asked.    Test measurements:  NT this date    RESTRICTIONS/PRECAUTIONS: none    Exercises/Interventions:     Therapeutic Ex (29689) Sets/reps Notes/CUES   Standing lateral shift correction vs wall  15x HEP   Prone prop 30\" x 2 HEP   Prone press up 8 x 3\" Started to increase pain at rep 8   Prone TA  5\" x 5 gentle        Patient ed  Centralization of symptoms, prone lying, prone progression without peripheralization, ice                                 Manual Intervention (76630)     IASTM to T/L paraspinals 5'    Lumbar PA's, unilateral PA's 10'                        NMR re-education (08889)  CUES NEEDED                                                Therapeutic Activity (47781)                                   HEP instruction: Access Code: [email or web address]  URL: [email or web address]  Date: 08/31/2022  Prepared by: Juvenal Luna    Exercises  Prone Press Up - 1-3 x daily - 7 x weekly - 10 reps - 2 hold  Seated Piriformis Stretch with Trunk Bend - 1-2 x daily - 7 x weekly - 3 reps - 30 hold  Supine Lower Trunk Rotation - 1-2 x daily - 7 x weekly - 10 reps - 5 hold  Supine Hamstring Stretch - 1-2 x daily - 7 x weekly - 5 reps - 30 hold  (see scanned forms)    Therapeutic Exercise and NMR EXR  [x] (02134) Provided verbal/tactile cueing for activities related to strengthening, flexibility, endurance, ROM  for improvements in proximal hip and core control with self care, mobility, lifting and ambulation. [x] (59707) Provided verbal/tactile cueing for activities related to improving balance, coordination, kinesthetic sense, posture, motor skill, proprioception  to assist with core control in self care, mobility, lifting, and ambulation.      Therapeutic Activities:    [] (07470 or 07683) Provided verbal/tactile cueing for activities related to improving balance, coordination, kinesthetic sense, posture, motor skill, proprioception and motor activation to allow for proper function  with self care and ADLs  [] (43299) Provided training and instruction to the patient for proper core and proximal hip recruitment and positioning with ambulation re-education     Home Exercise Program:    [x] (22722) Reviewed/Progressed HEP activities related to strengthening, flexibility, endurance, ROM of core, proximal hip and LE for functional self-care, mobility, lifting and ambulation   [] (86419) Reviewed/Progressed HEP activities related to improving balance, coordination, kinesthetic sense, posture, motor skill, proprioception of core, proximal hip and LE for self care, mobility, lifting, and ambulation      Manual Treatments:  PROM / STM / Oscillations-Mobs:  G-I, II, III, IV (PA's, Inf., Post.)  [x] (28315) Provided manual therapy to mobilize proximal hip and LS spine soft tissue/joints for the purpose of modulating pain, promoting relaxation,  increasing ROM, reducing/eliminating soft tissue swelling/inflammation/restriction, improving soft tissue extensibility and allowing for proper ROM for normal function with self care, mobility, lifting and ambulation. Modalities:   PM/CP x 15' while prone to lumbar spine    Charges  Timed Code Treatment Minutes: 30'   Total Treatment Minutes: 39'           [] EVAL (LOW) 98078   [] EVAL (MOD) 26736   [] EVAL (HIGH) 72707   [] RE-EVAL     [x] YL(47514) x   1  [] IONTO  [] NMR (18189) x     [] VASO  [x] Manual (30808) x   1   [] Other:  [] TA x      [] Mech Traction (76548)  [] ES(attended) (70358)      [x] ES (un) (92357):     Goals: GOALS:  Patient stated goal: get rid of pain  [] Progressing: [] Met: [] Not Met: [] Adjusted    Therapist goals for Patient:   Short Term Goals: To be achieved in: 2 weeks  1. Independent in HEP and progression per patient tolerance, in order to prevent re-injury. [] Progressing: [] Met: [] Not Met: [] Adjusted  2. Patient will have a decrease in pain to facilitate improvement in movement, function, and ADLs as indicated by Functional Deficits. [] Progressing: [] Met: [] Not Met: [] Adjusted      Long Term Goals:  To be achieved in: 8 weeks  1. Disability index score of 22% or more per FOTO to assist with reaching prior level of function. [] Progressing: [] Met: [] Not Met: [] Adjusted  2. Patient will demonstrate increased AROM to WNL, good LS mobility, good hip ROM to allow for proper joint functioning as indicated by patients Functional Deficits. [] Progressing: [] Met: [] Not Met: [] Adjusted  3. Patient will demonstrate an increase in Strength to good proximal hip and core activation to allow for proper functional mobility as indicated by patients Functional Deficits. [] Progressing: [] Met: [] Not Met: [] Adjusted  4. Patient will return to work without restrictions  without increased symptoms or restriction. [] Progressing: [] Met: [] Not Met: [] Adjusted  5. Patient will have an elimination of left leg pain  [] Progressing: [] Met: [] Not Met: [] Adjusted    (cut and paste from eval)    Progression Towards Functional goals:  [] Patient is progressing as expected towards functional goals listed. [] Progression is slowed due to complexities listed. [] Progression has been slowed due to co-morbidities. [x] Plan just implemented, too soon to assess goals progression  [] Other:     Overall Progression Towards Functional goals/ Treatment Progress Update:  [] Patient is progressing as expected towards functional goals listed. [] Progression is slowed due to complexities/Impairments listed. [] Progression has been slowed due to co-morbidities.   [x] Plan just implemented, too soon to assess goals progression <30days   [] Goals require adjustment due to lack of progress  [] Patient is not progressing as expected and requires additional follow up with physician  [] Other    Prognosis for POC: [x] Good [] Fair  [] Poor      Patient requires continued skilled intervention: [x] Yes  [] No    Treatment/Activity Tolerance:  [x] Patient able to complete treatment  [] Patient limited by fatigue  [] Patient limited by pain    [] Patient limited by other medical complications  [] Other:     ASSESSMENT: Patient presents with lateral shift with radiating pain into LLE. Attempted to centralize but difficult due to patient being vague about specifics of symptoms. Patient arrived late to PT today, so time was spent educating him on centralization vs peripheralization, body mechanics, and TA activation to avoid aggravation of symptoms. Patient instructed to focus on prone positioning, props, press ups, and TA as able without peripheralization. PLAN: See eval  [x] Continue per plan of care [] Alter current plan (see comments above)  [] Plan of care initiated [] Hold pending MD visit [] Discharge      Electronically signed by:  Gabrielle Ann PT, DPT [id number]     Note: If patient does not return for scheduled/ recommended follow up visits, this note will serve as a discharge from care along with most recent update on progress.

## 2022-09-12 DIAGNOSIS — M54.16 CHRONIC LEFT-SIDED LUMBAR RADICULOPATHY: Primary | ICD-10-CM

## 2022-09-12 RX ORDER — TRAMADOL HYDROCHLORIDE 50 MG/1
50 TABLET ORAL EVERY 6 HOURS PRN
Qty: 30 TABLET | Refills: 0 | Status: SHIPPED | OUTPATIENT
Start: 2022-09-12 | End: 2022-09-27

## 2022-09-12 RX ORDER — PREDNISONE 20 MG/1
TABLET ORAL
Qty: 21 TABLET | Refills: 0 | Status: SHIPPED | OUTPATIENT
Start: 2022-09-12

## 2022-09-14 ENCOUNTER — HOSPITAL ENCOUNTER (OUTPATIENT)
Dept: PHYSICAL THERAPY | Age: 57
Setting detail: THERAPIES SERIES
Discharge: HOME | End: 2022-09-14
Payer: COMMERCIAL

## 2022-09-14 PROCEDURE — 97140 MANUAL THERAPY 1/> REGIONS: CPT | Performed by: PHYSICAL THERAPIST

## 2022-09-14 PROCEDURE — 97110 THERAPEUTIC EXERCISES: CPT | Performed by: PHYSICAL THERAPIST

## 2022-09-14 NOTE — FLOWSHEET NOTE
[facility], [address]  Phone: [phone]  Fax [phone]    Physical Therapy Treatment Note/ Progress Report:           Date:  2022    Patient Name:  Rylan Francisco    :  1965  MRN: [id number]  Restrictions/Precautions:    Medical/Treatment Diagnosis Information:  Diagnosis: M54.42 (ICD-10-CM) - Acute left-sided low back pain with left-sided sciatica  Treatment Diagnosis: LBP with sciatica G27.75  Insurance/Certification information:  PT Insurance Information: BCBS 50 visits total (15 used, 35 left) pays 100% (met deductible)  Physician Information:  Dr. Andrés Reid  Has the plan of care been signed (Y/N):        [x]  Yes  []  No     Date of Patient follow up with Physician: none      Is this a Progress Report:     []  Yes  [x]  No        If Yes:  Date Range for reporting period:  Beginning22  Ending22    Progress report will be due (10 Rx or 30 days whichever is less):        Recertification will be due (POC Duration  / 90 days whichever is less): 10/30/22        Visit # Insurance Allowable Auth Required   In-person 3 35 (50 approved but 15 used previously) []  Yes [x]  No    Telehealth   []  Yes []  No    Total            Functional Scale: FOTO 44    Date assessed:  22      Therapy Diagnosis/Practice Pattern:      Number of Comorbidities:  [x]0     []1-2    []3+    Latex Allergy:  [x]NO      []YES  Preferred Language for Healthcare:   [x]English       []other:      Pain level:  5/10    SUBJECTIVE:  Patient reports that he felt great after first session, but had pain after last session. Currently having pain down the leg as usual.  Having an MRI 22    OBJECTIVE:   Observation:Present with lateral shift. Difficulty describing and reporting symptoms when asked.    Test measurements:  NT this date    RESTRICTIONS/PRECAUTIONS: none    Exercises/Interventions:     Therapeutic Ex (99197) Sets/reps Notes/CUES   Standing lateral shift correction vs wall  15x HEP   HEP   Started to increase pain at rep 8   gentle   Hooklying lumbar traction 15 sec 5x Add to hep 9/14        Patient ed 5 min  Also discussed seated self traction in a chair Centralization of symptoms, prone lying, prone progression without peripheralization, ice                                 Manual Intervention (68819)        Lumbar PA's, unilateral PA's, supine long axis left leg distraction, prone knee flexion stretches 30' Left leg distraction eliminated all pain but it returned upon standing                       NMR re-education (03795)  CUES NEEDED                                                Therapeutic Activity (13888)                                     Access Code: [email or web address]  URL: [email or web address]  Date: 09/14/2022  Prepared by: Deri Neither    Exercises  Prone Press Up - 1-3 x daily - 7 x weekly - 10 reps - 2 hold  Seated Piriformis Stretch with Trunk Bend - 1-2 x daily - 7 x weekly - 3 reps - 30 hold  Supine Lower Trunk Rotation - 1-2 x daily - 7 x weekly - 10 reps - 5 hold  Supine Hamstring Stretch - 1-2 x daily - 7 x weekly - 5 reps - 30 hold  Hooklying Lumbar Traction - 1-3 x daily - 7 x weekly - 5-10 reps - 15 hold        Therapeutic Exercise and NMR EXR  [x] (62417) Provided verbal/tactile cueing for activities related to strengthening, flexibility, endurance, ROM  for improvements in proximal hip and core control with self care, mobility, lifting and ambulation. [x] (51724) Provided verbal/tactile cueing for activities related to improving balance, coordination, kinesthetic sense, posture, motor skill, proprioception  to assist with core control in self care, mobility, lifting, and ambulation.      Therapeutic Activities:    [] (93150 or 32186) Provided verbal/tactile cueing for activities related to improving balance, coordination, kinesthetic sense, posture, motor skill, proprioception and motor to facilitate improvement in movement, function, and ADLs as indicated by Functional Deficits. [] Progressing: [] Met: [] Not Met: [] Adjusted      Long Term Goals: To be achieved in: 8 weeks  1. Disability index score of 22% or more per FOTO to assist with reaching prior level of function. [] Progressing: [] Met: [] Not Met: [] Adjusted  2. Patient will demonstrate increased AROM to WNL, good LS mobility, good hip ROM to allow for proper joint functioning as indicated by patients Functional Deficits. [] Progressing: [] Met: [] Not Met: [] Adjusted  3. Patient will demonstrate an increase in Strength to good proximal hip and core activation to allow for proper functional mobility as indicated by patients Functional Deficits. [] Progressing: [] Met: [] Not Met: [] Adjusted  4. Patient will return to work without restrictions  without increased symptoms or restriction. [] Progressing: [] Met: [] Not Met: [] Adjusted  5. Patient will have an elimination of left leg pain  [] Progressing: [] Met: [] Not Met: [] Adjusted    (cut and paste from eval)    Progression Towards Functional goals:  [] Patient is progressing as expected towards functional goals listed. [] Progression is slowed due to complexities listed. [] Progression has been slowed due to co-morbidities. [x] Plan just implemented, too soon to assess goals progression  [] Other:     Overall Progression Towards Functional goals/ Treatment Progress Update:  [] Patient is progressing as expected towards functional goals listed. [] Progression is slowed due to complexities/Impairments listed. [] Progression has been slowed due to co-morbidities.   [x] Plan just implemented, too soon to assess goals progression <30days   [] Goals require adjustment due to lack of progress  [] Patient is not progressing as expected and requires additional follow up with physician  [] Other    Prognosis for POC: [x] Good [] Fair  [] Poor      Patient requires continued skilled intervention: [x] Yes  [] No    Treatment/Activity Tolerance:  [x] Patient able to complete treatment  [] Patient limited by fatigue  [] Patient limited by pain    [] Patient limited by other medical complications  [] Other:     ASSESSMENT: Patient presents with lateral shift with radiating pain into LLE. Attempted to centralize but caused increased pain. He respond well to long axis left leg distraction and hooklying self traction was add to HEP. Patient instructed to focus on prone positioning, props, press ups, and TA as able without peripheralization. Upon leaving PT his pain reduced from a 5/10 to 1/10, but lateral shift persisted. PLAN: See eval  [x] Continue per plan of care [] Alter current plan (see comments above)  [] Plan of care initiated [] Hold pending MD visit [] Discharge      Electronically signed by:  Suzanne Alvarez, PT    Note: If patient does not return for scheduled/ recommended follow up visits, this note will serve as a discharge from care along with most recent update on progress.

## 2022-09-20 ENCOUNTER — HOSPITAL ENCOUNTER (OUTPATIENT)
Dept: PHYSICAL THERAPY | Age: 57
Setting detail: THERAPIES SERIES
Discharge: HOME | End: 2022-09-20
Payer: COMMERCIAL

## 2022-09-20 PROCEDURE — 97110 THERAPEUTIC EXERCISES: CPT | Performed by: PHYSICAL THERAPIST

## 2022-09-20 PROCEDURE — 97140 MANUAL THERAPY 1/> REGIONS: CPT | Performed by: PHYSICAL THERAPIST

## 2022-09-20 NOTE — FLOWSHEET NOTE
[facility] [address]  Phone: [phone]  Fax [phone]    Physical Therapy Treatment Note/ Progress Report:           Date:  2022    Patient Name:  Belkys Franklin    :  1965  MRN: [id number]  Restrictions/Precautions:    Medical/Treatment Diagnosis Information:  Diagnosis: M54.42 (ICD-10-CM) - Acute left-sided low back pain with left-sided sciatica  Treatment Diagnosis: LBP with sciatica K66.05  Insurance/Certification information:  PT Insurance Information: [employer] 50 visits total (15 used, 35 left) pays 100% (met deductible)  Physician Information:  Dr. Jojo Ware  Has the plan of care been signed (Y/N):        [x]  Yes  []  No     Date of Patient follow up with Physician: none      Is this a Progress Report:     []  Yes  [x]  No        If Yes:  Date Range for reporting period:  Beginning22  Ending22    Progress report will be due (10 Rx or 30 days whichever is less): 48       Recertification will be due (POC Duration  / 90 days whichever is less): 10/30/22        Visit # Insurance Allowable Auth Required   In-person 4 35 (50 approved but 15 used previously) []  Yes [x]  No    Telehealth   []  Yes []  No    Total            Functional Scale: FOTO 44    Date assessed:  22      Therapy Diagnosis/Practice Pattern:      Number of Comorbidities:  [x]0     []1-2    []3+    Latex Allergy:  [x]NO      []YES  Preferred Language for Healthcare:   [x]English       []other:      Pain level:  5/10    SUBJECTIVE:  Patient reports that his pain is relatively the same as last visit. Pain fluctuates depending on what he does. Walking longer distances hurts. Today pain is in his back and down is leg to the back of his knee. No pain in the lower leg or foot. OBJECTIVE: patient arrived 14-23' late this date.  Short session as a result  Observation: minimal to no lateral shift noted this date  Test measurements:  NT this date    RESTRICTIONS/PRECAUTIONS: none    Exercises/Interventions:     Therapeutic Ex (28038) Sets/reps Notes/CUES   HEP   HEP   Started to increase pain at rep 8   gentle   Figure 4 stretch 20\" x 3 ea R/L    HL TA 5\" x 10    HL TA with ADD 5\" x 10         Patient ed  Centralization of symptoms, abdominal bracing during work and functional mobility, ice                       Manual Intervention ([phone])        Lumbar PA's, unilateral PA's, supine long axis left leg distraction, 15' Left leg distraction eliminated all pain but it returned upon standing                       NMR re-education (38602)  CUES NEEDED                                                Therapeutic Activity (19666)                                     Access Code: [email or web address]  URL: [email or web address]  Date: 09/14/2022  Prepared by: Deepika Neither    Exercises  Prone Press Up - 1-3 x daily - 7 x weekly - 10 reps - 2 hold  Seated Piriformis Stretch with Trunk Bend - 1-2 x daily - 7 x weekly - 3 reps - 30 hold  Supine Lower Trunk Rotation - 1-2 x daily - 7 x weekly - 10 reps - 5 hold  Supine Hamstring Stretch - 1-2 x daily - 7 x weekly - 5 reps - 30 hold  Hooklying Lumbar Traction - 1-3 x daily - 7 x weekly - 5-10 reps - 15 hold        Therapeutic Exercise and NMR EXR  [x] (62477) Provided verbal/tactile cueing for activities related to strengthening, flexibility, endurance, ROM  for improvements in proximal hip and core control with self care, mobility, lifting and ambulation. [x] (78531) Provided verbal/tactile cueing for activities related to improving balance, coordination, kinesthetic sense, posture, motor skill, proprioception  to assist with core control in self care, mobility, lifting, and ambulation.      Therapeutic Activities:    [] (74249 or 42009) Provided verbal/tactile cueing for activities related to improving balance, coordination, kinesthetic sense, posture, motor skill, proprioception and motor activation to allow for proper function  with self care and ADLs  [] (53816) Provided training and instruction to the patient for proper core and proximal hip recruitment and positioning with ambulation re-education     Home Exercise Program:    [x] (26357) Reviewed/Progressed HEP activities related to strengthening, flexibility, endurance, ROM of core, proximal hip and LE for functional self-care, mobility, lifting and ambulation   [] (05698) Reviewed/Progressed HEP activities related to improving balance, coordination, kinesthetic sense, posture, motor skill, proprioception of core, proximal hip and LE for self care, mobility, lifting, and ambulation      Manual Treatments:  PROM / STM / Oscillations-Mobs:  G-I, II, III, IV (PA's, Inf., Post.)  [x] (10047) Provided manual therapy to mobilize proximal hip and LS spine soft tissue/joints for the purpose of modulating pain, promoting relaxation,  increasing ROM, reducing/eliminating soft tissue swelling/inflammation/restriction, improving soft tissue extensibility and allowing for proper ROM for normal function with self care, mobility, lifting and ambulation. Modalities:    declined stim as he thought that may have aggravate his back. Seated MH x15 min    Charges  Timed Code Treatment Minutes: 25'   Total Treatment Minutes: 25'           [] EVAL (LOW) 03372   [] EVAL (MOD) 43656   [] EVAL (HIGH) 88845   [] RE-EVAL     [x] XB(25877) x   1  [] IONTO  [] NMR (81316) x     [] VASO  [x] Manual (57594) x   1  [] Other:  [] TA x      [] Mech Traction (40600)  [] ES(attended) (98889)      [] ES (un) (50858):     Goals: GOALS:  Patient stated goal: get rid of pain  [] Progressing: [] Met: [] Not Met: [] Adjusted    Therapist goals for Patient:   Short Term Goals: To be achieved in: 2 weeks  1. Independent in HEP and progression per patient tolerance, in order to prevent re-injury. [] Progressing: [] Met: [] Not Met: [] Adjusted  2.  Patient will have a decrease in pain to facilitate improvement in movement, function, and ADLs as indicated by Functional Deficits. [] Progressing: [] Met: [] Not Met: [] Adjusted      Long Term Goals: To be achieved in: 8 weeks  1. Disability index score of 22% or more per FOTO to assist with reaching prior level of function. [] Progressing: [] Met: [] Not Met: [] Adjusted  2. Patient will demonstrate increased AROM to WNL, good LS mobility, good hip ROM to allow for proper joint functioning as indicated by patients Functional Deficits. [] Progressing: [] Met: [] Not Met: [] Adjusted  3. Patient will demonstrate an increase in Strength to good proximal hip and core activation to allow for proper functional mobility as indicated by patients Functional Deficits. [] Progressing: [] Met: [] Not Met: [] Adjusted  4. Patient will return to work without restrictions  without increased symptoms or restriction. [] Progressing: [] Met: [] Not Met: [] Adjusted  5. Patient will have an elimination of left leg pain  [] Progressing: [] Met: [] Not Met: [] Adjusted    (cut and paste from eval)    Progression Towards Functional goals:  [] Patient is progressing as expected towards functional goals listed. [] Progression is slowed due to complexities listed. [] Progression has been slowed due to co-morbidities. [x] Plan just implemented, too soon to assess goals progression  [] Other:     Overall Progression Towards Functional goals/ Treatment Progress Update:  [] Patient is progressing as expected towards functional goals listed. [] Progression is slowed due to complexities/Impairments listed. [] Progression has been slowed due to co-morbidities.   [x] Plan just implemented, too soon to assess goals progression <30days   [] Goals require adjustment due to lack of progress  [] Patient is not progressing as expected and requires additional follow up with physician  [] Other    Prognosis for POC: [x] Good [] Fair  [] Poor      Patient requires continued skilled intervention: [x] Yes  [] No    Treatment/Activity Tolerance:  [x] Patient able to complete treatment  [] Patient limited by fatigue  [] Patient limited by pain    [] Patient limited by other medical complications  [] Other:     ASSESSMENT: Short session again this date due to patient arriving late. Patient with less distal symptoms as compared to the last couple of weeks. Educated patient on TA activation and abdominal bracing during work and general functional mobility. PLAN: See eval  [x] Continue per plan of care [] Alter current plan (see comments above)  [] Plan of care initiated [] Hold pending MD visit [] Discharge      Electronically signed by:  Misha Rodríguez PT, DPT [id number]     Note: If patient does not return for scheduled/ recommended follow up visits, this note will serve as a discharge from care along with most recent update on progress.

## 2022-09-26 ENCOUNTER — HOSPITAL ENCOUNTER (OUTPATIENT)
Dept: MRI IMAGING | Age: 57
Discharge: HOME | End: 2022-09-26
Payer: COMMERCIAL

## 2022-09-26 DIAGNOSIS — M54.16 CHRONIC LEFT-SIDED LUMBAR RADICULOPATHY: ICD-10-CM

## 2022-09-26 PROCEDURE — 72148 MRI LUMBAR SPINE W/O DYE: CPT

## 2022-09-27 ENCOUNTER — TELEPHONE (OUTPATIENT)
Dept: FAMILY MEDICINE CLINIC | Age: 57
End: 2022-09-27

## 2022-09-27 DIAGNOSIS — M54.16 CHRONIC LEFT-SIDED LUMBAR RADICULOPATHY: ICD-10-CM

## 2022-09-27 DIAGNOSIS — M54.16 CHRONIC LEFT-SIDED LUMBAR RADICULOPATHY: Primary | ICD-10-CM

## 2022-09-27 RX ORDER — TRAMADOL HYDROCHLORIDE 50 MG/1
50 TABLET ORAL 2 TIMES DAILY PRN
Qty: 45 TABLET | Refills: 0 | Status: SHIPPED | OUTPATIENT
Start: 2022-09-27 | End: 2022-10-12 | Stop reason: SDUPTHER

## 2022-09-27 NOTE — TELEPHONE ENCOUNTER
Submitted PA for traMADol HCl 50MG tablets, Key: [id number]. Medication has been APPROVED. Please notify patient. Thank you.

## 2022-09-27 NOTE — TELEPHONE ENCOUNTER
Refill Request - Controlled Substance    CONFIRM preferrred pharmacy with the patient. If Mail Order Rx - Pend for 90 day refill. Last Seen Department: 8/25/2022    Last Seen by PCP: 8/19/2020    Last Written: 9/12/22 30 tablet 0 refills    Last UDS: n/a    Med Agreement Signed On: n/a    If no future appointment scheduled, route STAFF MESSAGE with patient name to the [facility] for scheduling. CONFIRM preferrred pharmacy with the patient. Next Appointment: n/a  Future Appointments   Date Time Provider Baljinder Navas   9/29/2022  3:30 PM Joseifna Houston, PT [facility] AND [facility]   10/4/2022  4:15 PM Uvaldo Cannon, PT [facility] AND PT [facility]   10/13/2022  4:15 PM Josefina Houston, PT [facility] AND PT [facility]       Message sent to  to schedule appt with patient? YES      Requested Prescriptions     Pending Prescriptions Disp Refills    traMADol (ULTRAM) 50 MG tablet [Pharmacy Med Name: TRAMADOL HCL 50 MG TABLET] 30 tablet 0     Sig: TAKE 1 TABLET BY MOUTH EVERY 6 HOURS AS NEEDED FOR PAIN FOR UP TO 8 DAYS.

## 2022-09-27 NOTE — TELEPHONE ENCOUNTER
FD, please help schedule patient with Dr. Edgardo England for visit to f/u on the Tramadol. January should be fine. And yes, can use the provider fills.

## 2022-09-29 ENCOUNTER — APPOINTMENT (OUTPATIENT)
Dept: PHYSICAL THERAPY | Age: 57
End: 2022-09-29
Payer: COMMERCIAL

## 2022-11-16 ENCOUNTER — OFFICE VISIT (OUTPATIENT)
Dept: FAMILY MEDICINE CLINIC | Age: 57
End: 2022-11-16
Payer: COMMERCIAL

## 2022-11-16 VITALS
BODY MASS INDEX: 38.57 KG/M2 | HEART RATE: 101 BPM | SYSTOLIC BLOOD PRESSURE: 138 MMHG | DIASTOLIC BLOOD PRESSURE: 82 MMHG | WEIGHT: 291 LBS | HEIGHT: 73 IN | OXYGEN SATURATION: 99 %

## 2022-11-16 DIAGNOSIS — M15.9 PRIMARY OSTEOARTHRITIS INVOLVING MULTIPLE JOINTS: ICD-10-CM

## 2022-11-16 DIAGNOSIS — M54.16 LUMBAR RADICULOPATHY: Primary | ICD-10-CM

## 2022-11-16 PROCEDURE — 99396 PREV VISIT EST AGE 40-64: CPT | Performed by: FAMILY MEDICINE

## 2022-11-16 ASSESSMENT — PATIENT HEALTH QUESTIONNAIRE - PHQ9
SUM OF ALL RESPONSES TO PHQ QUESTIONS 1-9: 0
SUM OF ALL RESPONSES TO PHQ9 QUESTIONS 1 & 2: 0
SUM OF ALL RESPONSES TO PHQ QUESTIONS 1-9: 0
2. FEELING DOWN, DEPRESSED OR HOPELESS: 0
1. LITTLE INTEREST OR PLEASURE IN DOING THINGS: 0

## 2022-11-16 ASSESSMENT — ENCOUNTER SYMPTOMS
BACK PAIN: 1
RHINORRHEA: 0
SHORTNESS OF BREATH: 0
ABDOMINAL PAIN: 0
CONSTIPATION: 0
SORE THROAT: 0
COUGH: 0
BLOOD IN STOOL: 0
CHEST TIGHTNESS: 0

## 2022-11-16 NOTE — PROGRESS NOTES
Subjective:      Patient ID: Armando Reese is a 61 y.o. male. HPI  Sent in for annual physical to get a reduction and medical insurance from his employer. Also checkup on some arthritic conditions. In the past 3 or 4 months he has had an epidural injection for his osteoarthritis bulging disc and spinal stenosis which did help somewhat but he said it was very painful and he is not interested in another 1. Review of Systems    Review of Systems   Constitutional:  Negative for unexpected weight change. HENT:  Negative for congestion, postnasal drip, rhinorrhea and sore throat. Eyes:  Negative for visual disturbance. Respiratory:  Negative for cough, chest tightness and shortness of breath. Cardiovascular:  Negative for chest pain, palpitations and leg swelling. Gastrointestinal:  Negative for abdominal pain, blood in stool and constipation. No gerd   Genitourinary:  Negative for dysuria, frequency and hematuria. No nocturia   Musculoskeletal:  Positive for arthralgias and back pain. Negative for myalgias. Skin:  Negative for pallor and rash. Neurological:  Negative for tremors, syncope and headaches. Psychiatric/Behavioral:  Negative for sleep disturbance. The patient is not nervous/anxious. Objective:   Physical Exam      Physical Exam  Constitutional:       General: He is not in acute distress. Appearance: Normal appearance. He is well-developed. He is obese. He is not ill-appearing. HENT:      Head: Normocephalic. Mouth/Throat:      Mouth: Mucous membranes are moist.      Pharynx: Oropharynx is clear. Eyes:      Conjunctiva/sclera: Conjunctivae normal.   Neck:      Thyroid: No thyromegaly. Vascular: No carotid bruit. Cardiovascular:      Rate and Rhythm: Normal rate and regular rhythm. Heart sounds: Normal heart sounds. Pulmonary:      Effort: Pulmonary effort is normal.      Breath sounds: Normal breath sounds.    Abdominal:      General: There is no distension. Palpations: Abdomen is soft. There is no mass. Tenderness: There is no abdominal tenderness. Musculoskeletal:      Cervical back: Neck supple. Right lower leg: No edema. Left lower leg: No edema. Comments: Tender, mild spasm and reduced range of motion of the lumbar spine   Lymphadenopathy:      Cervical: No cervical adenopathy. Skin:     General: Skin is warm and dry. Neurological:      Mental Status: He is alert and oriented to person, place, and time. Psychiatric:         Mood and Affect: Mood normal.         Behavior: Behavior normal.         Thought Content: Thought content normal.         Judgment: Judgment normal.       Assessment:       Diagnosis Orders   1. Lumbar radiculopathy        2. Primary osteoarthritis involving multiple joints              Plan:      Nam was seen today for annual exam.    Diagnoses and all orders for this visit:    Lumbar radiculopathy  Continue medication as needed-if the pain increases probably need to consider another epidural or consultation with orthopedics.   Primary osteoarthritis involving multiple joints  Continue medications as above    See me 1 year     Alonso [address], DO

## 2022-11-17 ENCOUNTER — TELEPHONE (OUTPATIENT)
Dept: FAMILY MEDICINE CLINIC | Age: 57
End: 2022-11-17

## 2022-11-17 NOTE — TELEPHONE ENCOUNTER
Patient's Physical form for [employer] is complete. I just faxed to number provided, wife (on HIPAA) is aware as well.

## 2023-01-11 PROBLEM — M48.061 SPINAL STENOSIS OF LUMBAR REGION: Status: ACTIVE | Noted: 2023-01-11

## 2023-01-11 PROBLEM — M51.26 DISC DISPLACEMENT, LUMBAR: Status: ACTIVE | Noted: 2023-01-11

## 2023-01-11 PROBLEM — M47.816 LUMBAR FACET ARTHROPATHY: Status: ACTIVE | Noted: 2023-01-11

## 2024-06-28 PROBLEM — M54.51 VERTEBROGENIC LOW BACK PAIN: Status: ACTIVE | Noted: 2024-06-28

## 2024-06-28 PROBLEM — M48.062 LUMBAR STENOSIS WITH NEUROGENIC CLAUDICATION: Status: ACTIVE | Noted: 2023-01-11

## 2024-12-27 ENCOUNTER — OFFICE VISIT (OUTPATIENT)
Age: 59
End: 2024-12-27

## 2024-12-27 VITALS
BODY MASS INDEX: 37.11 KG/M2 | HEIGHT: 73 IN | TEMPERATURE: 97.8 F | WEIGHT: 280 LBS | HEART RATE: 77 BPM | OXYGEN SATURATION: 95 % | DIASTOLIC BLOOD PRESSURE: 78 MMHG | SYSTOLIC BLOOD PRESSURE: 124 MMHG

## 2024-12-27 DIAGNOSIS — J40 BRONCHITIS: Primary | ICD-10-CM

## 2024-12-27 DIAGNOSIS — R09.81 NASAL CONGESTION: ICD-10-CM

## 2024-12-27 LAB
Lab: NORMAL
PERFORMING INSTRUMENT: NORMAL
QC PASS/FAIL: NORMAL
SARS-COV-2, POC: NORMAL

## 2024-12-27 RX ORDER — AZITHROMYCIN 250 MG/1
TABLET, FILM COATED ORAL
Qty: 6 TABLET | Refills: 0 | Status: SHIPPED | OUTPATIENT
Start: 2024-12-27

## 2024-12-27 RX ORDER — DEXTROMETHORPHAN HYDROBROMIDE AND PROMETHAZINE HYDROCHLORIDE 15; 6.25 MG/5ML; MG/5ML
5 SYRUP ORAL 4 TIMES DAILY PRN
Qty: 100 ML | Refills: 0 | Status: SHIPPED | OUTPATIENT
Start: 2024-12-27 | End: 2025-01-01

## 2024-12-27 RX ORDER — PREDNISONE 20 MG/1
40 TABLET ORAL DAILY
Qty: 10 TABLET | Refills: 0 | Status: SHIPPED | OUTPATIENT
Start: 2024-12-27 | End: 2025-01-01

## 2024-12-27 NOTE — PROGRESS NOTES
Nam Pulliam (:  1965) is a 59 y.o. male,  here for evaluation of the following chief complaint(s): Congestion, Cough, Generalized Body Aches, and Headache    Nam Pulliam is a: New patient.   Last Urgent Care Visit: Visit date not found  I have reviewed the patient's medications and basic medical history; see Medication Reconciliation.    ASSESSMENT/PLAN:  Diagnosis:     ICD-10-CM    1. Bronchitis  J40 predniSONE (DELTASONE) 20 MG tablet     promethazine-dextromethorphan (PROMETHAZINE-DM) 6.25-15 MG/5ML syrup     azithromycin (ZITHROMAX) 250 MG tablet      2. Nasal congestion  R09.81 POCT COVID-19, Antigen             Medical Decision Making:   Patient was seen at Urgent Care today for productive cough; chest congestion; sinus/nasal congestion; x 5 day(s)   Pt does not have significant respiratory history:      On presentation, pt appears well. They are afrebile, not hypoxic or in any respiratory distress.   Lungs clear on auscultation. Remainder of exam is largely benign without significant concerning findings.    COVID was NEGATIVE;   Patient will be treated for Bronchitis    Prescription(s) provided: Zpak;, Prednisone;, and Promethazine-DM;    Patient was discharged home with follow-up and return precautions.    Patient's initial blood pressure was elevated greater than 130/80. BP was rechecked prior to discharge and is below 130/80. Therefore, referral to PCP is not required at this time.       Results:  Results for orders placed or performed in visit on 24   POCT COVID-19, Antigen   Result Value Ref Range    SARS-COV-2, POC Not-Detected Not Detected    Lot Number [id number]     QC Pass/Fail Pass     Performing Instrument Marcia Starks           SUBJECTIVE/OBJECTIVE:  HPI    This is a 59 y.o. male that presents today with complaint of: chest congestion; sinus/nasal congestion; sinus pain and pressure;   Symptoms have been ongoing x 5 day(s)    Started with symptoms 5 days ago. Now worsening.

## (undated) DEVICE — SYSTEM SKIN CLSR 22CM DERMBND PRINEO

## (undated) DEVICE — DRAPE C ARM W46XL120IN XLN

## (undated) DEVICE — GUIDEWIRE ORTH L150MM DIA1.25MM S STL NTHRD FOR 4MM CANN

## (undated) DEVICE — Device

## (undated) DEVICE — SOLUTION IV 1000ML 0.9% SOD CHL FOR IRRIG PLAS CONT

## (undated) DEVICE — TAPE SUT MULTIFILAMENT POLYOLEFIN XBRAID TT 3910900011

## (undated) DEVICE — DRILL BIT 2.8MM (7/64'') X 128.0MM

## (undated) DEVICE — GAUZE,SPONGE,2"X2",8PLY,STERILE,LF,2'S: Brand: MEDLINE

## (undated) DEVICE — Device: Brand: ACCUPORT® CANNULA

## (undated) DEVICE — BANDAGE WND 3INX5YD CO FLX FOAM

## (undated) DEVICE — TINCTURE BENZOIN SKIN STERI-STRIP VIAL

## (undated) DEVICE — GLOVE SURG SZ 8 L12IN FNGR THK79MIL GRN LTX FREE

## (undated) DEVICE — SUTURE VCRL + SZ 2-0 L18IN ABSRB UD CT1 L36MM 1/2 CIR VCP839D

## (undated) DEVICE — BIT DRL L160MM DIA2.7MM CANN QUIK CPL ADJ STP REUSE FOR

## (undated) DEVICE — 3M™ STERI-DRAPE™ INSTRUMENT POUCH 1018: Brand: STERI-DRAPE™

## (undated) DEVICE — GOWN,SIRUS,NONRNF,3XL,18/CS: Brand: MEDLINE

## (undated) DEVICE — BIT DRILL 3.5MM

## (undated) DEVICE — SET IV PMP 1 PRT CK VLV SPL SEPT PRTS 2 PC M LL 20 GTT LEN

## (undated) DEVICE — MANIFOLD SURG NEPTUNE WST MGMT

## (undated) DEVICE — SHEET,DRAPE,53X77,STERILE: Brand: MEDLINE

## (undated) DEVICE — 3M™ TEGADERM™ TRANSPARENT FILM DRESSING FRAME STYLE WITH BORDER, 1616, 4 IN X 4-3/4 IN (10 CM X 12 CM), 50/CT 4CT/CASE: Brand: 3M™ TEGADERM™

## (undated) DEVICE — TAPE SUT MULTIFILAMENT POLYOLEFIN XBRAID TT 3910900016

## (undated) DEVICE — 40763 BEACH CHAIR HEAD RESTRAINT: Brand: 40763 BEACH CHAIR HEAD RESTRAINT

## (undated) DEVICE — Z INACTIVE NO SUPPLIER SOLUTIONIRRIG 3000ML 0.9% SOD CHL FLX CONT [79720808] [HOSPIRA WORLDWIDE INC]

## (undated) DEVICE — TAPE SUT SM CTX 2.2 MM 40 IN 1/2 CIR TAPER WHT XBRAID TT

## (undated) DEVICE — INTENT TO BE USED WITH SUTURE MATERIAL FOR TISSUE CLOSURE: Brand: RICHARD-ALLAN® NEEDLE 3/8 CIRCLE TROCAR

## (undated) DEVICE — 3M™ STERI-STRIP™ REINFORCED ADHESIVE SKIN CLOSURES, R1547, 1/2 IN X 4 IN (12 MM X 100 MM), 6 STRIPS/ENVELOPE: Brand: 3M™ STERI-STRIP™

## (undated) DEVICE — SUTURE ETHLN SZ 4-0 L18IN NONABSORBABLE BLK L19MM PS-2 3/8 1667H

## (undated) DEVICE — GOLDVAC SLIM PUSH BUTTON SMOKE EVACUATION PENCIL 10 FT (3.0 M): Brand: GOLDVAC

## (undated) DEVICE — GLOVE ORANGE PI 7 1/2   MSG9075

## (undated) DEVICE — SLING ARM XL L20IN D75IN WHT POLY MESH ENVELOP MTL SIDE

## (undated) DEVICE — GLOVE SURG SZ 85 L12IN FNGR THK94MIL STD WHT ISOLEX LTX

## (undated) DEVICE — SHOULDER STABILIZATION KIT,                                    DISPOSABLE 12 PER BOX

## (undated) DEVICE — GAUZE,SPONGE,4"X4",8PLY,STRL,LF,10/TRAY: Brand: MEDLINE

## (undated) DEVICE — ICONIX 2 NEEDLES WITH INTELLIBRAID TECHNOLOGY, 2.3MM ANCHOR WITH 2.0MM XBRAID TT
Type: IMPLANTABLE DEVICE | Site: SHOULDER | Status: NON-FUNCTIONAL
Brand: ICONIX

## (undated) DEVICE — BLADE SHV L13CM DIA4MM EXCALIBUR AGG COOLCUT

## (undated) DEVICE — 3M™ STERI-DRAPE™ U-DRAPE, LONG 1019: Brand: STERI-DRAPE™

## (undated) DEVICE — PACK PROCEDURE SURG SURGERYARTHROSCOPY KNEE